# Patient Record
Sex: MALE | Race: WHITE | NOT HISPANIC OR LATINO | Employment: UNEMPLOYED | URBAN - METROPOLITAN AREA
[De-identification: names, ages, dates, MRNs, and addresses within clinical notes are randomized per-mention and may not be internally consistent; named-entity substitution may affect disease eponyms.]

---

## 2017-06-20 ENCOUNTER — OFFICE VISIT (OUTPATIENT)
Dept: SLEEP MEDICINE | Facility: CLINIC | Age: 22
End: 2017-06-20
Attending: INTERNAL MEDICINE
Payer: COMMERCIAL

## 2017-06-20 VITALS
OXYGEN SATURATION: 99 % | HEIGHT: 63 IN | HEART RATE: 99 BPM | BODY MASS INDEX: 22.02 KG/M2 | WEIGHT: 124.3 LBS | SYSTOLIC BLOOD PRESSURE: 143 MMHG | RESPIRATION RATE: 20 BRPM | DIASTOLIC BLOOD PRESSURE: 92 MMHG

## 2017-06-20 DIAGNOSIS — G47.19 EXCESSIVE DAYTIME SLEEPINESS: Primary | ICD-10-CM

## 2017-06-20 LAB
AMPHETAMINES UR QL SCN: NORMAL
BARBITURATES UR QL: NORMAL
BENZODIAZ UR QL: NORMAL
CANNABINOIDS UR QL SCN: NORMAL
COCAINE UR QL: NORMAL
ETHANOL UR QL SCN: NORMAL
OPIATES UR QL SCN: NORMAL
PCP UR QL SCN: NORMAL

## 2017-06-20 PROCEDURE — 80320 DRUG SCREEN QUANTALCOHOLS: CPT | Performed by: INTERNAL MEDICINE

## 2017-06-20 PROCEDURE — 80307 DRUG TEST PRSMV CHEM ANLYZR: CPT | Performed by: INTERNAL MEDICINE

## 2017-06-20 PROCEDURE — 99211 OFF/OP EST MAY X REQ PHY/QHP: CPT | Mod: ZF

## 2017-06-20 RX ORDER — CETIRIZINE HYDROCHLORIDE 10 MG/1
10 TABLET ORAL DAILY
COMMUNITY

## 2017-06-20 RX ORDER — VENLAFAXINE HYDROCHLORIDE 150 MG/1
300 TABLET, EXTENDED RELEASE ORAL EVERY MORNING
COMMUNITY

## 2017-06-20 NOTE — NURSING NOTE
"Chief Complaint   Patient presents with     Sleep Problem       Initial BP (!) 143/92  Pulse 99  Resp 20  Ht 1.6 m (5' 3\")  Wt 56.4 kg (124 lb 4.8 oz)  SpO2 99%  BMI 22.02 kg/m2 Estimated body mass index is 22.02 kg/(m^2) as calculated from the following:    Height as of this encounter: 1.6 m (5' 3\").    Weight as of this encounter: 56.4 kg (124 lb 4.8 oz).  Medication Reconciliation: complete   Neck 34      [facility] CMA      "

## 2017-06-20 NOTE — MR AVS SNAPSHOT
After Visit Summary   6/20/2017    Namita Maharaj    MRN: [id number]           Patient Information     Date Of Birth          1995        Visit Information        Provider Department      6/20/2017 2:00 PM Alyssa Bryant MD [facility], [place], Sleep Study        Today's Diagnoses     Excessive daytime sleepiness    -  1      Care Instructions      Your BMI is Body mass index is 22.02 kg/(m^2).  Weight management is a personal decision.  If you are interested in exploring weight loss strategies, the following discussion covers the approaches that may be successful. Body mass index (BMI) is one way to tell whether you are at a healthy weight, overweight, or obese. It measures your weight in relation to your height.  A BMI of 18.5 to 24.9 is in the healthy range. A person with a BMI of 25 to 29.9 is considered overweight, and someone with a BMI of 30 or greater is considered obese. More than two-thirds of American adults are considered overweight or obese.  Being overweight or obese increases the risk for further weight gain. Excess weight may lead to heart disease and diabetes.  Creating and following plans for healthy eating and physical activity may help you improve your health.  Weight control is part of healthy lifestyle and includes exercise, emotional health, and healthy eating habits. Careful eating habits lifelong are the mainstay of weight control. Though there are significant health benefits from weight loss, long-term weight loss with diet alone may be very difficult to achieve- studies show long-term success with dietary management in less than 10% of people. Attaining a healthy weight may be especially difficult to achieve in those with severe obesity. In some cases, medications, devices and surgical management might be considered.  What can you do?  If you are overweight or obese and are interested in methods for weight loss, you should discuss this with your provider.      Consider reducing daily calorie intake by 500 calories.     Keep a food journal.     Avoiding skipping meals, consider cutting portions instead.    Diet combined with exercise helps maintain muscle while optimizing fat loss. Strength training is particularly important for building and maintaining muscle mass. Exercise helps reduce stress, increase energy, and improves fitness. Increasing exercise without diet control, however, may not burn enough calories to loose weight.       Start walking three days a week 10-20 minutes at a time    Work towards walking thirty minutes five days a week     Eventually, increase the speed of your walking for 1-2 minutes at time    In addition, we recommend that you review healthy lifestyles and methods for weight loss available through the National Institutes of Health patient information sites:  http://win.niddk.nih.gov/publications/index.htm    And look into health and wellness programs that may be available through your health insurance provider, employer, local community center, or jordan club.                  Follow-ups after your visit        Who to contact     If you have questions or need follow up information about today's clinic visit or your schedule please contact [facility] directly at [phone].  Normal or non-critical lab and imaging results will be communicated to you by [employer], letter or phone within 4 business days after the clinic has received the results. If you do not hear from us within 7 days, please contact the clinic through [employer] or phone. If you have a critical or abnormal lab result, we will notify you by phone as soon as possible.  Submit refill requests through People [employer] or call your pharmacy and they will forward the refill request to us. Please allow 3 business days for your refill to be completed.          Additional Information About Your Visit        [employer] Information     People and Pages lets you send messages to your doctor,  "view your test results, renew your prescriptions, schedule appointments and more. To sign up, go to [email or web address] . Click on \"Log in\" on the left side of the screen, which will take you to the Welcome page. Then click on \"Sign up Now\" on the right side of the page.     You will be asked to enter the access code listed below, as well as some personal information. Please follow the directions to create your username and password.     Your access code is: [email or web address]  Expires: 2017  5:43 PM     Your access code will  in 90 days. If you need help or a new code, please call your [facility] clinic or [phone].        Care EveryWhere ID     This is your Care EveryWhere ID. This could be used by other organizations to access your [facility] medical records  [id number]        Your Vitals Were     Pulse Respirations Height Pulse Oximetry BMI (Body Mass Index)       99 20 1.6 m (5' 3\") 99% 22.02 kg/m2        Blood Pressure from Last 3 Encounters:   17 (!) 143/92    Weight from Last 3 Encounters:   17 56.4 kg (124 lb 4.8 oz) (42 %)*     * Growth percentiles are based on CDC 2-20 Years data.              We Performed the Following     Drug abuse screen 8 urine (UR)        Primary Care Provider    None       No address on file        Thank you!     Thank you for choosing [facility]  for your care. Our goal is always to provide you with excellent care. Hearing back from our patients is one way we can continue to improve our services. Please take a few minutes to complete the written survey that you may receive in the mail after your visit with us. Thank you!             Your Updated Medication List - Protect others around you: Learn how to safely use, store and throw away your medicines at www.disposemymeds.org.          This list is accurate as of: 17  5:43 PM.  Always use your most recent med list.                   Brand Name Dispense Instructions for use    cetirizine " 10 MG tablet    zyrTEC     Take 10 mg by mouth       estradiol cypionate 5 MG/ML injection    DEPO-ESTRADIOL     Inject into the muscle every 28 days       * RITALIN PO      Take 10 mg by mouth 5 times daily       * CONCERTA PO      Take 36 mg by mouth daily       venlafaxine 150 MG Tb24 24 hr tablet    EFFEXOR-ER     Take 150 mg by mouth daily (with breakfast)       * Notice:  This list has 2 medication(s) that are the same as other medications prescribed for you. Read the directions carefully, and ask your doctor or other care provider to review them with you.

## 2017-06-20 NOTE — PATIENT INSTRUCTIONS

## 2017-06-20 NOTE — PROGRESS NOTES
DICTATED THE SLEEP CLINIC VISIT NOTE FOR TODAY.  Alyssa Bryant MD   of Medicine,  Division of Pulmonary/Sleep Medicine  [facility].
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
## 2017-07-12 ENCOUNTER — TEAM CONFERENCE (OUTPATIENT)
Dept: SLEEP MEDICINE | Facility: CLINIC | Age: 22
End: 2017-07-12

## 2017-07-12 NOTE — TELEPHONE ENCOUNTER
Records received from [facility] [place] regarding sleep study and MSLT. Chart notes were also sent.     All records were scanned and handed to provider for review.     WOJCIECH Dennis  Clinic Coordinator   Registered Medical Assistant   [facility]

## 2021-06-30 ENCOUNTER — TELEPHONE (OUTPATIENT)
Dept: SURGERY | Facility: CLINIC | Age: 26
End: 2021-06-30

## 2021-06-30 ENCOUNTER — TELEPHONE (OUTPATIENT)
Dept: PLASTIC SURGERY | Facility: CLINIC | Age: 26
End: 2021-06-30

## 2021-06-30 DIAGNOSIS — F64.0 GENDER DYSPHORIA IN ADOLESCENT AND ADULT: Primary | ICD-10-CM

## 2021-06-30 NOTE — TELEPHONE ENCOUNTER
Writer called pt regarding interest in top surgery. No answer, LVM with [facility] contact information.     Lucina France

## 2021-06-30 NOTE — TELEPHONE ENCOUNTER
[facility] Call Center    Phone Message    May a detailed message be left on voicemail: yes     Reason for Call: Other: Pt called in wanting to speak with care team about top surgery. Please reach back out. Thank you.     Action Taken: Message routed to:  [facility]ose antonio [facility]    Travel Screening: Not Applicable

## 2021-06-30 NOTE — TELEPHONE ENCOUNTER
[facility] :  Care Coordination Note     SITUATION   Pt (Gregg, he/him) is a 25 year old adult  who is receiving support for:  Care Team  .    BACKGROUND     Pt called back regarding interest in top surgery. Scheduled for 3/22/21 with Juanita.     Pt's name is legally changed, will provide court order documentation via [employer].     ASSESSMENT     Surgery              [facility] Assessment  [facility] ([facility]) Enrollment: Enrolled  Patient has a therapist: Yes  Name of therapist: Arlet Cabrera at [facility]  Letter of support #1: Requested  Surgery being considered: Yes  Mastectomy: Yes    Pt reports:    No smoking  No diabetes, but does report hypoglycemia  HRT since August 2019  No previous gender confirming surgeries  Sees Arlet Cabrera at [facility] for therapy      PLAN          Nursing Interventions:  [facility] assessment completed    Follow-up plan:    1. Obtain LOS, send name change documentation       Lucina France

## 2021-08-14 ENCOUNTER — HEALTH MAINTENANCE LETTER (OUTPATIENT)
Age: 26
End: 2021-08-14

## 2021-10-04 ENCOUNTER — HEALTH MAINTENANCE LETTER (OUTPATIENT)
Age: 26
End: 2021-10-04

## 2021-12-23 NOTE — TELEPHONE ENCOUNTER
FUTURE VISIT INFORMATION      FUTURE VISIT INFORMATION:    Date: 3/22/22    Time: 1:00pm    Location: [facility]  REFERRAL INFORMATION:    Referring provider:  self    Referring providers clinic:  n/a    Reason for visit/diagnosis  top consult     RECORDS REQUESTED FROM:       [facility] to collect

## 2022-01-07 ENCOUNTER — MEDICAL CORRESPONDENCE (OUTPATIENT)
Dept: HEALTH INFORMATION MANAGEMENT | Facility: CLINIC | Age: 27
End: 2022-01-07
Payer: COMMERCIAL

## 2022-01-20 ENCOUNTER — MEDICAL CORRESPONDENCE (OUTPATIENT)
Dept: HEALTH INFORMATION MANAGEMENT | Facility: CLINIC | Age: 27
End: 2022-01-20
Payer: COMMERCIAL

## 2022-01-21 ENCOUNTER — TRANSCRIBE ORDERS (OUTPATIENT)
Dept: OTHER | Age: 27
End: 2022-01-21
Payer: COMMERCIAL

## 2022-01-21 DIAGNOSIS — N30.90 CYSTITIS: ICD-10-CM

## 2022-01-21 DIAGNOSIS — Z78.9 FEMALE-TO-MALE TRANSGENDER PERSON: Primary | ICD-10-CM

## 2022-01-25 ENCOUNTER — PRE VISIT (OUTPATIENT)
Dept: UROLOGY | Facility: CLINIC | Age: 27
End: 2022-01-25
Payer: COMMERCIAL

## 2022-01-25 NOTE — TELEPHONE ENCOUNTER
MEDICAL RECORDS REQUEST   [facility]  Urology Clinic  [address] [place] [address]  PHONE: [phone]  Fax: [phone]        FUTURE VISIT INFORMATION                                                   Gregg Maharaj, : 1995 scheduled for future visit at [facility]    APPOINTMENT INFORMATION:    Date: 2022    Provider:  Marnie Stout PA    Reason for Visit/Diagnosis: Female-to-male transgender person [Z78.9]    Cystitis [N30.90]      REFERRAL INFORMATION:    Referring provider: Jessica Cardoso MD     Specialty: N/A    Referring providers clinic:  [facility]     Clinic contact number:  Phone: [phone], Fax: [phone]    RECORDS REQUESTED FOR VISIT                                                     NOTES  STATUS/DETAILS   OFFICE NOTE from referring provider  yes, 2022   OFFICE NOTE from other specialist  no   DISCHARGE SUMMARY from hospital  no   DISCHARGE REPORT from the ER  no   OPERATIVE REPORT  no   MEDICATION LIST  yes   LABS     URINALYSIS (UA)  yes   URINE CYTOLOGY  no   IMAGING (IMAGES & REPORT)  no     PRE-VISIT CHECKLIST      Record collection complete Yes   Appointment appropriately scheduled           (right time/right provider) Yes   Joint diagnostic appointment coordinated correctly          (ensure right order & amount of time) Yes   MyChart activation Yes   Questionnaire complete If no, please explain pending

## 2022-01-26 ENCOUNTER — TRANSCRIBE ORDERS (OUTPATIENT)
Dept: MULTI SPECIALTY CLINIC | Facility: CLINIC | Age: 27
End: 2022-01-26
Payer: COMMERCIAL

## 2022-01-26 DIAGNOSIS — R74.8 ELEVATED CK: ICD-10-CM

## 2022-01-26 DIAGNOSIS — R76.8 POSITIVE ANA (ANTINUCLEAR ANTIBODY): Primary | ICD-10-CM

## 2022-01-26 DIAGNOSIS — G89.4 CHRONIC PAIN SYNDROME: ICD-10-CM

## 2022-01-26 NOTE — CONFIDENTIAL NOTE
Reason for visit: consult cystitis     Relevant information: dysuria, bilateral lower quadrant abdominal and lower back pain, urinary frequency and urgency    Records/imaging/labs/orders: records and labs I [facility]    Pt called: n/a    At Rooming: virtual

## 2022-01-27 ENCOUNTER — TELEPHONE (OUTPATIENT)
Dept: MULTI SPECIALTY CLINIC | Facility: CLINIC | Age: 27
End: 2022-01-27

## 2022-01-27 NOTE — TELEPHONE ENCOUNTER
[facility] Call Center    Phone Message    May a detailed message be left on voicemail: yes     Reason for Call: Appointment Intake    Referring Provider Name: Jessica Cardoso MD    Diagnosis and/or Symptoms: Positive VERNON (antinuclear antibody)  Elevated CK    [place] [facility]          Action Taken: Message routed to:  [facility] ([facility] Rheum    Travel Screening: Not Applicable

## 2022-01-28 ENCOUNTER — VIRTUAL VISIT (OUTPATIENT)
Dept: UROLOGY | Facility: CLINIC | Age: 27
End: 2022-01-28
Attending: INTERNAL MEDICINE
Payer: COMMERCIAL

## 2022-01-28 DIAGNOSIS — R35.0 URINARY FREQUENCY: ICD-10-CM

## 2022-01-28 DIAGNOSIS — Z78.9 FEMALE-TO-MALE TRANSGENDER PERSON: ICD-10-CM

## 2022-01-28 DIAGNOSIS — R10.2 PELVIC PAIN IN FEMALE: Primary | ICD-10-CM

## 2022-01-28 DIAGNOSIS — R39.15 URINARY URGENCY: ICD-10-CM

## 2022-01-28 DIAGNOSIS — N30.90 CYSTITIS: ICD-10-CM

## 2022-01-28 PROBLEM — M79.605 BILATERAL LEG PAIN: Status: ACTIVE | Noted: 2021-09-16

## 2022-01-28 PROBLEM — J45.20 MILD INTERMITTENT ASTHMA WITHOUT COMPLICATION: Status: ACTIVE | Noted: 2018-01-09

## 2022-01-28 PROBLEM — M79.604 BILATERAL LEG PAIN: Status: ACTIVE | Noted: 2021-09-16

## 2022-01-28 PROBLEM — R76.8 ANA POSITIVE: Status: ACTIVE | Noted: 2021-07-09

## 2022-01-28 PROBLEM — R74.8 ELEVATED CK: Status: ACTIVE | Noted: 2021-07-09

## 2022-01-28 PROBLEM — N39.0 ACUTE UTI: Status: ACTIVE | Noted: 2022-01-18

## 2022-01-28 PROBLEM — N94.6 DYSMENORRHEA: Status: ACTIVE | Noted: 2018-01-09

## 2022-01-28 PROBLEM — G47.411 NARCOLEPSY AND CATAPLEXY: Status: ACTIVE | Noted: 2022-01-28

## 2022-01-28 PROBLEM — F41.0 PANIC: Status: ACTIVE | Noted: 2021-12-11

## 2022-01-28 PROCEDURE — 99203 OFFICE O/P NEW LOW 30 MIN: CPT | Mod: GT | Performed by: PHYSICIAN ASSISTANT

## 2022-01-28 RX ORDER — OMEGA-3 FATTY ACIDS/FISH OIL 300-1000MG
CAPSULE ORAL PRN
COMMUNITY

## 2022-01-28 RX ORDER — CEFDINIR 300 MG/1
300 CAPSULE ORAL
COMMUNITY
Start: 2022-01-18 | End: 2022-01-28

## 2022-01-28 RX ORDER — ALBUTEROL SULFATE 90 UG/1
2 AEROSOL, METERED RESPIRATORY (INHALATION) PRN
COMMUNITY
Start: 2021-10-04

## 2022-01-28 RX ORDER — GABAPENTIN 300 MG/1
300-600 CAPSULE ORAL 3 TIMES DAILY PRN
COMMUNITY
Start: 2021-10-16

## 2022-01-28 RX ORDER — SUMATRIPTAN 50 MG/1
50 TABLET, FILM COATED ORAL
COMMUNITY
Start: 2021-07-06 | End: 2024-08-14 | Stop reason: ALTCHOICE

## 2022-01-28 RX ORDER — TESTOSTERONE 20.25 MG/1.25G
GEL TOPICAL
COMMUNITY
Start: 2021-07-06 | End: 2022-09-12

## 2022-01-28 RX ORDER — MULTIVIT-MIN/FOLIC ACID/BIOTIN 400-400MCG
1 CAPSULE ORAL DAILY
COMMUNITY
Start: 2021-07-07 | End: 2024-08-14

## 2022-01-28 RX ORDER — CLINDAMYCIN AND BENZOYL PEROXIDE 10; 50 MG/G; MG/G
GEL TOPICAL PRN
COMMUNITY
Start: 2021-04-27 | End: 2024-08-14 | Stop reason: ALTCHOICE

## 2022-01-28 NOTE — PROGRESS NOTES
Gregg is a 26 year old who is being evaluated via a billable video visit.      How would you like to obtain your AVS? [employer]  If the video visit is dropped, the invitation should be resent by: Text to cell phone: [phone]  Will anyone else be joining your video visit? No      Video Start Time: 1:34 PM  Video-Visit Details    Type of service:  Video Visit    Video End Time:1:52 PM    Originating Location (pt. Location): Home    Distant Location (provider location):  [facility] UROLOGY [facility]     Platform used for Video Visit: Ataxion

## 2022-01-28 NOTE — TELEPHONE ENCOUNTER
Reviewed patient's chart: CK and VERNON results from 7/9/21: VERNON 1:320, dense fine speckled pattern, CK  223. Has been seen at [facility] for exercise intolerance, is currently receiving physical therapy for chronic fatigue and chronic pain. [facility] rheumatologists do not see patient with these diagnosis, patient should not be scheduled at [facility]   Amina Gray RN  Adult Rheumatology Clinic

## 2022-01-28 NOTE — PROGRESS NOTES
Chief Complaint:   Persistent UTI symptoms         History of Present Illness:   Gregg Maharaj is a 26 year old transgender male who presents for evaluation of persistent UTI symptoms.  He reports symptoms started with lower pelvic pain, and cramping with urination.  He also reports urinary frequency and urgency, but denies any incontinence.  No gross hematuria.  He was diagnosed with an urinary tract infection on 1/11/2022 at an outside facility and reports being prescribed ciprofloxacin 500 mg bid for 7 days in order to treat such.  He reports that an urine culture obtained at that visit grew out only mixed mariely.  He reports taking the ciprofloxacin as prescribed but denies finding it helpful thus far.  On 1/16/2022 he developed some flank pain and low grade fever, but this subsequently improved.  He subsequently presented to  on 1/18/2022 at which time he was switched to cefdinir for resumed UTI, but urine culture was ultimately negative for growth again.  The patient reports continued symptoms of mild low back pain, usually with urination, and continued lower pelvic pain and cramping with urination.  He reports his urinary stream is generally strong and normal, though he does report occasional foamy urine.  He also reports some delay in starting his urinary stream and has to relax his pelvic muscles, though denies having to strain to void.  He reports he fully empties his bladder.  Patient reports he had STI screening (GC/CT and HIV) with negative results, and normal pelvic exam on 1/21/2022 at the [facility].      Patient reports a history of UTI and pyelonephritis in 4/2020.      Patient has not had gender reassignment surgery.          Past Medical History:   No past medical history on file.         Past Surgical History:   No past surgical history on file.         Medications     Current Outpatient Medications   Medication     albuterol (PROAIR HFA/PROVENTIL HFA/VENTOLIN HFA) 108 (90 Base)  MCG/ACT inhaler     cefdinir (OMNICEF) 300 MG capsule     cetirizine (ZYRTEC) 10 MG tablet     clindamycin-benzoyl peroxide (BENZACLIN) 1-5 % external gel     estradiol cypionate (DEPO-ESTRADIOL) 5 MG/ML injection     gabapentin (NEURONTIN) 300 MG capsule     ibuprofen (ADVIL/MOTRIN) 200 MG capsule     Methylphenidate HCl (CONCERTA PO)     Methylphenidate HCl (RITALIN PO)     OTHER MEDICAL SUPPLIES     propranolol SR BEADS (INDREAL XL) 120 MG 24 hr capsule     Specialty Vitamins Products (VITAMINS FOR HAIR) CAPS     SUMAtriptan (IMITREX) 50 MG tablet     Testosterone 20.25 MG/1.25GM (1.62%) GEL     venlafaxine (EFFEXOR-ER) 150 MG TB24 24 hr tablet     No current facility-administered medications for this visit.            Family History:   No family history on file.         Social History:     Social History     Socioeconomic History     Marital status: Single     Spouse name: Not on file     Number of children: Not on file     Years of education: Not on file     Highest education level: Not on file   Occupational History     Not on file   Tobacco Use     Smoking status: Never Smoker     Smokeless tobacco: Never Used   Substance and Sexual Activity     Alcohol use: Not on file     Drug use: Not on file     Sexual activity: Not on file   Other Topics Concern     Not on file   Social History Narrative     Not on file     Social Determinants of Health     Financial Resource Strain: Not on file   Food Insecurity: Not on file   Transportation Needs: Not on file   Physical Activity: Not on file   Stress: Not on file   Social Connections: Not on file   Intimate Partner Violence: Not on file   Housing Stability: Not on file            Allergies:   Adhesive tape, Other environmental allergy, and Metoclopramide         Review of Systems:  From intake questionnaire   Negative 14 system review except as noted on HPI, nurse's note.         Physical Exam:   Patient is a 26 year old  adult    General Appearance Adult: Alert, no  acute distress, oriented  Lungs: no respiratory distress, or pursed lip breathing  Heart: No obvious jugular venous distension present  Skin: no suspicious lesions or rashes  Neuro: Alert, oriented, speech and mentation normal  Further examination is deferred due to the nature of our visit.        Labs and Pathology:    I personally reviewed all applicable laboratory data and went over findings with patient  Significant for:    CBC RESULTS:  No results for input(s): WBC, HGB, PLT in the last 18628 hours.     BMP RESULTS:  No results for input(s): NA, POTASSIUM, CHLORIDE, CO2, ANIONGAP, GLC, BUN, CR, GFRESTIMATED, GFRESTBLACK, JASMINE in the last 86249 hours.    UA RESULTS: (1/25/2022)  Contains abnormal data UA W/ SEDIMENT EXAM REFLEXED PER CRITERIA  Specimen:  Urine - Urine specimen (specimen)   Ref Range & Units 3 d ago   COLOR                     Yellow Color Yellow    CLARITY                   Clear Clarity Clear    SPECIFIC GRAVITY,URINE    1.010, 1.015, 1.020, 1.025                 1.020    PH,URINE                  6.0, 7.0, 8.0, 5.5, 6.5, 7.5, 8.5                 6.5    UROBILINOGEN,QUALITATIVE  Normal EU/dl Normal    PROTEIN, URINE Negative mg/dL Negative    GLUCOSE, URINE Negative mg/dL Negative    KETONES,URINE             Negative mg/dL Negative    BILIRUBIN,URINE           Negative                 Negative    OCCULT BLOOD,URINE        Negative                 Negative    NITRITE                   Negative                 Negative    LEUKOCYTE ESTERASE        Negative                 Small Abnormal       URINALYSIS MICROSCOPIC  Specimen:  Urine - Urine specimen (specimen)   Ref Range & Units 3 d ago   RBC 0-2, None Seen /HPF 0-2    WBC 0-2, 3-5, None Seen /HPF 3-5    BACTERIA                  None Seen, Rare, Few Bacteria/HPF Few    EPITHELIAL CELLS          None Seen, Few Epi/HPF Few      URINE CULTURE  Specimen:  Urine - Urine specimen (specimen)  Component 3 d ago   CULTURE  <10,000 CFU/mL multiple  organisms        PSA RESULTS  No results found for: PSA      Imaging:    I personally reviewed all applicable imaging and went over findings with patient.  Significant for:    No results found for this or any previous visit.           Assessment and Plan:     Assessment: 26 year old adult with persistent symptoms of pelvic pain, cramping with urination, urinary frequency and urgency, and intermittent mild lower back pain.  Patient has completed two courses of antibiotic therapy with both ciprofloxacin and cefdinir with minimal improvement in symptoms, and multiple UA's unremarkable with negative urine cultures.  We will plan for ureaplasma/mycoplasma culture to rule out atypical urethritis.  We will also plan for referral to PFPT given long standing symptoms of difficulty relaxing his pelvic muscles and suspect pelvic floor dysfunction may be contributing.  If symptoms persist despite negative infectious work up, could consider CT urogram to evaluate for a lower ureter stone, though my suspicion is low given improvement in flank pain and no signs of hematuria on UA's.      Plan:  - Schedule lab only appointment for ureaplasma/mycoplasma urine culture.   - Referral placed for pelvic floor physical therapy.        DONIS Uribe  Department of Urology

## 2022-01-28 NOTE — PATIENT INSTRUCTIONS
UROLOGY CLINIC VISIT PATIENT INSTRUCTIONS    - Schedule lab only appointment for ureaplasma/mycoplasma urine culture.   - Referral placed for pelvic floor physical therapy.  See below for scheduling number and locations.     If you have any issues, questions or concerns in the meantime, do not hesitate to contact us at [phone] or via Press.     It was a pleasure meeting with you today.  Thank you for allowing me and my team the privilege of caring for you today.  YOU are the reason we are here, and I truly hope we provided you with the excellent service you deserve.  Please let us know if there is anything else we can do for you so that we can be sure you are leaving completely satisfied with your care experience.    Marnie Stout PA-C  Department of Urology    ---------------------------------------------------------------------------  Physical Therapy Referral    Please call [phone] to make an appointment     [facility] - [email or web address]  [facility] - [email or web address]    Locations:    [facility] U-Ortho PT [place]   [place] [facility] [place]   FS[facility] [place] [facility] [place]  [place] [facility]

## 2022-02-02 ENCOUNTER — LAB (OUTPATIENT)
Dept: LAB | Facility: CLINIC | Age: 27
End: 2022-02-02
Payer: COMMERCIAL

## 2022-02-02 DIAGNOSIS — R39.15 URINARY URGENCY: ICD-10-CM

## 2022-02-02 DIAGNOSIS — R35.0 URINARY FREQUENCY: ICD-10-CM

## 2022-02-02 PROCEDURE — 87109 MYCOPLASMA: CPT

## 2022-02-09 LAB — BACTERIA UR CULT: NORMAL

## 2022-02-11 ENCOUNTER — MEDICAL CORRESPONDENCE (OUTPATIENT)
Dept: HEALTH INFORMATION MANAGEMENT | Facility: CLINIC | Age: 27
End: 2022-02-11
Payer: COMMERCIAL

## 2022-02-14 ENCOUNTER — ANCILLARY PROCEDURE (OUTPATIENT)
Dept: CT IMAGING | Facility: CLINIC | Age: 27
End: 2022-02-14
Attending: PHYSICIAN ASSISTANT
Payer: COMMERCIAL

## 2022-02-14 DIAGNOSIS — R35.0 URINARY FREQUENCY: ICD-10-CM

## 2022-02-14 DIAGNOSIS — R10.2 PELVIC PAIN: ICD-10-CM

## 2022-02-14 DIAGNOSIS — R39.15 URINARY URGENCY: ICD-10-CM

## 2022-02-14 LAB
CREAT BLD-MCNC: 0.8 MG/DL (ref 0.5–1.3)
GFR SERPL CREATININE-BSD FRML MDRD: >60 ML/MIN/1.73M2

## 2022-02-14 PROCEDURE — 74178 CT ABD&PLV WO CNTR FLWD CNTR: CPT | Performed by: RADIOLOGY

## 2022-02-14 RX ORDER — IOPAMIDOL 755 MG/ML
76 INJECTION, SOLUTION INTRAVASCULAR ONCE
Status: DISCONTINUED | OUTPATIENT
Start: 2022-02-14 | End: 2022-02-14 | Stop reason: CLARIF

## 2022-02-14 RX ORDER — IOPAMIDOL 755 MG/ML
120 INJECTION, SOLUTION INTRAVASCULAR ONCE
Status: COMPLETED | OUTPATIENT
Start: 2022-02-14 | End: 2022-02-14

## 2022-02-14 RX ADMIN — IOPAMIDOL 120 ML: 755 INJECTION, SOLUTION INTRAVASCULAR at 15:54

## 2022-02-17 ENCOUNTER — TRANSCRIBE ORDERS (OUTPATIENT)
Dept: OTHER | Age: 27
End: 2022-02-17
Payer: COMMERCIAL

## 2022-02-17 DIAGNOSIS — L65.9 ALOPECIA: Primary | ICD-10-CM

## 2022-03-21 ENCOUNTER — TELEPHONE (OUTPATIENT)
Dept: PLASTIC SURGERY | Facility: CLINIC | Age: 27
End: 2022-03-21
Payer: COMMERCIAL

## 2022-03-21 NOTE — TELEPHONE ENCOUNTER
Spoke with patient regarding appointment tomorrow with  at 1:00.    Patient asked if they should bring a LOS.    I stated that would be great to bring it.

## 2022-03-22 ENCOUNTER — PRE VISIT (OUTPATIENT)
Dept: UROLOGY | Facility: CLINIC | Age: 27
End: 2022-03-22

## 2022-03-22 ENCOUNTER — OFFICE VISIT (OUTPATIENT)
Dept: PLASTIC SURGERY | Facility: CLINIC | Age: 27
End: 2022-03-22
Payer: COMMERCIAL

## 2022-03-22 ENCOUNTER — MEDICAL CORRESPONDENCE (OUTPATIENT)
Dept: HEALTH INFORMATION MANAGEMENT | Facility: CLINIC | Age: 27
End: 2022-03-22

## 2022-03-22 ENCOUNTER — TRANSFERRED RECORDS (OUTPATIENT)
Dept: HEALTH INFORMATION MANAGEMENT | Facility: CLINIC | Age: 27
End: 2022-03-22

## 2022-03-22 ENCOUNTER — PRE VISIT (OUTPATIENT)
Dept: SURGERY | Facility: CLINIC | Age: 27
End: 2022-03-22
Payer: COMMERCIAL

## 2022-03-22 VITALS
HEIGHT: 63 IN | HEART RATE: 68 BPM | OXYGEN SATURATION: 95 % | TEMPERATURE: 99.4 F | WEIGHT: 218 LBS | SYSTOLIC BLOOD PRESSURE: 121 MMHG | DIASTOLIC BLOOD PRESSURE: 75 MMHG | BODY MASS INDEX: 38.62 KG/M2

## 2022-03-22 DIAGNOSIS — F64.0 GENDER DYSPHORIA IN ADOLESCENT AND ADULT: Primary | ICD-10-CM

## 2022-03-22 DIAGNOSIS — G47.10 HYPERSOMNIA: ICD-10-CM

## 2022-03-22 DIAGNOSIS — F43.10 PTSD (POST-TRAUMATIC STRESS DISORDER): ICD-10-CM

## 2022-03-22 DIAGNOSIS — F33.9 EPISODE OF RECURRENT MAJOR DEPRESSIVE DISORDER, UNSPECIFIED DEPRESSION EPISODE SEVERITY (H): ICD-10-CM

## 2022-03-22 PROCEDURE — 99204 OFFICE O/P NEW MOD 45 MIN: CPT | Mod: KX | Performed by: SURGERY

## 2022-03-22 ASSESSMENT — PAIN SCALES - GENERAL: PAINLEVEL: MODERATE PAIN (4)

## 2022-03-22 NOTE — PROGRESS NOTES
"PLASTICS NEW TOP   HPI: This is a 26 year old biological female transitioning with a history of gender dysphoria and an autoimmune disorder who presents today with his partner, Yuan for a consultation for top surgery. His pronouns are he/him or they/them and his preferred name is Gregg. He was referred by a previous patient and made his appointment 9 months ago. His therapist is Arlet Cabrera at [facility] and has an LOS. He has been on testosterone for 2.5 years, administered by [facility]. He has been living his chosen gender identity/role since 2017. He used to bind but stopped due to pressure pain. Patient reports having occasional pain and mild lactation from R breast. No breast lumps, skin puckering, bloody nipple drainage, or other breast problems. No history of breast imaging, including mammogram or breast ultrasound.     Medical Hx: Gender dysphoria. No history of diabetes mellitus, or GERD. No bleeding, clotting, healing or scarring problems.  Positive VERNON test.  Possible Lupus? Fibromyalgia? Memory issues. Severe brain fog. Possibly due to autoimmune disorder.  Possible EDS?  Hypersomnia.  Sleep-maintence insomnia   Unexplained paralysis. Originally diagnosed as cataplexy. Emotional and environmental triggers. 4th generation? Treated with stimulants and Effexor x 8 years. Helped by NE uptake inhibitor.   Endometriosis.   Asthma.  Hypoglycemia.   Chronic migraines.   Anxiety.  Depression. Bipolar.  ADHD.   PTSD. Victim of stalking.   Features of psychosis - \"\". Depression related.     Surgical Hx:   Oral surgery. Did OK with sedation.  Reglan: feels claustrophobic   Lidocaine: fast metabolism    Family Hx: No family history of breast or ovarian cancer.  Mother: RA and uterine polyp that was precancerous.  Narcolepsy.  Grandmother and Great-Aunt: EDS.  MGF: Hypertension, MI, and DM.  Maternal family history of endometriosis.     Social Hx: Occupation: Worked until November 2021, now on " "[employer].  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   Relationship status/family: Lives with his partner, Yuan, and their roommate. Delta will help with post op cares. Smoking status: None. Alcohol use: None. Diet: Omnivore. High protein and high carb diet due to fast metabolism. Caffeine: 2-3 cans of Coke QD and occasionally coffee. Exercise: PT once a week. Working on pelvic floor and aquatic PT for hypermobility. Occasionally walks. Sleep: 10 hours at bedtime with medication. Methylfenidate. KACY. Baclofen. Propranolol.    PE: General: Height: 5' 3\" Weight: 218 lbs 0 oz   Chest:   Nice upper chest contour.   Chest hair and mild acne.  Bilateral chest striae.  Slight depression of lower sternum.     Grade 2.5 nipple ptosis.   R breast (600 g)  is slightly larger than the L (400 g).   IMFs situated about 2-3 cms below the pec muscle. Incisions may touch at midline.   R IMF situated about 2 lower than the L.   Moderate lateral thoracic rolls.   No anterior axillary folds.    No lymphadenopathy or masses.   Photos taken with consent.     A&P: 26 year old biological female transitioning to male who is a good candidate for gender affirming top surgery with one of the following: bilateral simple mastectomy vs. breast reduction, +/- possible nipple graft reconstruction. He will most likely need a bilateral simple mastectomy with nipple graft reconstruction depending on intraoperative findings and the patient's desired outcome. The patient is interested in nipple grafts. The patient will need a pre-op mammogram in addition to an H&P from their PCP. They will also need a diagnostic assessment with their LOS. They may also benefit from a PAC visit given their extensive PMH and \"cataplexy\".    He did not meet with our Transgender Coordinator but they will be in contact via [employer] to discuss communications with staff and timeline. He did receive an introductory folder of information and contact numbers/names. Any further " discussion of risks and complications will be reviewed during the pre-op visit.    Patient accepts the risks of this procedure and would like to proceed with surgery. He is able to give informed consent for this medically necessary procedure.   Once we receive his therapist letter of support, DA and mammogram results we will initiate the prior authorization process.     According to [place] Case Law and [facility] standards of care, with an appropriate letter of support from a mental health provider, top surgery/mastectomy is medically necessary for the treatment of gender dysphoria.     Total time = 45 minutes, spent on the date of encounter doing chart review, history and physical, dressing changes, documentation, patient education, and any further activity as noted above.     This note was prepared on behalf of Anita Grant MD by Sanjana Gross, a trained medical scribe, based on my observations and the provider's statements to me.

## 2022-03-22 NOTE — NURSING NOTE
"Chief Complaint   Patient presents with     RANCHO Escobedo, is being seen today for a consult regarding top surgery.       Vitals:    03/22/22 1245   BP: 121/75   BP Location: Left arm   Patient Position: Chair   Cuff Size: Adult Large   Pulse: 68   Temp: 99.4  F (37.4  C)   TempSrc: Oral   SpO2: 95%   Weight: 98.9 kg (218 lb)   Height: 1.6 m (5' 3\")       Body mass index is 38.62 kg/m .      Cassandra Zamora LPN    "

## 2022-03-22 NOTE — CONFIDENTIAL NOTE
Reason for visit: cystoscopy     Relevant information: pelvic pain in female, transgender male    Records/imaging/labs/orders: in epic    Pt called: n/a    At Rooming: collect urine, give cipro

## 2022-03-28 ENCOUNTER — TRANSFERRED RECORDS (OUTPATIENT)
Dept: HEALTH INFORMATION MANAGEMENT | Facility: CLINIC | Age: 27
End: 2022-03-28
Payer: COMMERCIAL

## 2022-03-28 ENCOUNTER — MEDICAL CORRESPONDENCE (OUTPATIENT)
Dept: HEALTH INFORMATION MANAGEMENT | Facility: CLINIC | Age: 27
End: 2022-03-28
Payer: COMMERCIAL

## 2022-03-30 ENCOUNTER — OFFICE VISIT (OUTPATIENT)
Dept: UROLOGY | Facility: CLINIC | Age: 27
End: 2022-03-30
Payer: COMMERCIAL

## 2022-03-30 VITALS
HEIGHT: 65 IN | HEART RATE: 87 BPM | BODY MASS INDEX: 36.32 KG/M2 | SYSTOLIC BLOOD PRESSURE: 121 MMHG | DIASTOLIC BLOOD PRESSURE: 85 MMHG | WEIGHT: 218 LBS

## 2022-03-30 DIAGNOSIS — R10.2 PELVIC PAIN: Primary | ICD-10-CM

## 2022-03-30 PROCEDURE — 52000 CYSTOURETHROSCOPY: CPT | Performed by: UROLOGY

## 2022-03-30 RX ORDER — EPINEPHRINE 0.3 MG/.3ML
INJECTION SUBCUTANEOUS
COMMUNITY
Start: 2022-03-24

## 2022-03-30 RX ORDER — DIPHENHYDRAMINE HCL 25 MG
25 CAPSULE ORAL PRN
COMMUNITY
Start: 2022-03-10 | End: 2024-09-16

## 2022-03-30 RX ORDER — CIPROFLOXACIN 500 MG/1
500 TABLET, FILM COATED ORAL ONCE
Status: ACTIVE | OUTPATIENT
Start: 2022-03-30

## 2022-03-30 ASSESSMENT — PAIN SCALES - GENERAL: PAINLEVEL: MODERATE PAIN (5)

## 2022-03-30 NOTE — LETTER
"3/30/2022       RE: Gregg Maharaj  [address]  [address]  [place] [address]     Dear Colleague,    Thank you for referring your patient, Gregg Maharaj, to the [facility] UROLOGY CLINIC [place] at [facility]. Please see a copy of my visit note below.    Urology Clinic     HPI  Gregg Maharaj is a 26 year old transgender adult with history of urinary urgency and abdominal pain, here for cystoscopy.      He was last seen by Marnie Stout_PAC: \"26 year old adult with persistent symptoms of pelvic pain, cramping with urination, urinary frequency and urgency, and intermittent mild lower back pain.  Patient has completed two courses of antibiotic therapy with both ciprofloxacin and cefdinir with minimal improvement in symptoms, and multiple UA's unremarkable with negative urine cultures.\"    CT urogram was obtained which was essentially unremarkable. He is here for cystoscopy     No changes to health, hospitalizations or new diagnoses in the interim    PHYSICAL EXAM  /85   Pulse 87   Ht 1.651 m (5' 5\")   Wt 98.9 kg (218 lb)   BMI 36.28 kg/m     Constitutional: AO, pleasant, NAD  Resp: Non-labored breathing on room air  Abd: Soft, NT, ND  NESS: Deferred     Labs    Last Comprehensive Metabolic Panel:  Creatinine POCT   Date Value Ref Range Status   02/14/2022 0.8 0.5 - 1.3 mg/dL Final     Comment:     20 y and older Female0.52-1.04 mg/dL  20 y and older Male0.66-1.25 mg/dL    Varies with the amount of muscle mass present.    GICH  Female: 0.6-1.2 mg/dL  Male: 0.7-1.3 mg/dL         GFR, ESTIMATED POCT   Date Value Ref Range Status   02/14/2022 >60 >60 mL/min/1.73m2 Final     Comment:     GFR not calculated when sex unspecified or nonbinary.     Imaging   EXAMINATION: CT UROGRAM WO & W CONTRAST, 2/14/2022 4:13 PM     TECHNIQUE:  Helical CT images from the lung bases through the  symphysis pubis were obtained  without and with contrast using " CT  urogram protocol. Contrast dose: Isovue 370  120cc     COMPARISON: None.     HISTORY: Pelvic pain; Urinary frequency; Urinary urgency     FINDINGS:     Urinary tract: No focal mass, or hydroureteronephrosis or stone.     Remainder of the abdomen and pelvis: Liver, spleen, pancreas, and  bilateral adrenal glands are unremarkable. Bladder is unremarkable.     Lung bases:  Clear.     Bones and soft tissues: No suspicious lesion.                                                                      IMPRESSION: No focal mass, or hydroureteronephrosis or stone. Bladder  is unremarkable.      I have personally reviewed the examination and initial interpretation  and I agree with the findings.     JEVON BARBA MD       Cystoscopy procedure     After sterile preparation and draping of the patient,  a 16-Uruguayan flexible cystoscope was introduced via the urethra.  It was passed without difficulty into the bladder.  The urethra was open without evidence of stricture.  The ureteral orifices were orthotopic.  The bladder mucosa was evaluated using both antegrade and retroflex views and this showed no evidence of any lesions or trabeculation. Scope was then removed and patient tolerated the procedure well.     10 total minutes was spent on cystoscopy procedure alone.          ASSESSMENT AND PLAN  26 year old adult with pelvic pain/urgency with unremarkable CT urogram and cystoscopy       Plan   Refer to Dr. Bains or Jessi to assess for cystitis     40 total minutes spent on the date of the encounter including direct interaction with the patient, performing chart review, documentation and further activities as noted above, exclusive of the time spent on performing cystoscopy       Arden Mercedes MD   Department of Urology   [facility]

## 2022-03-30 NOTE — NURSING NOTE
"Chief Complaint   Patient presents with     Cystoscopy       Blood pressure 121/85, pulse 87, height 1.651 m (5' 5\"), weight 98.9 kg (218 lb). Body mass index is 36.28 kg/m .    Patient Active Problem List   Diagnosis     Acute UTI     VERNON positive     Bilateral leg pain     Dysmenorrhea     Elevated CK     Mild intermittent asthma without complication     Narcolepsy and cataplexy     Panic       Allergies   Allergen Reactions     Capsicum Annuum Extract & Derivative (Bell Pepper) [Capsicum] Anaphylaxis     Adhesive Tape      Other reaction(s): Atopic Dermatitis     Other Environmental Allergy Hives     Cotton seed oil     Metoclopramide Anxiety       Current Outpatient Medications   Medication Sig Dispense Refill     diphenhydrAMINE (BENADRYL) 25 MG capsule Take 25 mg by mouth       EPINEPHrine (ANY BX GENERIC EQUIV) 0.3 MG/0.3ML injection 2-pack Inject into the lateral thigh as needed for life threatening allergic reactions.       albuterol (PROAIR HFA/PROVENTIL HFA/VENTOLIN HFA) 108 (90 Base) MCG/ACT inhaler Inhale 2 puffs into the lungs       cetirizine (ZYRTEC) 10 MG tablet Take 10 mg by mouth       clindamycin-benzoyl peroxide (BENZACLIN) 1-5 % external gel APPLY TO AFFECTED AREA TWICE A DAY IN THE MORNING AND IN THE EVENING       estradiol cypionate (DEPO-ESTRADIOL) 5 MG/ML injection Inject into the muscle every 28 days       gabapentin (NEURONTIN) 300 MG capsule Take 300-600 mg by mouth       ibuprofen (ADVIL/MOTRIN) 200 MG capsule        Methylphenidate HCl (CONCERTA PO) Take 36 mg by mouth daily       Methylphenidate HCl (RITALIN PO) Take 10 mg by mouth 5 times daily       OTHER MEDICAL SUPPLIES        propranolol SR BEADS (INDREAL XL) 120 MG 24 hr capsule Take 120 mg by mouth       Specialty Vitamins Products (VITAMINS FOR HAIR) CAPS Take 1 tablet by mouth daily       SUMAtriptan (IMITREX) 50 MG tablet Take 50 mg by mouth       Testosterone 20.25 MG/1.25GM (1.62%) GEL        venlafaxine (EFFEXOR-ER) 150 " MG TB24 24 hr tablet Take 150 mg by mouth daily (with breakfast)         Social History     Tobacco Use     Smoking status: Never Smoker     Smokeless tobacco: Never Used   Substance Use Topics     Alcohol use: None     Drug use: None       Invasive Procedure Safety Checklist:    Procedure: Cystoscopy    Action: Complete sections and checkboxes as appropriate.    Pre-procedure:  1. Patient ID Verified with 2 identifiers ([place] and  or MRN) : YES    2. Procedure and site verified with patient/designee (when able) : YES    3. Accurate consent documentation in medical record : YES    4. H&P (or appropriate assessment) documented in medical record : N/A  H&P must be up to 30 days prior to procedure an updated within 24 hours of                 Procedure as applicable.     5. Relevant diagnostic and radiology test results appropriately labeled and displayed as applicable : YES    6. Blood products, implants, devices, and/or special equipment available for the procedure as applicable : YES    7. Procedure site(s) marked with provider initials [Exclusions: none] : NO    8. Marking not required. Reason : Yes  Procedure does not require site marking    Time Out:     Time-Out performed immediately prior to starting procedure, including verbal and active participation of all team members addressing: YES    1. Correct patient identity.  2. Confirmed that the correct side and site are marked.  3. An accurate procedure to be done.  4. Agreement on the procedure to be done.  5. Correct patient position.  6. Relevant images and results are properly labeled and appropriately displayed.  7. The need to administer antibiotics or fluids for irrigation purposes during the procedure as applicable.  8. Safety precautions based on patient history or medication use.    During Procedure: Verification of correct person, site, and procedure occurs any time the responsibility for care of the patient is transferred to another member of the  care team.    The following medication was given:     MEDICATION: Ciprofloxacin  ROUTE: PO  SITE: medication was given orally   DOSE: 500 mg  LOT #: [id number]  : [employer]  EXPIRATION DATE: 10/2023  NDC#: 1487-0158-47   Was there drug waste? No    Prior to med admin, verified patient identity using patient's name and date of birth.  Due to med administration, patient instructed to remain in clinic for 15 minutes  afterwards, and to report any adverse reaction to me immediately.    Drug Amount Wasted:  None.  Vial/Syringe: Syringe      Grecia Sawant  3/30/2022  2:37 PM

## 2022-03-30 NOTE — PROGRESS NOTES
"Urology Clinic     HPI  Gregg Maharaj is a 26 year old transgender adult with history of urinary urgency and abdominal pain, here for cystoscopy.      He was last seen by Marnie Stout_PAC: \"26 year old adult with persistent symptoms of pelvic pain, cramping with urination, urinary frequency and urgency, and intermittent mild lower back pain.  Patient has completed two courses of antibiotic therapy with both ciprofloxacin and cefdinir with minimal improvement in symptoms, and multiple UA's unremarkable with negative urine cultures.\"    CT urogram was obtained which was essentially unremarkable. He is here for cystoscopy     No changes to health, hospitalizations or new diagnoses in the interim    PHYSICAL EXAM  /85   Pulse 87   Ht 1.651 m (5' 5\")   Wt 98.9 kg (218 lb)   BMI 36.28 kg/m     Constitutional: AO, pleasant, NAD  Resp: Non-labored breathing on room air  Abd: Soft, NT, ND  NESS: Deferred     Labs    Last Comprehensive Metabolic Panel:  Creatinine POCT   Date Value Ref Range Status   02/14/2022 0.8 0.5 - 1.3 mg/dL Final     Comment:     20 y and older Female0.52-1.04 mg/dL  20 y and older Male0.66-1.25 mg/dL    Varies with the amount of muscle mass present.    GICH  Female: 0.6-1.2 mg/dL  Male: 0.7-1.3 mg/dL         GFR, ESTIMATED POCT   Date Value Ref Range Status   02/14/2022 >60 >60 mL/min/1.73m2 Final     Comment:     GFR not calculated when sex unspecified or nonbinary.     Imaging   EXAMINATION: CT UROGRAM WO & W CONTRAST, 2/14/2022 4:13 PM     TECHNIQUE:  Helical CT images from the lung bases through the  symphysis pubis were obtained  without and with contrast using CT  urogram protocol. Contrast dose: Isovue 370  120cc     COMPARISON: None.     HISTORY: Pelvic pain; Urinary frequency; Urinary urgency     FINDINGS:     Urinary tract: No focal mass, or hydroureteronephrosis or stone.     Remainder of the abdomen and pelvis: Liver, spleen, pancreas, and  bilateral adrenal glands are " unremarkable. Bladder is unremarkable.     Lung bases:  Clear.     Bones and soft tissues: No suspicious lesion.                                                                      IMPRESSION: No focal mass, or hydroureteronephrosis or stone. Bladder  is unremarkable.      I have personally reviewed the examination and initial interpretation  and I agree with the findings.     JEVON BARBA MD       Cystoscopy procedure     After sterile preparation and draping of the patient,  a 16-Slovak flexible cystoscope was introduced via the urethra.  It was passed without difficulty into the bladder.  The urethra was open without evidence of stricture.  The ureteral orifices were orthotopic.  The bladder mucosa was evaluated using both antegrade and retroflex views and this showed no evidence of any lesions or trabeculation. Scope was then removed and patient tolerated the procedure well.     10 total minutes was spent on cystoscopy procedure alone.          ASSESSMENT AND PLAN  26 year old adult with pelvic pain/urgency with unremarkable CT urogram and cystoscopy       Plan   Refer to Dr. Bains or Jessi to assess for cystitis     40 total minutes spent on the date of the encounter including direct interaction with the patient, performing chart review, documentation and further activities as noted above, exclusive of the time spent on performing cystoscopy       Arden Mercedes MD   Department of Urology   [facility]

## 2022-03-30 NOTE — PATIENT INSTRUCTIONS
Please follow up with Dr. Bains or Dr. Bowen for cystitis management.    It was a pleasure meeting with you today.  Thank you for allowing me and my team the privilege of caring for you today.  YOU are the reason we are here, and I truly hope we provided you with the excellent service you deserve.  Please let us know if there is anything else we can do for you so that we can be sure you are leaving completely satisfied with your care experience.

## 2022-04-01 ENCOUNTER — MYC MEDICAL ADVICE (OUTPATIENT)
Dept: PLASTIC SURGERY | Facility: CLINIC | Age: 27
End: 2022-04-01

## 2022-04-01 ENCOUNTER — THERAPY VISIT (OUTPATIENT)
Dept: PHYSICAL THERAPY | Facility: CLINIC | Age: 27
End: 2022-04-01
Payer: COMMERCIAL

## 2022-04-01 DIAGNOSIS — R39.15 URINARY URGENCY: ICD-10-CM

## 2022-04-01 DIAGNOSIS — M99.05 SOMATIC DYSFUNCTION OF PELVIS REGION: ICD-10-CM

## 2022-04-01 PROCEDURE — 97162 PT EVAL MOD COMPLEX 30 MIN: CPT | Mod: GP | Performed by: PHYSICAL THERAPIST

## 2022-04-01 PROCEDURE — 97112 NEUROMUSCULAR REEDUCATION: CPT | Mod: GP | Performed by: PHYSICAL THERAPIST

## 2022-04-01 PROCEDURE — 97530 THERAPEUTIC ACTIVITIES: CPT | Mod: GP | Performed by: PHYSICAL THERAPIST

## 2022-04-01 NOTE — PROGRESS NOTES
SUBJECTIVE:  Patient reports onset of symptoms last winter, saw MD recently on 3/30/2022..Symptoms include painful urination, fevers, joint pain, urinary hesitancy.  Since onset symptoms have been getting better, worse or staying the same? worse    Urination:  Do you leak on the way to the bathroom or with a strong urge to void? No    Do you leak with cough,sneeze, jumping, running?No   Any other activities that cause leaking? No   Do you have triggers that make you feel you can't wait to go to the bathroom? Not asked   Type of pad and number used per day? non  When you leak what is the amount? none    How long can you delay the need to urinate? Not at all.   How many times do you get up to urinate at night? 2-3    Can you stop the flow of urine when on the toilet? Yes but difficult  Is the volume of urine passed usually: large if hydrated. (8sec rule=  250ml with average bladder storing  400-600ml)    Do you strain to pass urine? No  Do you have a slow or hesitant urinary stream? Yes  Do you have difficulty initiating the urine stream? Yes  Is urination painful?  Yes    How many bladder infections have you had in last 12 months?reports several    Fluid intake(one glass is 8oz or one cup) several liters glasses/day, 2-3 cans of cokecaffinated glasses/day  non alcohol glasses/day.    Bowel habits:  Frequency of bowel movements? Usually daily times a week  Consistancy of stool? soft formed,   Do you ignore the urge to defecate? No  Do you strain to pass stool? No    Pelvic Pain:  Do you have any pelvic pain with intercourse, exams, use of tampons? Not asked  Is initial penetration during intercourse painful? Sometimes, lubrication helps  Is deeper penetration painful? At times  Do you use lubricant?   Yes What kind? Water based with some silicone      Given birth? No Any complications? {Are you sexually active?Yes  Have you ever been worried for your physical safety? No  Any abdominal or pelvic surgeries? none  Are  you having any regular exercise?currently using a cane due to pain in the knees with walking  Have you practiced the PF(kegel) exercises for 4 or more weeks?no  Thyroid checked?no(related to hair loss, flu-like symptoms, wt gain/loss, fatigue, menopause)  Changed diet lately?no    OBSERVATION  Lumbar Posture: Positive  Pelvic symmetry: Not Tested: Not indicated   Introitus: tight  Trendelenberg Sign:Not Tested: Contraindicated    ROM  Single leg standing unilateral hip flexion PSIS:Not Tested: Contraindicated  Standing forward flexion PSIS:Not Tested: Contraindicated  Passive Hip ROM:Positive  ESPERANZA:Positive  ESPERANZA with OP:Positive  Posterior Sacral Shear:Not Tested: Not indicated     MUSCEL PERFORMANCE  Active SLR:Not Tested: Contraindicated  Abdominal Core 90/90 hip lower:Not Tested: Contraindicated  Baseline PF tone:hyper  PF Tone with cough: not tested  Valsalva: hypo  PF Response quality: slow return to baseline  PF Power: Center: 1 Endurance: Maximum contraction in seconds: 1  # of endurance contractions before fatigue: na  Quick contraction repetitions prior to fatigue: na.  Specificity/accessory muscles: Not Tested, Synergy with abdominals: poor.  Prolapse: Cyctocele/Rectocele/Uterine stgstrstastdstest:st st1st Urethrocele: none, Enterocele: none.    PALPATION: Reproduction of symptoms with digital evaluations      Physical Therapy Initial Evaluation  Subjective:  The history is provided by the patient. No  was used.   Therapist Generated HPI Evaluation  Problem details: Saw MD for pelvic health issues on 3/30/2022.    Got sick in November with an autoimmune disorder, possibly Lupus. Has fevers, rashes, joint pain, and cystitis. Has endometriosis.  On Depovera.  Was diagnosed with this due to family history.  Was diagnosed with interstitial cystitis.  Going to [facility] right now.  Has joint hypermobility.  Has issues with partial dislocations.  Drinking water helps sometimes.  Still gets  abdominal pain. Taking gabapentin for pain.  Had to resign from working at [employer]   Takes testosterone.  Plans to have top surgery.  Heat helps.  .                                                        Objective:  System                                 Pelvic Dysfunction Evaluation:    Bladder/Pelvic Problems:    Storage Problem:  Frequency and urgency  Emptying Problem:  Hesitancy      Diagnostic Tests:              Cystoscopy:  Interstitial cystitis            Flexibility:  NA (pain in the hips)      Abdominal Wall:  Abdominal wall pelvic: difficulty with abdominal contraction.        Pelvic Clock Exam:      Bulbocavernosis pain:  ++    Levator ANI:  +    SI Provocation:  NA        Reflex Testing:  NA    External Assessment:  normal              Internal Assessment:  NA              SEMG Biofeedback:    Equipment:  MrOmniture with computer    Suraface electrode placement--Perianal:  X  Baseline EMG PM:  2-3uV      Sustained contraction:  1-2 sec, spikes of activity, slow to relax    Additional History:    Number of Pregnancies: 0                         General     ROS    Assessment/Plan:    Patient is a 26 year old adult with bladder pain/pelvic dysfunction complaints.    Patient has the following significant findings with corresponding treatment plan.                Diagnosis 1:  Bladder pain/pelvic dysfunction  Pain -  manual therapy, self management, education and home program  Decreased ROM/flexibility - manual therapy, therapeutic exercise and home program  Decreased strength - therapeutic exercise, therapeutic activities and home program  Inflammation - self management/home program  Impaired muscle performance - biofeedback, neuro re-education and home program  Decreased function - therapeutic activities and home program    Therapy Evaluation Codes:   1) History comprised of:   Personal factors that impact the plan of care:      Time since onset of symptoms.    Comorbidity factors that impact the plan of care  are:      Overweight and autoimmune disease.     Medications impacting care: Anti-depressant, Pain and Sleep.  2) Examination of Body Systems comprised of:   Body structures and functions that impact the plan of care:      Pelvis.   Activity limitations that impact the plan of care are:      Frequency and Urgency.  3) Clinical presentation characteristics are:   Evolving/Changing.  4) Decision-Making    Moderate complexity using standardized patient assessment instrument and/or measureable assessment of functional outcome.  Cumulative Therapy Evaluation is: Moderate complexity.    Previous and current functional limitations:  (See Goal Flow Sheet for this information)    Short term and Long term goals: (See Goal Flow Sheet for this information)     Communication ability:  Patient appears to be able to clearly communicate and understand verbal and written communication and follow directions correctly.  Treatment Explanation - The following has been discussed with the patient:   RX ordered/plan of care  Anticipated outcomes  Possible risks and side effects  This patient would benefit from PT intervention to resume normal activities.   Rehab potential is good.    Frequency:  1 X a month, once daily  Duration:  for 4 months  Discharge Plan:  Independent in home treatment program.  Reach maximal therapeutic benefit.    Please refer to the daily flowsheet for treatment today, total treatment time and time spent performing 1:1 timed codes.

## 2022-04-01 NOTE — PROGRESS NOTES
[facility]    OUTPATIENT Physical Therapy ORTHOPEDIC EVALUATION  PLAN OF TREATMENT FOR OUTPATIENT REHABILITATION  (COMPLETE FOR INITIAL CLAIMS ONLY)  Patient's Last Name, First Name, M.I.  YOB: 1995  Gregg Maharaj    Provider s Name:  [facility]   Medical Record No.  [id number]   Start of Care Date:  04/01/22   Onset Date:    (3/30/2022, date patient saw MD)   Type:     _X__PT   ___OT Medical Diagnosis:    Encounter Diagnoses   Name Primary?     Urinary urgency      Somatic dysfunction of pelvis region         Treatment Diagnosis:  interstitial cystitis        Goals:     04/01/22 0700   Urination Dysfunction   Previous Functional Level Normal   Current Functional Level Pain level with voiding;Hesitation with voiding   Performance Level pain 6/10 at rest/ bladder   STG Target Performance Decrease pain level with voiding   Performance Level to 3/10   Rationale painfree urination;Work toward normal voiding patterns to focus on ADLS, work, or school   Due Date 05/01/22   LTG Target Performance Decrease hesitation with voiding   Performance Level no hesitation or pain   Rationale painfree urination;Work toward normal voiding patterns to focus on ADLS, work, or school   Due Date 08/01/22       Therapy Frequency:  once a month  Predicted Duration of Therapy Intervention:  4 months    Tracey Medina, PT                 I CERTIFY THE NEED FOR THESE SERVICES FURNISHED UNDER        THIS PLAN OF TREATMENT AND WHILE UNDER MY CARE     (Physician attestation of this document indicates review and certification of the therapy plan).                       Certification Date From:  04/01/22   Certification Date To:  06/29/22    Referring Provider:  Marnie Stout    Initial Assessment        See Epic Evaluation SOC Date: 04/01/22

## 2022-04-04 NOTE — PROGRESS NOTES
Physical Therapy Initial Evaluation  Subjective:    Patient Health History           General health as reported by patient is poor.  Pertinent medical history includes: seizures, mental illness, depression, fibromyalgia, asthma, migraines/headaches, sleep disorder/apnea and concussions/dizziness. Other medical history details: dx of intestitial cystitis.   Red flags:  Unexplained weight loss, changes in bowel and bladder habits, pain at rest/night, chest pain, persistent fever/chills and significant weakness.  Medical allergies: adhesives. Other medical allergies details: reglan,chili peppers.       Current medications:  Hormone replacement therapy, sleep medication, pain medication, muscle relaxants, anti-depressants and other. Other medications details: stimulants for adhd and wakefulness.    Current occupation is none.   Primary job tasks include:  Repetitive tasks.   Other job/home tasks details: [employer], [employer], draw, working on [employer].                                  Objective:  System    Physical Exam    General     ROS    Assessment/Plan:

## 2022-04-07 ENCOUNTER — TELEPHONE (OUTPATIENT)
Dept: PLASTIC SURGERY | Facility: CLINIC | Age: 27
End: 2022-04-07
Payer: COMMERCIAL

## 2022-04-12 ENCOUNTER — TELEPHONE (OUTPATIENT)
Dept: PLASTIC SURGERY | Facility: CLINIC | Age: 27
End: 2022-04-12
Payer: COMMERCIAL

## 2022-04-12 NOTE — TELEPHONE ENCOUNTER
[facility] :  Care Coordination Note     SITUATION   Gregg Maharaj is a 26 year old adult who is receiving support for:  No chief complaint on file.  .    BACKGROUND     LOS & DA reviewed. They meet requirements    ASSESSMENT     Surgery              CGC Assessment  [facility] [employer] [facility] ([facility]) Enrollment: Enrolled  Patient has a therapist: Yes  Letter of support #1: Received  Letter #1 Date: 04/01/22  Letter of support #2: Received  Letter #2 Date: 04/01/22  Surgery being considered: Yes                          PLAN          Nursing Interventions:      Follow-up plan:  LOS & DA are all set. Patient reported he'll get mammogram today. RNCCs to review results and request orders.        RUKHSANA HENDERSON PA-C

## 2022-04-14 ENCOUNTER — PRE VISIT (OUTPATIENT)
Dept: UROLOGY | Facility: CLINIC | Age: 27
End: 2022-04-14
Payer: COMMERCIAL

## 2022-04-14 NOTE — TELEPHONE ENCOUNTER
Reason for visit: Assess pelvic pain and cystitis     Relevant information: pt on testosterone    Records/imaging/labs/orders: all records available    Pt called: no need for a call      Julia Leigh CMA  4/14/2022  7:11 AM

## 2022-04-27 DIAGNOSIS — F64.0 GENDER DYSPHORIA IN ADOLESCENT AND ADULT: Primary | ICD-10-CM

## 2022-04-27 RX ORDER — CEFAZOLIN SODIUM 2 G/50ML
2 SOLUTION INTRAVENOUS
Status: CANCELLED | OUTPATIENT
Start: 2022-04-27

## 2022-04-27 RX ORDER — CEFAZOLIN SODIUM 2 G/50ML
2 SOLUTION INTRAVENOUS SEE ADMIN INSTRUCTIONS
Status: CANCELLED | OUTPATIENT
Start: 2022-04-27

## 2022-04-28 ENCOUNTER — VIRTUAL VISIT (OUTPATIENT)
Dept: UROLOGY | Facility: CLINIC | Age: 27
End: 2022-04-28
Payer: COMMERCIAL

## 2022-04-28 DIAGNOSIS — M99.05 SOMATIC DYSFUNCTION OF PELVIS REGION: Primary | ICD-10-CM

## 2022-04-28 DIAGNOSIS — N32.81 URGENCY-FREQUENCY SYNDROME: ICD-10-CM

## 2022-04-28 DIAGNOSIS — R10.2 PELVIC PAIN: ICD-10-CM

## 2022-04-28 PROCEDURE — 99214 OFFICE O/P EST MOD 30 MIN: CPT | Mod: 95 | Performed by: UROLOGY

## 2022-04-28 RX ORDER — FESOTERODINE FUMARATE 4 MG/1
4 TABLET, FILM COATED, EXTENDED RELEASE ORAL DAILY
Qty: 30 TABLET | Refills: 3 | Status: SHIPPED | OUTPATIENT
Start: 2022-04-28 | End: 2022-09-12

## 2022-04-28 RX ORDER — LORATADINE 10 MG/1
10 TABLET ORAL EVERY MORNING
COMMUNITY
Start: 2022-03-10

## 2022-04-28 RX ORDER — MONTELUKAST SODIUM 10 MG/1
10 TABLET ORAL AT BEDTIME
COMMUNITY
Start: 2022-03-24

## 2022-04-28 RX ORDER — DIAZEPAM 5 MG
TABLET ORAL
Qty: 40 TABLET | Refills: 3 | Status: SHIPPED | OUTPATIENT
Start: 2022-04-28 | End: 2024-08-14

## 2022-04-28 ASSESSMENT — PAIN SCALES - GENERAL: PAINLEVEL: MODERATE PAIN (4)

## 2022-04-28 NOTE — NURSING NOTE
Chief Complaint   Patient presents with     RECHECK     Discuss cystitis management options       There were no vitals taken for this visit. There is no height or weight on file to calculate BMI.    Patient Active Problem List   Diagnosis     Acute UTI     VERNON positive     Bilateral leg pain     Dysmenorrhea     Elevated CK     Mild intermittent asthma without complication     Narcolepsy and cataplexy     Panic     Urinary urgency     Somatic dysfunction of pelvis region       Allergies   Allergen Reactions     Capsicum Annuum Extract & Derivative (Bell Pepper) [Capsicum] Anaphylaxis     Adhesive Tape      Other reaction(s): Atopic Dermatitis     Other Environmental Allergy Hives     Cotton seed oil     Metoclopramide Anxiety       Current Outpatient Medications   Medication Sig Dispense Refill     loratadine (CLARITIN) 10 MG tablet Take 10 mg by mouth       montelukast (SINGULAIR) 10 MG tablet Take 10 mg by mouth daily       albuterol (PROAIR HFA/PROVENTIL HFA/VENTOLIN HFA) 108 (90 Base) MCG/ACT inhaler Inhale 2 puffs into the lungs       cetirizine (ZYRTEC) 10 MG tablet Take 10 mg by mouth       clindamycin-benzoyl peroxide (BENZACLIN) 1-5 % external gel APPLY TO AFFECTED AREA TWICE A DAY IN THE MORNING AND IN THE EVENING (Patient not taking: Reported on 4/28/2022)       diphenhydrAMINE (BENADRYL) 25 MG capsule Take 25 mg by mouth       EPINEPHrine (ANY BX GENERIC EQUIV) 0.3 MG/0.3ML injection 2-pack Inject into the lateral thigh as needed for life threatening allergic reactions.       estradiol cypionate (DEPO-ESTRADIOL) 5 MG/ML injection Inject into the muscle every 28 days       gabapentin (NEURONTIN) 300 MG capsule Take 300-600 mg by mouth       ibuprofen (ADVIL/MOTRIN) 200 MG capsule        Methylphenidate HCl (CONCERTA PO) Take 36 mg by mouth daily       Methylphenidate HCl (RITALIN PO) Take 10 mg by mouth 5 times daily       OTHER MEDICAL SUPPLIES        propranolol SR BEADS (INDREAL XL) 120 MG 24 hr  capsule Take 120 mg by mouth       Specialty Vitamins Products (VITAMINS FOR HAIR) CAPS Take 1 tablet by mouth daily       SUMAtriptan (IMITREX) 50 MG tablet Take 50 mg by mouth       Testosterone 20.25 MG/1.25GM (1.62%) GEL        venlafaxine (EFFEXOR-ER) 150 MG TB24 24 hr tablet Take 150 mg by mouth daily (with breakfast)         Social History     Tobacco Use     Smoking status: Never Smoker     Smokeless tobacco: Never Used       Julia Leigh CMA  4/28/2022  1:25 PM

## 2022-04-28 NOTE — LETTER
4/28/2022       RE: Gregg Maharaj  [facility]  [address]  [place] [address]     Dear Colleague,    Thank you for referring your patient, Gregg Maharaj, to the [facility] UROLOGY CLINIC [place] at [facility]. Please see a copy of my visit note below.    Gregg is a 26 year old who is being evaluated via a billable video visit.      How would you like to obtain your AVS? MyChart  If the video visit is dropped, the invitation should be resent by: Send to e-mail at: [email or web address]  Will anyone else be joining your video visit? No      Video Start Time: 1:33 PM     April 28, 2022    Gregg was seen today for recheck.    Diagnoses and all orders for this visit:    Somatic dysfunction of pelvis region  -     diazepam (VALIUM) 5 MG tablet; VAGINAL VALIUM SUPPOSITORY 5MG AT NIGHT AND PRIOR TO THERAPY AS NEEDED    Urgency-frequency syndrome  -     diazepam (VALIUM) 5 MG tablet; VAGINAL VALIUM SUPPOSITORY 5MG AT NIGHT AND PRIOR TO THERAPY AS NEEDED  -     fesoterodine fumarate (TOVIAZ) 4 MG TB24 24 hr tablet; Take 1 tablet (4 mg) by mouth daily    Pelvic pain  -     diazepam (VALIUM) 5 MG tablet; VAGINAL VALIUM SUPPOSITORY 5MG AT NIGHT AND PRIOR TO THERAPY AS NEEDED     Urgency frequency very bothersome.  He is already in PT so discussed options and he would like to try a medication.  We discussed the anticholinergics and beta 3 agonists.  He has elected to try one of the newer anticholinergics    Significant pelvic pain and discussed vaginal valium.  Discussed its side effects.  He has a partner living with him to make sure that he is not too sedated from the medications, but given other medications will start on lower dose    25 minutes were spent today on the day of the encounter in reviewing Jeet DYKES's notes, Dr Mercedes's notes, direct patient care including prescription medications, coordination of care and documentation    Lani Bowen MD  MPH  (she/her/hers)   of Urology  [facility]    Subjective    Urgency frequency, bladder pain.  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  Initially saw Jeet Stout 1/28/22 for virtual visit.  Had a normal cystoscopy 3/30/22 with Dr Jensen.    There were no vitals taken for this visit.  GENERAL: healthy, alert and no distress  EYES: Eyes grossly normal to inspection, conjunctivae and sclerae normal  HENT: normal cephalic/atraumatic.  External ears, nose and mouth without ulcers or lesions.  RESP: no audible wheeze, cough, or visible cyanosis.  No visible retractions or increased work of breathing.  Able to speak fully in complete sentences.  NEURO: Cranial nerves grossly intact, mentation intact and speech normal  PSYCH: mentation appears normal, affect normal/bright, judgement and insight intact, normal speech and appearance well-groomed    CC  Patient Care Team:  Jessica Cardoso as PCP - General (Internal Medicine)  Anita Grant MD as MD (Plastic Surgery)  Marnie Stout PA as Physician Assistant (Urology)  Usama Jackson MD as MD (Dermatology)  Monserrat Celeste PA-C as Physician Assistant (Dermatology)  Feng Terry PA-C as  (Plastic Surgery)  Mayte Jensen MD as Assigned Surgical Provider  MAYTE JENSEN        Video-Visit Details    Type of service:  Video Visit    Video End Time:1:55 PM    Originating Location (pt. Location): Home    Distant Location (provider location):  [facility] UROLOGY CLINIC [place]     Platform used for Video Visit: [employer]

## 2022-04-28 NOTE — PATIENT INSTRUCTIONS
Take the medications as directed    Continue pelvic floor therapy    Return to see us in 3 months, sooner if needed    It was a pleasure meeting with you today.  Thank you for allowing me and my team the privilege of caring for you today.  YOU are the reason we are here, and I truly hope we provided you with the excellent service you deserve.  Please let us know if there is anything else we can do for you so that we can be sure you are leaving completely satisfied with your care experience.

## 2022-04-28 NOTE — PROGRESS NOTES
Gregg is a 26 year old who is being evaluated via a billable video visit.      How would you like to obtain your AVS? MyChart  If the video visit is dropped, the invitation should be resent by: Send to e-mail at: [email or web address]  Will anyone else be joining your video visit? No      Video Start Time: 1:33 PM     April 28, 2022    Gregg was seen today for recheck.    Diagnoses and all orders for this visit:    Somatic dysfunction of pelvis region  -     diazepam (VALIUM) 5 MG tablet; VAGINAL VALIUM SUPPOSITORY 5MG AT NIGHT AND PRIOR TO THERAPY AS NEEDED    Urgency-frequency syndrome  -     diazepam (VALIUM) 5 MG tablet; VAGINAL VALIUM SUPPOSITORY 5MG AT NIGHT AND PRIOR TO THERAPY AS NEEDED  -     fesoterodine fumarate (TOVIAZ) 4 MG TB24 24 hr tablet; Take 1 tablet (4 mg) by mouth daily    Pelvic pain  -     diazepam (VALIUM) 5 MG tablet; VAGINAL VALIUM SUPPOSITORY 5MG AT NIGHT AND PRIOR TO THERAPY AS NEEDED     Urgency frequency very bothersome.  He is already in PT so discussed options and he would like to try a medication.  We discussed the anticholinergics and beta 3 agonists.  He has elected to try one of the newer anticholinergics    Significant pelvic pain and discussed vaginal valium.  Discussed its side effects.  He has a partner living with him to make sure that he is not too sedated from the medications, but given other medications will start on lower dose    25 minutes were spent today on the day of the encounter in reviewing Jeet DYKES's notes, Dr Mercedes's notes, direct patient care including prescription medications, coordination of care and documentation    Lani Bowen MD MPH  (she/her/hers)   of Urology  [facility]    Subjective    Urgency frequency, bladder pain.  Has fibromyalgia, depression     Loss of strength given the pain    Working with Tracey for PT    Cadmium the cat joins him for the visit.  (Patient is an artist)    He denies any changes in  health since last visit.  Initially saw Jeet Stout 1/28/22 for virtual visit.  Had a normal cystoscopy 3/30/22 with Dr Jensen.    There were no vitals taken for this visit.  GENERAL: healthy, alert and no distress  EYES: Eyes grossly normal to inspection, conjunctivae and sclerae normal  HENT: normal cephalic/atraumatic.  External ears, nose and mouth without ulcers or lesions.  RESP: no audible wheeze, cough, or visible cyanosis.  No visible retractions or increased work of breathing.  Able to speak fully in complete sentences.  NEURO: Cranial nerves grossly intact, mentation intact and speech normal  PSYCH: mentation appears normal, affect normal/bright, judgement and insight intact, normal speech and appearance well-groomed    CC  Patient Care Team:  Jessica Cardoso as PCP - General (Internal Medicine)  Anita Grant MD as MD (Plastic Surgery)  Marnie Stout PA as Physician Assistant (Urology)  Usama Jackson MD as MD (Dermatology)  Monserrat Celeste PA-C as Physician Assistant (Dermatology)  Feng Terry PA-C as  (Plastic Surgery)  Mayte Jensen MD as Assigned Surgical Provider  MAYTE JENSEN        Video-Visit Details    Type of service:  Video Visit    Video End Time:1:55 PM    Originating Location (pt. Location): Home    Distant Location (provider location):  [facility] UROLOGY CLINIC [place]     Platform used for Video Visit: [email or web address]

## 2022-05-02 ENCOUNTER — TELEPHONE (OUTPATIENT)
Dept: DERMATOLOGY | Facility: CLINIC | Age: 27
End: 2022-05-02
Payer: COMMERCIAL

## 2022-05-02 NOTE — TELEPHONE ENCOUNTER
Spoke to patient and let them know that their appointment will have to be virtual. They were okay with this    Rosario Saavedra Procedure

## 2022-05-05 ENCOUNTER — TELEPHONE (OUTPATIENT)
Dept: UROLOGY | Facility: CLINIC | Age: 27
End: 2022-05-05
Payer: COMMERCIAL

## 2022-05-05 NOTE — TELEPHONE ENCOUNTER
Gisela Alicea 50 minutes ago (1:16 PM)     MT    It should be in her folder to be signed today.       Writer called  [facility] to inform them that this was received and has been given to Dr Bowen to be signed today. Will be faxed tomorrow.

## 2022-05-05 NOTE — TELEPHONE ENCOUNTER
[facility] Call Center    Phone Message    May a detailed message be left on voicemail: yes     Reason for Call: Form or Letter   Type or form/letter needing completion: Advanced Member Notice of Non Coverage Form  Provider: Jessi  Date form needed: ASAP  Once completed: Adrienne from  [facility] is reaching ou tto see if Dr. Gottlieb received this form for signature on 04/29/22.  Adrienne states the form was faxed 04/29/22 and a message was sent on 05/03/22 as well.  Adrienne would like a callback to discuss.  Thank you.      Action Taken: Other: Urology    Travel Screening: Not Applicable

## 2022-05-12 ENCOUNTER — TELEPHONE (OUTPATIENT)
Dept: UROLOGY | Facility: CLINIC | Age: 27
End: 2022-05-12
Payer: COMMERCIAL

## 2022-05-12 NOTE — TELEPHONE ENCOUNTER
Call patient to schedule follow up in the Urology Clinic per checkout visit on 4/28/22 with Dr Bowen.  Left message with clinic number and MyChart message sen to schedule a 3 months in person PVR and symptom check.

## 2022-05-12 NOTE — TELEPHONE ENCOUNTER
FUTURE VISIT INFORMATION      FUTURE VISIT INFORMATION:    Date: 9.1.22    Time: 3:15    Location: [facility]  REFERRAL INFORMATION:    Referring provider:  Walker    Referring providers clinic:  [facility]    Reason for visit/diagnosis  Alopecia [L65.9]/ referred by Dr Jessica Hansen/ zaida emailed to clinic/ appt sched per pt    RECORDS REQUESTED FROM:       Clinic name Comments Records Status   [facility] 2.11.22  [facility]

## 2022-06-01 ENCOUNTER — VIRTUAL VISIT (OUTPATIENT)
Dept: DERMATOLOGY | Facility: CLINIC | Age: 27
End: 2022-06-01
Payer: COMMERCIAL

## 2022-06-01 DIAGNOSIS — M24.9 HYPERMOBILE JOINTS: ICD-10-CM

## 2022-06-01 DIAGNOSIS — L73.9 FOLLICULITIS: Primary | ICD-10-CM

## 2022-06-01 DIAGNOSIS — N32.81 URGENCY-FREQUENCY SYNDROME: Primary | ICD-10-CM

## 2022-06-01 DIAGNOSIS — L24.9 IRRITANT HAND DERMATITIS: ICD-10-CM

## 2022-06-01 PROCEDURE — 99204 OFFICE O/P NEW MOD 45 MIN: CPT | Mod: TEL | Performed by: PHYSICIAN ASSISTANT

## 2022-06-01 RX ORDER — OXYBUTYNIN CHLORIDE 5 MG/1
5 TABLET, EXTENDED RELEASE ORAL DAILY
Qty: 30 TABLET | Refills: 1 | Status: SHIPPED | OUTPATIENT
Start: 2022-06-01 | End: 2022-07-01

## 2022-06-01 NOTE — LETTER
6/1/2022       RE: Gregg Maharaj  [address] [place] [address]  [address]  [place] [address]     Dear Colleague,    Thank you for referring your patient, Gregg Maharaj, to the [facility] DERMATOLOGY CLINIC [place] at [facility]. Please see a copy of my visit note below.    [facility] Dermatology Note  Encounter Date: Jun 1, 2022  Store-and-Forward and Telephone ([phone] ). Location of teledermatologist: [facility] DERMATOLOGY CLINIC [place].  Start time: 02:5 End time:03:11.(23 min)    Dermatology Problem List:  1. Scalp folliculitis- ketoconazole 2% shampoo  2. Irritant hand dermatitis- triamcinolone 0.1% ointment bid  3. Referred to genetics- family hx of EDS    ____________________________________________    Assessment & Plan:     # Irritant hand dermatitis-  Start triamcinolone 0.1% ointment bid until resolved, then as needed. Steroid ed given  Moisturize through out the day.    # Scalp folliculitis,  Start ketoconazole 2% shampoo every other day in the shower. Let sit x 3-5 min and rinse.     Hx of atopic dermatitis, well controlled on moisturizers and gentle skin cares.   -Continue gentle skin cares.     Hypermobile joints and family hx of EDS-  Referral to genetics for possible genetic testing.     Hx of + January and sun-sensitivity,  -Continue sun protection  -Recommend f/u in the office. Could potentially bx.       Procedures Performed:    None    Follow-up: 3-4 month(s) in-person, or earlier for new or changing lesions    Staff:     All risk, benefits and alternatives were discussed with patient.  Patient is in agreement and understands the assessment and plan.  All questions were answered.  Sun Screen Education was given.   Return to Clinic in 3-4 months or sooner as needed.   Monserrat Celeste PA-C   ____________________________________________    CC: Follow Up (Gregg, is here for a Eczema consult and states he is reacting  to sun exposure, he also is concerned about lupus)    HPI:  Mr. Gregg Maharaj is a(n) 26 year old adult who presents today as a new patient for rashes.     He reports he has had eczema since he was an infant.  Eczema in the family. Sunsensitivy in the family and Malar rashes. Autoimmune in the family, Lupus and EDS, RA.  Grandmother and sister and potentially his mother.   He is currently working with rheumatologist to get a better understanding of his symptoms including pain in all of his joints.   At one point he was bed bound for two weeks due to body pain. All joints. Struggling dislocations at time. Has been diagnosed with hypermobile joint, moderate to severe and was recommended to get ring splints.  He started medial leave started in November 2021 and then officially resigned in February 2022.     At times he has elevated temps of 99.5- 100. Pain gets worse with tempature spikes. Working with rheumatologist to determine EDS vs Lupus.     He was recently on a prednisone taper, x 12 days. All symptoms improved but now off and symptoms are back.    For his eczema, his mom would make different lotions growing up. His eczema is worse with friction (such as tags from clothing) and scented products.     He washes in the shower and then dries off right away. He uses an unscented Bronners baby soap.  He moisturizes with First aid by [employer] and working hands or body lotion on his hands.     He take antihistamines for season allergies.     He finds he does get rashes on his face or possibly sun burn with sun exposure, so he tries to avoid the sun.      Patient is otherwise feeling well, without additional skin concerns.    Labs Reviewed:  Reviewed labs from 7/9/21 +VERNON Dense fine speckled at 1:320.  -Labs from 12/8/21 and 1/18/22 and 2/11/22 and 2/28/22    Physical Exam:  Vitals: There were no vitals taken for this visit.  SKIN: Teledermatology photos were reviewed; image quality and interpretability: acceptable.  Image date: 5/25/22  - Excoriated papules on the face.  -Faint pink scaly plaques to the dorsal hands.   - No other lesions of concern on areas examined.                                                                                                             Medications:  Current Outpatient Medications   Medication     albuterol (PROAIR HFA/PROVENTIL HFA/VENTOLIN HFA) 108 (90 Base) MCG/ACT inhaler     cetirizine (ZYRTEC) 10 MG tablet     diazepam (VALIUM) 5 MG tablet     diphenhydrAMINE (BENADRYL) 25 MG capsule     EPINEPHrine (ANY BX GENERIC EQUIV) 0.3 MG/0.3ML injection 2-pack     estradiol cypionate (DEPO-ESTRADIOL) 5 MG/ML injection     fesoterodine fumarate (TOVIAZ) 4 MG TB24 24 hr tablet     gabapentin (NEURONTIN) 300 MG capsule     ibuprofen (ADVIL/MOTRIN) 200 MG capsule     loratadine (CLARITIN) 10 MG tablet     Methylphenidate HCl (CONCERTA PO)     Methylphenidate HCl (RITALIN PO)     montelukast (SINGULAIR) 10 MG tablet     OTHER MEDICAL SUPPLIES     propranolol SR BEADS (INDREAL XL) 120 MG 24 hr capsule     Specialty Vitamins Products (VITAMINS FOR HAIR) CAPS     SUMAtriptan (IMITREX) 50 MG tablet     Testosterone 20.25 MG/1.25GM (1.62%) GEL     venlafaxine (EFFEXOR-ER) 150 MG TB24 24 hr tablet     clindamycin-benzoyl peroxide (BENZACLIN) 1-5 % external gel     Current Facility-Administered Medications   Medication     ciprofloxacin (CIPRO) tablet 500 mg      Past Medical/Surgical History:   Patient Active Problem List   Diagnosis     Acute UTI     VERNON positive     Bilateral leg pain     Dysmenorrhea     Elevated CK     Mild intermittent asthma without complication     Narcolepsy and cataplexy     Panic     Urinary urgency     Somatic dysfunction of pelvis region     No past medical history on file.    CC Jessica Cardoso  [facility]  [address]  [place] [address] on close of this encounter.

## 2022-06-01 NOTE — PROGRESS NOTES
[facility] Dermatology Note  Encounter Date: Jun 1, 2022  Store-and-Forward and Telephone ([phone] ). Location of teledermatologist: [facility] DERMATOLOGY [facility]INIC [place].  Start time: 02:5 End time:03:11.(23 min)    Dermatology Problem List:  1. Scalp folliculitis- ketoconazole 2% shampoo  2. Irritant hand dermatitis- triamcinolone 0.1% ointment bid  3. Referred to genetics- family hx of EDS    ____________________________________________    Assessment & Plan:     # Irritant hand dermatitis-  Start triamcinolone 0.1% ointment bid until resolved, then as needed. Steroid ed given  Moisturize through out the day.    # Scalp folliculitis,  Start ketoconazole 2% shampoo every other day in the shower. Let sit x 3-5 min and rinse.     Hx of atopic dermatitis, well controlled on moisturizers and gentle skin cares.   -Continue gentle skin cares.     Hypermobile joints and family hx of EDS-  Referral to genetics for possible genetic testing.     Hx of + January and sun-sensitivity,  -Continue sun protection  -Recommend f/u in the office. Could potentially bx.       Procedures Performed:    None    Follow-up: 3-4 month(s) in-person, or earlier for new or changing lesions    Staff:     All risk, benefits and alternatives were discussed with patient.  Patient is in agreement and understands the assessment and plan.  All questions were answered.  Sun Screen Education was given.   Return to Clinic in 3-4 months or sooner as needed.   Monserrat Celeste PA-C   ____________________________________________    CC: Follow Up (Gregg, is here for a Eczema consult and states he is reacting to sun exposure, he also is concerned about lupus)    HPI:  Mr. Gregg Maharaj is a(n) 26 year old adult who presents today as a new patient for rashes.     He reports he has had eczema since he was an infant.  Eczema in the family. Sunsensitivy in the family and Malar rashes. Autoimmune in the family, Lupus and EDS, RA.   Grandmother and sister and potentially his mother.   He is currently working with rheumatologist to get a better understanding of his symptoms including pain in all of his joints.   At one point he was bed bound for two weeks due to body pain. All joints. Struggling dislocations at time. Has been diagnosed with hypermobile joint, moderate to severe and was recommended to get ring splints.  He started medial leave started in November 2021 and then officially resigned in February 2022.     At times he has elevated temps of 99.5- 100. Pain gets worse with tempature spikes. Working with rheumatologist to determine EDS vs Lupus.     He was recently on a prednisone taper, x 12 days. All symptoms improved but now off and symptoms are back.    For his eczema, his mom would make different lotions growing up. His eczema is worse with friction (such as tags from clothing) and scented products.     He washes in the shower and then dries off right away. He uses an unscented Bronners baby soap.  He moisturizes with First aid by [employer] and working hands or body lotion on his hands.     He take antihistamines for season allergies.     He finds he does get rashes on his face or possibly sun burn with sun exposure, so he tries to avoid the sun.      Patient is otherwise feeling well, without additional skin concerns.    Labs Reviewed:  Reviewed labs from 7/9/21 +VERNON Dense fine speckled at 1:320.  -Labs from 12/8/21 and 1/18/22 and 2/11/22 and 2/28/22    Physical Exam:  Vitals: There were no vitals taken for this visit.  SKIN: Teledermatology photos were reviewed; image quality and interpretability: acceptable. Image date: 5/25/22  - Excoriated papules on the face.  -Faint pink scaly plaques to the dorsal hands.   - No other lesions of concern on areas examined.                                                                                                             Medications:  Current Outpatient Medications   Medication      albuterol (PROAIR HFA/PROVENTIL HFA/VENTOLIN HFA) 108 (90 Base) MCG/ACT inhaler     cetirizine (ZYRTEC) 10 MG tablet     diazepam (VALIUM) 5 MG tablet     diphenhydrAMINE (BENADRYL) 25 MG capsule     EPINEPHrine (ANY BX GENERIC EQUIV) 0.3 MG/0.3ML injection 2-pack     estradiol cypionate (DEPO-ESTRADIOL) 5 MG/ML injection     fesoterodine fumarate (TOVIAZ) 4 MG TB24 24 hr tablet     gabapentin (NEURONTIN) 300 MG capsule     ibuprofen (ADVIL/MOTRIN) 200 MG capsule     loratadine (CLARITIN) 10 MG tablet     Methylphenidate HCl (CONCERTA PO)     Methylphenidate HCl (RITALIN PO)     montelukast (SINGULAIR) 10 MG tablet     OTHER MEDICAL SUPPLIES     propranolol SR BEADS (INDREAL XL) 120 MG 24 hr capsule     Specialty Vitamins Products (VITAMINS FOR HAIR) CAPS     SUMAtriptan (IMITREX) 50 MG tablet     Testosterone 20.25 MG/1.25GM (1.62%) GEL     venlafaxine (EFFEXOR-ER) 150 MG TB24 24 hr tablet     clindamycin-benzoyl peroxide (BENZACLIN) 1-5 % external gel     Current Facility-Administered Medications   Medication     ciprofloxacin (CIPRO) tablet 500 mg      Past Medical/Surgical History:   Patient Active Problem List   Diagnosis     Acute UTI     VERNON positive     Bilateral leg pain     Dysmenorrhea     Elevated CK     Mild intermittent asthma without complication     Narcolepsy and cataplexy     Panic     Urinary urgency     Somatic dysfunction of pelvis region     No past medical history on file.    CC [facility]  [employer] [facility]  [address] [facility] [address]  [place] [address] on close of this encounter.

## 2022-06-01 NOTE — NURSING NOTE
Chief Complaint   Patient presents with     Follow Up     Gregg, is here for a Eczema consult and states he is reacting to sun exposure, he also is concerned about lupus    Teledermatology Nurse Call Patients:     Are you in the [facility] at the time of the encounter? yes    Today's visit will be billed to you and your insurance.    A teledermatology visit is not as thorough as an in-person visit and the quality of the photograph sent may not be of the same quality as that taken by the dermatology clinic.  Gamal Kwon VF

## 2022-06-07 RX ORDER — KETOCONAZOLE 20 MG/ML
SHAMPOO TOPICAL EVERY OTHER DAY
Qty: 120 ML | Refills: 3 | Status: SHIPPED | OUTPATIENT
Start: 2022-06-07 | End: 2023-02-06

## 2022-06-07 RX ORDER — TRIAMCINOLONE ACETONIDE 1 MG/G
OINTMENT TOPICAL 2 TIMES DAILY
Qty: 80 G | Refills: 1 | Status: SHIPPED | OUTPATIENT
Start: 2022-06-07 | End: 2023-02-06

## 2022-06-08 ENCOUNTER — TELEPHONE (OUTPATIENT)
Dept: DERMATOLOGY | Facility: CLINIC | Age: 27
End: 2022-06-08
Payer: COMMERCIAL

## 2022-06-08 NOTE — TELEPHONE ENCOUNTER
Prior Authorization Retail Medication Request    Medication/Dose: ketoconazole (NIZORAL) 2 % external shampoo  ICD code (if different than what is on RX):  Folliculitis [L73.9]   Previously Tried and Failed:    Rationale:      Insurance Name:  [facility]  Insurance ID:  [id number]

## 2022-06-09 NOTE — TELEPHONE ENCOUNTER
Prior Authorization Not Needed per Insurance    Medication: ketoconazole (NIZORAL) 2 % external shampoo  Insurance Company: [place] - Phone [phone] Fax [phone]  Expected CoPay:      Pharmacy Filling the Rx: [id number] - [place], [place] - [address] AT [facility] [address]  Pharmacy Notified: Yes  Patient Notified: Yes      Medication is covered under insurance however the pharmacy needs to find a covered NDC that participates in the National Drug Rebate Program through the state PMAP plans.     Called [facility], gave them covered NDC's that I could locate.  They will need to call the help desk for further assistance as a PA is not needed. No further action can be taken by PA team

## 2022-06-15 ENCOUNTER — THERAPY VISIT (OUTPATIENT)
Dept: PHYSICAL THERAPY | Facility: CLINIC | Age: 27
End: 2022-06-15
Payer: COMMERCIAL

## 2022-06-15 DIAGNOSIS — R39.15 URINARY URGENCY: Primary | ICD-10-CM

## 2022-06-15 DIAGNOSIS — M99.05 SOMATIC DYSFUNCTION OF PELVIS REGION: ICD-10-CM

## 2022-06-15 PROCEDURE — 97140 MANUAL THERAPY 1/> REGIONS: CPT | Mod: GP | Performed by: PHYSICAL THERAPIST

## 2022-06-15 PROCEDURE — 97110 THERAPEUTIC EXERCISES: CPT | Mod: GP | Performed by: PHYSICAL THERAPIST

## 2022-06-15 PROCEDURE — 97530 THERAPEUTIC ACTIVITIES: CPT | Mod: GP | Performed by: PHYSICAL THERAPIST

## 2022-06-30 ENCOUNTER — DOCUMENTATION ONLY (OUTPATIENT)
Dept: PLASTIC SURGERY | Facility: CLINIC | Age: 27
End: 2022-06-30

## 2022-07-01 NOTE — PROGRESS NOTES
RN Care Coordinator: Donald Hagan; [phone] and/or Kassandra Encinas; [phone]    Surgery is scheduled with Dr. Grant on 9/26 at the [facility]  Scheduled per next available    H&P to be completed by Primary Care Provider     Surgical consult:   9/6 with Dr. Grant    COVID-19 test:   patient to complete an at-home test 1-2 days prior to scheduled date and bring a picture of negative results or call [phone] option # 7 to schedule a PCR test within 4 days of the scheduled date     Post-op:   10/4 with Grecia Rodriguez PA-C  11/8 with Dr. Grant    Patient will receive a phone call from pre-admission nurses 1-2 days prior to surgery with arrival and start time.    Confirmed with patient via Interviewhart.    Surgery packet to be sent via US mail

## 2022-07-08 ENCOUNTER — TELEPHONE (OUTPATIENT)
Dept: DERMATOLOGY | Facility: CLINIC | Age: 27
End: 2022-07-08

## 2022-07-21 ENCOUNTER — OFFICE VISIT (OUTPATIENT)
Dept: RHEUMATOLOGY | Facility: CLINIC | Age: 27
End: 2022-07-21
Attending: INTERNAL MEDICINE
Payer: COMMERCIAL

## 2022-07-21 VITALS — DIASTOLIC BLOOD PRESSURE: 83 MMHG | SYSTOLIC BLOOD PRESSURE: 130 MMHG | HEART RATE: 93 BPM

## 2022-07-21 DIAGNOSIS — M24.9 HYPERMOBILE JOINTS: ICD-10-CM

## 2022-07-21 DIAGNOSIS — R74.8 ELEVATED CK: ICD-10-CM

## 2022-07-21 DIAGNOSIS — M25.50 CHRONIC PAIN OF MULTIPLE JOINTS: ICD-10-CM

## 2022-07-21 DIAGNOSIS — G89.29 CHRONIC PAIN OF MULTIPLE JOINTS: ICD-10-CM

## 2022-07-21 DIAGNOSIS — G89.4 CHRONIC PAIN SYNDROME: ICD-10-CM

## 2022-07-21 DIAGNOSIS — R76.8 POSITIVE ANA (ANTINUCLEAR ANTIBODY): ICD-10-CM

## 2022-07-21 DIAGNOSIS — G89.29 CHRONIC BILATERAL LOW BACK PAIN, UNSPECIFIED WHETHER SCIATICA PRESENT: ICD-10-CM

## 2022-07-21 DIAGNOSIS — M79.7 FIBROMYALGIA: Primary | ICD-10-CM

## 2022-07-21 DIAGNOSIS — M54.50 CHRONIC BILATERAL LOW BACK PAIN, UNSPECIFIED WHETHER SCIATICA PRESENT: ICD-10-CM

## 2022-07-21 LAB
BASOPHILS # BLD AUTO: 0.1 10E3/UL (ref 0–0.2)
BASOPHILS NFR BLD AUTO: 1 %
CRP SERPL-MCNC: <3 MG/L
EOSINOPHIL # BLD AUTO: 0.2 10E3/UL (ref 0–0.7)
EOSINOPHIL NFR BLD AUTO: 2 %
ERYTHROCYTE [DISTWIDTH] IN BLOOD BY AUTOMATED COUNT: 12.2 % (ref 10–15)
ERYTHROCYTE [SEDIMENTATION RATE] IN BLOOD BY WESTERGREN METHOD: 4 MM/HR (ref 0–20)
HCT VFR BLD AUTO: 49.3 % (ref 35–53)
HGB BLD-MCNC: 16.4 G/DL (ref 11.7–17.7)
IMM GRANULOCYTES # BLD: 0 10E3/UL
IMM GRANULOCYTES NFR BLD: 0 %
LYMPHOCYTES # BLD AUTO: 3 10E3/UL (ref 0.8–5.3)
LYMPHOCYTES NFR BLD AUTO: 36 %
MCH RBC QN AUTO: 27.7 PG (ref 26.5–33)
MCHC RBC AUTO-ENTMCNC: 33.3 G/DL (ref 31.5–36.5)
MCV RBC AUTO: 83 FL (ref 78–100)
MONOCYTES # BLD AUTO: 0.6 10E3/UL (ref 0–1.3)
MONOCYTES NFR BLD AUTO: 7 %
NEUTROPHILS # BLD AUTO: 4.4 10E3/UL (ref 1.6–8.3)
NEUTROPHILS NFR BLD AUTO: 54 %
PLATELET # BLD AUTO: 260 10E3/UL (ref 150–450)
RBC # BLD AUTO: 5.91 10E6/UL (ref 3.8–5.9)
URATE SERPL-MCNC: 4.8 MG/DL (ref 2.4–7)
WBC # BLD AUTO: 8.2 10E3/UL (ref 4–11)

## 2022-07-21 PROCEDURE — 86200 CCP ANTIBODY: CPT | Performed by: INTERNAL MEDICINE

## 2022-07-21 PROCEDURE — 99000 SPECIMEN HANDLING OFFICE-LAB: CPT | Performed by: INTERNAL MEDICINE

## 2022-07-21 PROCEDURE — 86036 ANCA SCREEN EACH ANTIBODY: CPT | Performed by: INTERNAL MEDICINE

## 2022-07-21 PROCEDURE — 85613 RUSSELL VIPER VENOM DILUTED: CPT | Performed by: INTERNAL MEDICINE

## 2022-07-21 PROCEDURE — 86256 FLUORESCENT ANTIBODY TITER: CPT | Performed by: INTERNAL MEDICINE

## 2022-07-21 PROCEDURE — 85025 COMPLETE CBC W/AUTO DIFF WBC: CPT | Performed by: INTERNAL MEDICINE

## 2022-07-21 PROCEDURE — 86140 C-REACTIVE PROTEIN: CPT | Performed by: INTERNAL MEDICINE

## 2022-07-21 PROCEDURE — 36415 COLL VENOUS BLD VENIPUNCTURE: CPT | Performed by: INTERNAL MEDICINE

## 2022-07-21 PROCEDURE — 86431 RHEUMATOID FACTOR QUANT: CPT | Performed by: INTERNAL MEDICINE

## 2022-07-21 PROCEDURE — 86235 NUCLEAR ANTIGEN ANTIBODY: CPT | Performed by: INTERNAL MEDICINE

## 2022-07-21 PROCEDURE — 84550 ASSAY OF BLOOD/URIC ACID: CPT | Performed by: INTERNAL MEDICINE

## 2022-07-21 PROCEDURE — 82164 ANGIOTENSIN I ENZYME TEST: CPT | Mod: 90 | Performed by: INTERNAL MEDICINE

## 2022-07-21 PROCEDURE — 99205 OFFICE O/P NEW HI 60 MIN: CPT | Performed by: INTERNAL MEDICINE

## 2022-07-21 PROCEDURE — 85730 THROMBOPLASTIN TIME PARTIAL: CPT | Performed by: INTERNAL MEDICINE

## 2022-07-21 PROCEDURE — 84156 ASSAY OF PROTEIN URINE: CPT | Performed by: INTERNAL MEDICINE

## 2022-07-21 PROCEDURE — 85652 RBC SED RATE AUTOMATED: CPT | Performed by: INTERNAL MEDICINE

## 2022-07-21 PROCEDURE — 86147 CARDIOLIPIN ANTIBODY EA IG: CPT | Performed by: INTERNAL MEDICINE

## 2022-07-21 PROCEDURE — 85390 FIBRINOLYSINS SCREEN I&R: CPT | Performed by: PATHOLOGY

## 2022-07-21 PROCEDURE — 86618 LYME DISEASE ANTIBODY: CPT | Performed by: INTERNAL MEDICINE

## 2022-07-21 PROCEDURE — 86225 DNA ANTIBODY NATIVE: CPT | Performed by: INTERNAL MEDICINE

## 2022-07-21 ASSESSMENT — PAIN SCALES - GENERAL: PAINLEVEL: SEVERE PAIN (6)

## 2022-07-21 NOTE — PROGRESS NOTES
Gregg Maharaj who presents today with a chief complaint of  No chief complaint on file.      Joint Pains: Yes  Location: multiple joints  Onset: months  Intensity: 6 /10  AM Stiffness: 60 Minutes  Alleviating/Aggravating Factors: meds and PT help. Stress increase pain Medications helpful?  Tolerating Meds: Yes  Other: UV sensitivity;       ROS:  Patient denies having: +persistent dry eyes,+ dry mouth,+ recurrent oral ulcers, patchy alopecia, + rashes, +photosensitivity, history of psoriasis, + chronic chest pain, + chronic shortness of breath, + chronic cough, + occasional dysuria, history of kidney stones, + occasional abdominal pain, +occasional diarrhea, history of hematochezia, active dysphagia, history of peptic ulcer disease, history of HIV, tuberculosis, hepatitis B or C, Lyme disease, +seizure history, +raynaud's,  fevers (occasional temperatures of ), +recent infections, +difficulty sleeping or chronic unrefreshing sleep, chronic involuntary weight loss, loss of appetite, +excessive fatigue, +depression, +anxiety,  +recurrent sinus infections, history of inflammatory eye diseases (such as uveitis, scleritis, iritis, etc).   .    Information gathered by medical assistant incorporated into this note, was reviewed and discussed with the patient.    Problem List:  Patient Active Problem List   Diagnosis     Acute UTI     VERNON positive     Bilateral leg pain     Dysmenorrhea     Elevated CK     Mild intermittent asthma without complication     Narcolepsy and cataplexy     Panic     Urinary urgency     Somatic dysfunction of pelvis region        PMH:   No past medical history on file.    Surgical History:  No past surgical history on file.    Family History:  No family history on file.    Social History:   reports that he has never smoked. He has never used smokeless tobacco.    Allergies:  Allergies   Allergen Reactions     Capsicum Annuum Extract & Derivative (Bell Pepper) [Capsicum] Anaphylaxis      Adhesive Tape      Other reaction(s): Atopic Dermatitis     Other Environmental Allergy Hives     Cotton seed oil     Metoclopramide Anxiety        Current Medications:  Current Outpatient Medications   Medication Sig Dispense Refill     albuterol (PROAIR HFA/PROVENTIL HFA/VENTOLIN HFA) 108 (90 Base) MCG/ACT inhaler Inhale 2 puffs into the lungs       cetirizine (ZYRTEC) 10 MG tablet Take 10 mg by mouth       clindamycin-benzoyl peroxide (BENZACLIN) 1-5 % external gel APPLY TO AFFECTED AREA TWICE A DAY IN THE MORNING AND IN THE EVENING (Patient not taking: No sig reported)       diazepam (VALIUM) 5 MG tablet VAGINAL VALIUM SUPPOSITORY 5MG AT NIGHT AND PRIOR TO THERAPY AS NEEDED 40 tablet 3     diphenhydrAMINE (BENADRYL) 25 MG capsule Take 25 mg by mouth       EPINEPHrine (ANY BX GENERIC EQUIV) 0.3 MG/0.3ML injection 2-pack Inject into the lateral thigh as needed for life threatening allergic reactions.       estradiol cypionate (DEPO-ESTRADIOL) 5 MG/ML injection Inject into the muscle every 28 days       fesoterodine fumarate (TOVIAZ) 4 MG TB24 24 hr tablet Take 1 tablet (4 mg) by mouth daily 30 tablet 3     gabapentin (NEURONTIN) 300 MG capsule Take 300-600 mg by mouth       ibuprofen (ADVIL/MOTRIN) 200 MG capsule        ketoconazole (NIZORAL) 2 % external shampoo Apply topically every other day To wet scalp and let sit x 3-5 120 mL 3     loratadine (CLARITIN) 10 MG tablet Take 10 mg by mouth       Methylphenidate HCl (CONCERTA PO) Take 36 mg by mouth daily       Methylphenidate HCl (RITALIN PO) Take 10 mg by mouth 5 times daily       montelukast (SINGULAIR) 10 MG tablet Take 10 mg by mouth daily       OTHER MEDICAL SUPPLIES        propranolol SR BEADS (INDREAL XL) 120 MG 24 hr capsule Take 120 mg by mouth       Specialty Vitamins Products (VITAMINS FOR HAIR) CAPS Take 1 tablet by mouth daily       SUMAtriptan (IMITREX) 50 MG tablet Take 50 mg by mouth       Testosterone 20.25 MG/1.25GM (1.62%) GEL         triamcinolone (KENALOG) 0.1 % external ointment Apply topically 2 times daily To rashes on the tops of the hands until clear. Use moisturizer on top and through out the day. 80 g 1     venlafaxine (EFFEXOR-ER) 150 MG TB24 24 hr tablet Take 150 mg by mouth daily (with breakfast)             Physical Exam:  There were no vitals taken for this visit.  General: A & O x 3 in NAD, overweight  HEENT: EOMI, Non injected/non icteric sclera, no oral lesions noted  Neck: Supple, no cervical LAD or thyromegaly noted  Derm: No malar rash, psoriatic lesions or nail pitting appreciated,  acne-like lesions noted on forehead.  No other rashes appreciated on exam today.  CV: s1s2 with RRR, no rubs appreciated   Lungs: CTA B/L, no wheezing , rales or rhonci appreciated  GI: Soft, NT/ND, no rebound, no guarding noted, no hepatomegally appreciated  MS: Ambulates with a cane.  Diffuse tenderness on palpating hand joints with no clear signs of synovitis noted.  Multiple rings noted involving multiple hand digits/joints.  Mild discomfort involving the shoulder passive range of motion testing with no limitation noted.  Positive discomfort involving upper lateral hips on passive range of motion testing bilaterally.  Passive flexion/extension of knees did not reproduce any pains, no clear signs of synovitis noted.  Had some reproducible knee pains on straight leg raising bilaterally.  16 /18 exquisitely tender myofascial tender points.  Otherwise patient demonstrated good passive/active ROM over other joints with no warmth, erythema, tenderness or synovitis noted over these joints.  Back: negative straight leg raising bilaterally, good C-spine range of motion testing.  Neuro: 5/5 strength in upper and lower extremities b/l, good sensation b/l,  2+ bicep and patella reflexes b/l      Summary/Assessment:    Pleasant 27-year-old patient presents with history of positive VERNON and chronic multiple joint pains and muscle aches.    Patient presents  today with partner.    States has been experiencing chronic joint pains since seventh grade with some worsening of knee pains since August 2021.    Joint pains involved since childhood include knees, hips, shoulders, elbows and hands.    Has history of hypermobile joints, has multiple rings involving multiple hand digits/joints, states to prevent subluxations and minimize pains.  States provided by OT.    Currently seeing PT, working on his knees and also hypermobile joints, providing strengthening exercises.  Also working on his deconditioning.    Patient ambulates with a cane.    Occasionally has low back pains, has not seen spine clinic.    Also seeing PT for pelvic floor dysfunction/interstitial cystitis.  States no longer experiencing menstrual periods.    Has seen a few rheumatologist in the past.  Last saw Dr. Crow February 2022 for polyarthralgias and positive VERNON, was diagnosed with chronic pain without sufficient evidence towards meeting criteria for having active connective tissue disease.    States his mother has RA and grandmother was diagnosed with lupus.    For pain relief takes Tylenol.    Admits to having chronic nonrestorative sleep with chronic fatigue.  Takes baclofen and gabapentin at night which helps him sleep better.    Not established with pain clinic.    Has not received cortisone injections.    States tried trial of oral steroids which benefited his joint pains.    On exam today, no clear signs of synovitis noted.  Has 16/18 exquisitely tender myofascial tender points.    Given the above, difficult to tell with certainty at this time as what the primary source is, contributing symptoms described.  Appears to have component of myofascial pain/fibromyalgia contributing to some of his diffuse arthralgias/myalgias, given chronic nonrestorative sleep, chronic fatigue, history anxiety/depression, lack of clear signs of synovitis on exam and greater than 11/18 myofascial tender points however,  this is a diagnosis of exclusion sometimes may just be secondary/superimposed condition.  ANAs are nonspecific and often there are false positives, will obtain some additional lab markers today along with x-rays and correlate clinically to screen for other possible underlying etiologies.    Please see below for management plan.      Pertinent rheumatology/past medical history (please refer to above for more detailed history):      Positive VERNON (1-320 titer with dense fine speckled pattern)    Fibromyalgia    Chronic multiple joint pains    Hypermobile joints    Chronic nonrestorative sleep with chronic fatigue    Overweight    Chronic low back pain, occasionally radiating bilaterally    History of nonepileptic seizures    Raynaud's-like symptoms    Subjective oral ulcers    History of asthma, mild    Rheumatology medications provided/suggested:    Diclofenac gel      Pertinent medication from other providers or from otc (please refer to above for more detailed med list):    Gabapentin  Baclofen  Effexor   Tylenol  Ibuprofen        Pertinent medications already tried:           Pertinent lab history:    Positive/elevated: VERNON, urine protein    Negative/unremarkable: SSA/SSB antibodies, SCL-70 antibody, Smith antibody, RNP antibody, C3/C4, CK, TSH, ferritin, urine microalbumin      Pertinent imaging/test history:    MRI Lumbar Spine  IMPRESSION:   Unremarkable MRI of the lumbar spine    MRI Thoracic Spine  IMPRESSION:   Unremarkable MRI of the thoracic spine.        Other:    Marital status:  single     How many kids:  No     Type of work:  No. 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    For arthralgias/myalgias patient can take Tylenol 500-1000 mg once or twice a day, on worse days can take 3 times a day.    For pain not responsive to Tylenol can take ibuprofen/400-800 milligrams, 2-3 times a day as needed.    For isolated joint pains as an alternative to ibuprofen can utilize diclofenac gel 1-4 times a day as needed.    Will refer patient to spine clinic for chronic low back pains.    Will obtain x-rays of: Bilateral hips/pelvis, knees and hands.    We will obtain some labs and correlate clinically.    Given positive VERNON we will continue to monitor for any signs and symptoms consistent with having an associated connective tissue disease and manage accordingly.    Follow-up in 3 months.      Procedure note:     Total time, spent 70 minutes involved with patient care, includes placing orders, reviewing records and formulating management plan.    Major side effect profile of medications provided/suggested were discussed with the patient.    This note was transcribed using Dragon voice recognition software as a result unintentional grammatical errors or word substitutions may have occurred. Please contact our Rheumatology department if you need any clarification or if you have any related inquiries.    Thank you for referring this patient to our clinic.      Shashi Gudino DO    ....................  7/21/2022   3:21 PM

## 2022-07-21 NOTE — PATIENT INSTRUCTIONS
Summary of Your Rheumatology Visit    Next Appointment:   3 Months    Medications:    Can also try over-the-counter diclofenac gel for hand and/or knee pains, can apply up to 1-4 times a day, on a when necessary basis.  Please also follow directives on Diclofenac tube on how to utilize.     Referrals:    Spine clinic    Tests:     Please have labs and x-rays that were ordered performed.       Injections:      Other:

## 2022-07-22 LAB
ALBUMIN MFR UR ELPH: 16.4 MG/DL
ANCA AB PATTERN SER IF-IMP: NORMAL
B BURGDOR IGG+IGM SER QL: 0.09
C-ANCA TITR SER IF: NORMAL {TITER}
CARDIOLIPIN IGA SER IA-ACNC: 2.6 APL-U/ML
CARDIOLIPIN IGA SER IA-ACNC: NEGATIVE
CARDIOLIPIN IGG SER IA-ACNC: <2 GPL-U/ML
CARDIOLIPIN IGG SER IA-ACNC: NEGATIVE
CARDIOLIPIN IGM SER IA-ACNC: <2 MPL-U/ML
CARDIOLIPIN IGM SER IA-ACNC: NEGATIVE
CCP AB SER IA-ACNC: 1.7 U/ML
CREAT UR-MCNC: 239 MG/DL
DRVVT SCREEN RATIO: 1.07
DSDNA AB SER-ACNC: <0.6 IU/ML
INR PPP: 0.99 (ref 0.85–1.15)
LA PPP-IMP: NEGATIVE
LUPUS INTERPRETATION: NORMAL
PROT/CREAT 24H UR: 0.07 MG/MG CR (ref 0–0.2)
PTT RATIO: 1.11
RHEUMATOID FACT SER NEPH-ACNC: <6 IU/ML
THROMBIN TIME: 18.1 SECONDS (ref 13–19)
U1 SNRNP IGG SER IA-ACNC: <1.1 U/ML
U1 SNRNP IGG SER IA-ACNC: NEGATIVE

## 2022-07-24 ENCOUNTER — TELEPHONE (OUTPATIENT)
Dept: RHEUMATOLOGY | Facility: CLINIC | Age: 27
End: 2022-07-24

## 2022-07-24 LAB — ACE SERPL-CCNC: 81 U/L

## 2022-07-24 NOTE — TELEPHONE ENCOUNTER
Reason for Call:  Other     Detailed comments:   PT WANTS TO MAKE SURE PRESCRIPTION DID NOT INTERFERE WITH LABS    Phone Number Patient can be reached at: Home number on file [phone] (home)    Best Time: ANY    Can we leave a detailed message on this number? YES    Call taken on 7/24/2022 at 2:03 PM by Tali Osborne

## 2022-07-25 ENCOUNTER — ANCILLARY PROCEDURE (OUTPATIENT)
Dept: GENERAL RADIOLOGY | Facility: CLINIC | Age: 27
End: 2022-07-25
Attending: INTERNAL MEDICINE
Payer: COMMERCIAL

## 2022-07-25 DIAGNOSIS — G89.29 CHRONIC PAIN OF MULTIPLE JOINTS: ICD-10-CM

## 2022-07-25 DIAGNOSIS — M25.50 CHRONIC PAIN OF MULTIPLE JOINTS: ICD-10-CM

## 2022-07-25 DIAGNOSIS — G89.4 CHRONIC PAIN SYNDROME: ICD-10-CM

## 2022-07-25 DIAGNOSIS — R76.8 POSITIVE ANA (ANTINUCLEAR ANTIBODY): ICD-10-CM

## 2022-07-25 PROCEDURE — 73522 X-RAY EXAM HIPS BI 3-4 VIEWS: CPT | Performed by: RADIOLOGY

## 2022-07-26 ENCOUNTER — OFFICE VISIT (OUTPATIENT)
Dept: UROLOGY | Facility: CLINIC | Age: 27
End: 2022-07-26
Payer: COMMERCIAL

## 2022-07-26 DIAGNOSIS — M99.05 SOMATIC DYSFUNCTION OF PELVIS REGION: Primary | ICD-10-CM

## 2022-07-26 PROCEDURE — 51798 US URINE CAPACITY MEASURE: CPT

## 2022-07-26 NOTE — PROGRESS NOTES
Chief Complaint   Patient presents with     Allied Health Visit     PVR       Patient Active Problem List   Diagnosis     Acute UTI     VERNON positive     Bilateral leg pain     Dysmenorrhea     Elevated CK     Mild intermittent asthma without complication     Narcolepsy and cataplexy     Panic     Urinary urgency     Somatic dysfunction of pelvis region       Allergies   Allergen Reactions     Capsaicin Anaphylaxis     Capsicum Annuum Extract & Derivative (Bell Pepper) [Capsicum] Anaphylaxis     No Clinical Screening - See Comments Anaphylaxis, Dermatitis, Hives, Itching, Rash and Shortness Of Breath     Cotton seed oil  Cotton seed oil     Piper Anaphylaxis, Hives, Itching and Shortness Of Breath     Red chili peppers (harissa paste)     Adhesive Tape      Other reaction(s): Atopic Dermatitis  Other reaction(s): Atopic Dermatitis  Other reaction(s): Atopic Dermatitis     Other Environmental Allergy Hives     Cotton seed oil     Metoclopramide Anxiety       Current Outpatient Medications   Medication Sig Dispense Refill     albuterol (PROAIR HFA/PROVENTIL HFA/VENTOLIN HFA) 108 (90 Base) MCG/ACT inhaler Inhale 2 puffs into the lungs       cetirizine (ZYRTEC) 10 MG tablet Take 10 mg by mouth       clindamycin-benzoyl peroxide (BENZACLIN) 1-5 % external gel APPLY TO AFFECTED AREA TWICE A DAY IN THE MORNING AND IN THE EVENING       diazepam (VALIUM) 5 MG tablet VAGINAL VALIUM SUPPOSITORY 5MG AT NIGHT AND PRIOR TO THERAPY AS NEEDED 40 tablet 3     diphenhydrAMINE (BENADRYL) 25 MG capsule Take 25 mg by mouth       EPINEPHrine (ANY BX GENERIC EQUIV) 0.3 MG/0.3ML injection 2-pack Inject into the lateral thigh as needed for life threatening allergic reactions.       estradiol cypionate (DEPO-ESTRADIOL) 5 MG/ML injection Inject into the muscle every 28 days       fesoterodine fumarate (TOVIAZ) 4 MG TB24 24 hr tablet Take 1 tablet (4 mg) by mouth daily 30 tablet 3     gabapentin (NEURONTIN) 300 MG capsule Take 300-600 mg by  mouth       ibuprofen (ADVIL/MOTRIN) 200 MG capsule        ketoconazole (NIZORAL) 2 % external shampoo Apply topically every other day To wet scalp and let sit x 3-5 120 mL 3     loratadine (CLARITIN) 10 MG tablet Take 10 mg by mouth       Methylphenidate HCl (CONCERTA PO) Take 36 mg by mouth daily       Methylphenidate HCl (RITALIN PO) Take 10 mg by mouth 5 times daily       montelukast (SINGULAIR) 10 MG tablet Take 10 mg by mouth daily       OTHER MEDICAL SUPPLIES        propranolol SR BEADS (INDREAL XL) 120 MG 24 hr capsule Take 120 mg by mouth       Specialty Vitamins Products (VITAMINS FOR HAIR) CAPS Take 1 tablet by mouth daily       SUMAtriptan (IMITREX) 50 MG tablet Take 50 mg by mouth       Testosterone 20.25 MG/1.25GM (1.62%) GEL        triamcinolone (KENALOG) 0.1 % external ointment Apply topically 2 times daily To rashes on the tops of the hands until clear. Use moisturizer on top and through out the day. 80 g 1     venlafaxine (EFFEXOR-ER) 150 MG TB24 24 hr tablet Take 150 mg by mouth daily (with breakfast)         Social History     Tobacco Use     Smoking status: Never Smoker     Smokeless tobacco: Never Used       Gregg Maharaj comes into clinic today at the request of Dr. Bowen for PVR.    Patient diagnosis: Somatic dysfunction of pelvis region    Residual Volume by Ultrasound: 6 ml    This service provided today was under the direct supervision of Dr. Stephens, who was available if needed.    Joel Mccord EMT  7/26/2022  1:05 PM
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
## 2022-08-03 ENCOUNTER — OFFICE VISIT (OUTPATIENT)
Dept: PHYSICAL MEDICINE AND REHAB | Facility: CLINIC | Age: 27
End: 2022-08-03
Payer: COMMERCIAL

## 2022-08-03 VITALS
OXYGEN SATURATION: 98 % | DIASTOLIC BLOOD PRESSURE: 76 MMHG | RESPIRATION RATE: 16 BRPM | SYSTOLIC BLOOD PRESSURE: 116 MMHG | HEART RATE: 60 BPM

## 2022-08-03 DIAGNOSIS — G89.4 CHRONIC PAIN SYNDROME: ICD-10-CM

## 2022-08-03 DIAGNOSIS — G89.0 CENTRAL PAIN SYNDROME: ICD-10-CM

## 2022-08-03 DIAGNOSIS — M62.830 BACK MUSCLE SPASM: ICD-10-CM

## 2022-08-03 DIAGNOSIS — M79.7 FIBROMYALGIA: Primary | ICD-10-CM

## 2022-08-03 DIAGNOSIS — M54.9 SPINE PAIN, MULTILEVEL: ICD-10-CM

## 2022-08-03 DIAGNOSIS — R53.82 CHRONIC FATIGUE: ICD-10-CM

## 2022-08-03 PROCEDURE — 99205 OFFICE O/P NEW HI 60 MIN: CPT | Performed by: PHYSICAL MEDICINE & REHABILITATION

## 2022-08-03 RX ORDER — TESTOSTERONE 1.62 MG/G
2 GEL TRANSDERMAL EVERY MORNING
COMMUNITY
Start: 2022-05-31

## 2022-08-03 RX ORDER — PREDNISONE 10 MG/1
TABLET ORAL
COMMUNITY
Start: 2022-05-31 | End: 2022-09-12

## 2022-08-03 RX ORDER — METHYLPHENIDATE HYDROCHLORIDE 54 MG/1
36 TABLET, EXTENDED RELEASE ORAL DAILY
COMMUNITY
Start: 2022-07-24 | End: 2023-02-06

## 2022-08-03 RX ORDER — OXYBUTYNIN CHLORIDE 5 MG/1
TABLET, EXTENDED RELEASE ORAL
COMMUNITY
Start: 2022-07-23 | End: 2022-08-22

## 2022-08-03 RX ORDER — AZELASTINE 1 MG/ML
SPRAY, METERED NASAL
COMMUNITY
Start: 2022-05-11

## 2022-08-03 RX ORDER — BACLOFEN 10 MG/1
TABLET ORAL
COMMUNITY
Start: 2022-07-11

## 2022-08-03 RX ORDER — FAMOTIDINE 20 MG/1
20 TABLET, FILM COATED ORAL 2 TIMES DAILY
COMMUNITY
Start: 2022-05-12 | End: 2024-08-14

## 2022-08-03 RX ORDER — PROPRANOLOL HYDROCHLORIDE 160 MG/1
160 CAPSULE, EXTENDED RELEASE ORAL AT BEDTIME
COMMUNITY
Start: 2022-07-04

## 2022-08-03 ASSESSMENT — PAIN SCALES - GENERAL: PAINLEVEL: MODERATE PAIN (5)
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
## 2022-08-09 ENCOUNTER — TELEPHONE (OUTPATIENT)
Dept: PHYSICAL MEDICINE AND REHAB | Facility: CLINIC | Age: 27
End: 2022-08-09

## 2022-08-09 NOTE — TELEPHONE ENCOUNTER
I called [facility] to confirm company, phone ([phone]), and fax.([phone]) and have faxed over pain referral.

## 2022-08-20 DIAGNOSIS — N32.81 URGENCY-FREQUENCY SYNDROME: Primary | ICD-10-CM

## 2022-08-22 ENCOUNTER — DOCUMENTATION ONLY (OUTPATIENT)
Dept: PLASTIC SURGERY | Facility: CLINIC | Age: 27
End: 2022-08-22

## 2022-08-22 DIAGNOSIS — F64.0 GENDER DYSPHORIA IN ADOLESCENT AND ADULT: Primary | ICD-10-CM

## 2022-08-22 NOTE — PROGRESS NOTES
[facility] :  Care Coordination Note     SITUATION   Gregg Maharaj is a 27 year old adult who is receiving support for: gender dysphoria.    BACKGROUND     Gregg saw Dr Grant for gender affirming top surgery consultation on 3/22/22. Pt has surgery scheduled for 9/26/22.    ASSESSMENT     - Pt has LOS and diagnostic assessment in chart, media tab 4/1/22. Reviewed by   - Pt had mammogram (negative), in chart, care everywhere 4/12/22  -Pt does not have orders for surgery  - Pt needs pre-op assessment by PAC, per Dr Grant's note 3/22/22    PLAN       Follow-up plan:    - Request case request from Dr Grant  - Referral to PAC, per Dr Grant consultation note  - Pt to schedule PAC for pre-op H&P  - Called pt to inform of need to schedule pre-op with PAC. Unable to reach, left voicemail with PAC scheduling information and callback number.        Jun Vázquez, RN

## 2022-08-23 ENCOUNTER — DOCUMENTATION ONLY (OUTPATIENT)
Dept: PLASTIC SURGERY | Facility: CLINIC | Age: 27
End: 2022-08-23

## 2022-08-23 NOTE — PROGRESS NOTES
Pt has seen Dr Grant for top surgery consult. Requested case request from Dr Grant.    Jun ECHEVARRIA RN

## 2022-08-24 PROBLEM — M99.05 SOMATIC DYSFUNCTION OF PELVIS REGION: Status: RESOLVED | Noted: 2022-04-01 | Resolved: 2022-08-24

## 2022-08-24 NOTE — PROGRESS NOTES
Subjective:  HPI  Physical Exam                    Objective:  System    Physical Exam    General     ROS    Assessment/Plan:    DISCHARGE REPORT    Progress reporting period is from 6/15/2022.       SUBJECTIVE  Subjective changes noted by patient:  .  Subjective: Using the chair for all the pain.  Took a course of prednisone.  Using cane intermittently.  Bladder symptoms were gone on prednisone.  Had to stop bladder medication. Did not feel the urge.  Has valium suppositories but has not tried these. Having trouble with initiating a stream.  Stool softeners have helped.Got a tool to help with pelvic pain.  Bath has helped a lot.  Being referred to a specialist to determine if EDS is adding to the issues.    Current pain level is NA  .     Previous pain level was  NA  .   Changes in function:  Yes (See Goal flowsheet attached for changes in current functional level)  Adverse reaction to treatment or activity: None    OBJECTIVE  Changes noted in objective findings:  Yes,   Objective: Forgot bladder diary.  Was voiding 200 ml and one 300 ml in the morning.  Trying double voiding.  Seeing a rheumatologist with potential diagnosis of Lupus. Increased tone in the pelvic floor noted.  With cues for pelvic muscle contraction feel spasm and weak contraction with difficulty relaxing.  Focus on relaxation and stretching for now.  Instructed in external massage as well and abdominal massage for colon motility.     ASSESSMENT/PLAN  Updated problem list and treatment plan: Diagnosis 1:  pelvic pain  Pain -  manual therapy, self management, education and home program  Decreased ROM/flexibility - manual therapy, therapeutic exercise and home program  Decreased joint mobility - manual therapy, therapeutic exercise and home program  Decreased strength - therapeutic exercise, therapeutic activities and home program  Impaired muscle performance - neuro re-education  Decreased function - therapeutic activities and home  program  STG/LTGs have been met or progress has been made towards goals:  Yes (See Goal flow sheet completed today.)  Assessment of Progress: The patient has had set backs in their progress.  The patient has not returned to therapy. Current status is unknown.  Self Management Plans:  Patient has been instructed in a home treatment program.    Gregg continues to require the following intervention to meet STG and LTG's:  Patient needs to continue to work on the home exercise program.      Recommendations:  Will discharge since patient was undergoing further evaluation.    Please refer to the daily flowsheet for treatment today, total treatment time and time spent performing 1:1 timed codes.

## 2022-08-24 NOTE — TELEPHONE ENCOUNTER
oxybutynin ER (DITROPAN XL) 5 MG 24 hr tablet      Last Written Prescription Date:  6/1/2022  Last Fill Quantity: 30,   # refills: 1  Last Office Visit : 4/28/2022 [facility]  Future Office visit:  none    Routing refill request to provider for review/approval because:  Drug not active on patient's medication list  - last ordered 6/1/2022 Disp:30 R:1

## 2022-08-25 RX ORDER — OXYBUTYNIN CHLORIDE 5 MG/1
5 TABLET, EXTENDED RELEASE ORAL DAILY
Qty: 90 TABLET | Refills: 2 | Status: SHIPPED | OUTPATIENT
Start: 2022-08-25

## 2022-08-29 NOTE — TELEPHONE ENCOUNTER
FUTURE VISIT INFORMATION      SURGERY INFORMATION:    Date: 22    Location: ur or    Surgeon:  Anita Grant MD    Anesthesia Type:  General    Procedure: MASTECTOMY, BILATERAL, SIMPLE, possible nipple grafts. OnQ    RECORDS REQUESTED FROM:       Primary Care Provider: Jessica Cardoso MD- [facility]    Most recent EKG+ Tracin21- [facility]

## 2022-09-01 ENCOUNTER — PRE VISIT (OUTPATIENT)
Dept: DERMATOLOGY | Facility: CLINIC | Age: 27
End: 2022-09-01

## 2022-09-06 ENCOUNTER — OFFICE VISIT (OUTPATIENT)
Dept: PLASTIC SURGERY | Facility: CLINIC | Age: 27
End: 2022-09-06
Payer: COMMERCIAL

## 2022-09-06 VITALS
HEART RATE: 67 BPM | SYSTOLIC BLOOD PRESSURE: 118 MMHG | DIASTOLIC BLOOD PRESSURE: 82 MMHG | HEIGHT: 65 IN | TEMPERATURE: 99.2 F | WEIGHT: 225 LBS | OXYGEN SATURATION: 98 % | BODY MASS INDEX: 37.49 KG/M2

## 2022-09-06 DIAGNOSIS — G43.909 MIGRAINE WITHOUT STATUS MIGRAINOSUS, NOT INTRACTABLE, UNSPECIFIED MIGRAINE TYPE: ICD-10-CM

## 2022-09-06 DIAGNOSIS — F64.0 GENDER DYSPHORIA IN ADOLESCENT AND ADULT: Primary | ICD-10-CM

## 2022-09-06 PROCEDURE — 99215 OFFICE O/P EST HI 40 MIN: CPT | Performed by: SURGERY

## 2022-09-06 RX ORDER — BUPROPION HYDROCHLORIDE 150 MG/1
150 TABLET ORAL EVERY MORNING
Status: ON HOLD | COMMUNITY
Start: 2022-08-31 | End: 2022-09-26

## 2022-09-06 ASSESSMENT — PAIN SCALES - GENERAL: PAINLEVEL: MILD PAIN (2)

## 2022-09-06 NOTE — PROGRESS NOTES
"PLASTICS PRE-OP  This is a 27 year old biological female transitioning to male who presents for his pre-op visit with his partner, Yuan, prior to bilateral simple mastectomy with nipple graft reconstruction scheduled for 9/26/2022. The patient has had a negative mammogram, and his letter of support from  Arlet Cabrera at [facility] has been received. History and physical were received. COVID test is scheduled for at home.  Will also do PAC visit to alert anesthesia to any possible surgical issues given complex medical history.     Gregg was recently hospitalized for 4 days for a migraine aura that did not respond to migraine treatments. This was due to his psychogenic non-epileptic seizures. He also notes that as these are non-psychogenic it is \"literally impossible to have a seizure unless I'm conscious.\" He reports brain fog due to fibromyalgia flares and hypersomnia, not to his recent concussion. His concussion was caused by standing up too quickly and hitting his head on the ceiling.      Our LPN, Cassandra, discussed periop instructions with the patient including: not eating anything 8 hours prior to surgery, drinking clear liquids up to 2 hours before surgery except for morning medications with a sip of water, the preop shower with surgical soap which was given, and wearing a button- or zip-up shirt on the day of surgery. The patient was also instructed to avoid NSAIDs x 1 week both before AND after surgery, but he may take Tylenol post-op for pain as needed. The patient was provided with a folder of information on kp-op topics, including where the surgery will be.     I discussed the following with the patient; preop, intraop and postop phases of care on the day of surgery, the placement of a bladder catheter during surgery that will likely be removed in recovery, postop cares and limitations with relation to home and work settings, 5-lb weight restriction for the first 3 weeks postop, and how long to " "maintain limited activities. We also discussed Zofran, oxycodone, Z-jamar, and antipruritics which will be prescribed. We discussed that preventing constipation will be their responsibility, and we discussed methods such as aloe, prune juice or Miralax.     In addition, we went over the possible risks and complications involved with this elective procedure. These include but are not limited to: infection, bleeding, hematoma/seroma formation, and poor healing (including dehiscence, nipple graft loss, or hypertrophic scarring). We also discussed the possibility of altered chest sensation (either hypo or hypersensitive), residual deformities and asymmetries, possible further surgical revisions, and possible injury to surrounding neurovascular and musculoskeletal structures, including intra-axillary or intra-thoracic. We lastly discussed anesthetic risks including DVT/PE or cardiopulmonary events.      All questions were answered. Gregg will bring any other questions that arise to the day of surgery. Given their recent migraine and cyst found in their sinuses, they just wanted to ensure they will still be able to follow through with surgery. They were going to do an EEG on 8/26/2022 following a tremor episode, for which he was given tylenol. No epileptic activity was found. He was seen by a different neurologist rather than his normal neurologist. He has also contacted a DBT program and has been put on Welbutrin to assist his Effexor for ADHD and hypersomnia. He would like to continue on these stimulants because they \"help [him] function.\" He notes he also has hypoglycemia, and is given permission to do clear liquids before surgery in order to keep his blood sugar at a reasonable level. Also had vocal cord cysts removed under GA with no problems.     NOTE: may need to give additional pain meds postop given medical history and KACY use, as well as h/o FM flares.  Consider using LATEX FREE protocol.    Total time = 45 " minutes, spent on the date of encounter doing chart review, history and physical, dressing changes, documentation, patient education, and any further activity as noted above. He questions latex allergy (as grandmother has a reaction to latex).    This note was prepared on behalf of Anita Grant MD by Jennifer Dominguez, a trained medical scribe, based on my observations and the provider's statements to me.

## 2022-09-06 NOTE — PATIENT INSTRUCTIONS
Bilateral Mastectomy   Pre and Post op Surgery Instructions    You are scheduled for top surgery with nipple grafts on 9/26/22 at 7:30 AM    You will need to arrive at 5:30 [place] [address]. You can park in the Green [address] and check in on 3rd floor. (See attached map).     - Please make sure you have someone to drive you home after your surgery and you will need someone to stay with you at home 24 hours after your surgery.    The surgery will be about 3-4 hours long. You will be in recovery afterwards for about 1-2 hours.     Before Surgery:  - 8 hours prior to surgery stop eating solid food. You can continue to drink clear liquids (water, apple juice, Gatorade) up to 2 hours before surgery. If you have any medications that need to be taken in this timeframe, you can take them with a small sip of water    - No Ibuprofen, Advil, Aleve, Naproxen, Motrin, Aspirin for 7 days prior to surgery. If you are having pain in the week before surgery Tylenol is okay to take.    - Wear or bring a button up or zip up shirt with you to the hospital.    - Wash with surgical soap:    Showering with an antiseptic soap prior to an invasive procedure will decrease the  bacteria that is normally found on the skin.   Using 1/2 of the bottle for each application.  Be sure to use the whole bottle before coming to surgery.  The evening before surgery, shower or bathe as you normally would, using your preferred soap.  Give special attention to areas where the incisions will be made. Rinse thoroughly.  You may wash your hair with your regular shampoo.   Next wash your entire body, from the chin down, with the special antiseptic soap (full strength) using a freshly laundered clean washcloth, again giving special attention to areas where incisions will be made.  Rinse thoroughly and dry off using a freshly laundered clean towel.   Wear clean clothes/pajamas and sleep on  clean sheets  Repeat steps 2 and 3 in the morning before coming to surgery (using the rest of the bottle of soap).     **If you have a reaction to the surgical soap, let the nurse know.     Events of day:     -Check in on the 3rd floor of the hospital for your surgery.    Pre-op     - After you check in, you will meet with a pre-op nurse. They will go over your medications, review your H&P (pre-op physical), have you change into a paper gown, and your chest will be wiped again with soap.    - They will place compression boots on both legs to help decrease risk of blood clots.     - You may be asked to complete a pregnancy test. If you have had no exposure to sperm, you can decline.    - You will meet with the anesthesiologists and nurse anesthetist who will be keeping you asleep during surgery, they will be placing an IV, and will be monitoring you throughout the surgery.    - You will meet with the RN as you are being moved into operating room, they will be helping to position you and keep you comfortable during the surgery.     - Dr. Grant will meet you in the pre-op area to discuss any questions about surgery, make markings on your chest for planning, and to sign your consent.     Intra-op (OR)    - A catheter will be placed in your bladder after you are sleeping and is typically removed before you wake up. The longest this would typically be in is in the post-op recovery room if needed.     - There will be straps and pillows to help position you and keep you in place during the surgery.    - The tissue that is removed will be sent to pathology to be analyzed and then discarded.     - FELIPE drains will be placed (one in each armpit) and a pain catheter will be placed in the center of your chest.     - Closure is done with dissolvable sutures under the skin and is then sealed with glue, and tape.    Post-op/Recovery    - You can usually go home the same day so long as you are eating, drinking, voiding, and pain is  "well controlled.  You will be sent home with all needed supplies.     - Post-Op medications you will be given in addition to the anesthesia include: Zofran (nausea), Oxycodone (pain), Z-jamar (infection), and antipruritic (itching).     - You will leave the hospitals with an ace bandage wrapped around your chest for compression. You may keep the ace bandage on until you come back for your one week post op visit or you may adjust the wrap as needed if it is either too tight or too loose.     Post-Op Cares:     - No lifting over 5 lbs for 3 weeks after surgery    - No stretching arms out or above the head (\"modified T-oscar\")    - Walk around frequently to help prevent blood clots    - Do deep breathing exercises to prevent pneumonia    - No sleeping on stomach x 3 weeks after surgery, if it is difficult not to roll over onto your stomach you can sleep in a recliner or use additional pillows    - Do not use any ice packs or heat packs    - Preventing constipation will be your responsibility. You can use whatever method works best for you such as; aloe, prune juice, Sennokot or Miralax.    No showering in the first week after your surgery.     What to expect at your 1st post op visit:    When you come in for your 1st post op visit, we will assess the output of your drains and remove the pain catheter (On-Q) that was placed on your chest.     -Please remember to bring the documentations of your output with you to your appointment so we can review how much your drains have been putting out since your surgery.     -We will remove the bolsters that was placed on your nipples and teach you how to perform nipple graft dressings.     -You will be given supplies to take home and will need to continue wearing the ace wrap compression for another week after the drains are removed.     Nipple Care:   Change nipple dressings daily for 1 week. Shower with nipple dressings in place and change the dressing afterward. No direct shower " spray on nipple grafts for 4 weeks.     Cut vaseline gauze to nipple size and place over nipple. Place folded white gauze over vaseline gauze and cover with plastic dressing. Do dressing changes for 1 week. If you continue to have drainage, continue the dressings until drainage stops.       Drain Care:  You will be discharged with Hilton-Kat (FELIPE) drainage tubes. These tubes drain fluid from your incision, helping to prevent swelling and reducing the risk for infection.  The tube is held in place by a few stitches. Pin your FELIPE drain to your clothing by using a safety pin through the plastic loop on the top of the bulb. If the drain is not attached to your clothing, it may pull out from under your skin. Drains are uncomfortable!    Emptying the drain bulb: The measuring cup provided by the hospital or a measuring cup that is used only for the FELIPE drain.  Wash your hands with soap and water.  Hold the bulb securely.  Remove the drainage plug from the emptying port.  Carefully turn the bulb upside down over the measuring cup, and gently squeeze all of the drainage into the measuring cup.  Squeeze the middle of the bulb firmly so that the bulb is as flat as possible.                                                                                                                                                    While still squeezing the bulb, replace the drainage plug. This step is important to keep the drain suction  Measure how much fluid you removed from the bulb.  Write down the amount and color of the fluid you removed from the bulb. If  you have more than one drain, keep a separate record for each one.  Empty the fluid into the toilet and flush.  Rinse the measuring cup, and wash your hands with soap and water.  You should empty the drain at least two times each day, in the morning and at bedtime.  Drainage tubes are removed when the output is less than 20ml in a 24 hour period for 2 consecutive days.  Output  will be somewhere between 30 to 50 mLs per day, but don't be alarmed if it is more or less. Output may not be equal between sides. Amounts will probably decrease over time.  The color coming from the drains may vary from deep red, light pink, or clear yellow.    Stripping the tube:  To prevent clots from blocking the drain, you will need to  strip  it. Stripping means that you use your fingers to squeeze along the length of the drain to help maintain the flow of drainage.  Wash your hands.  Using one hand, firmly hold the tubing near the insertion site (as close to your skin as the ace wrap and gauze dressing will allow). This will prevent the drain from being pulled out while you are stripping it and from pulling on skin and sutures.  [place] upper/top fingers and milk with the bottom or lower fingers.  Using your index finger and thumb of the other hand, squeeze the tubing below the  first hand. You should squeeze it firmly enough so the tubing becomes flat.   Maintain pressure on the tube, as you are squeezing, slide your index finger and thumb down the tube about 4-6 inches toward the bulb. (use alcohol wipes or lotion to help).  Then, release the hand closest to where the tube is attached to the body (making sure to keep pressure with the other hand), and move upper/anchor hand down. Squeeze, Repeat in segments.  Do not release the pressure you are creating in the tubing until you reach the bulb.  The whole tube will be flat.   If at any time it feels like the tube is pulling away from the skin or starts to hurt STOP! Then start over again more gently.   Strip the drain each time you empty it.

## 2022-09-06 NOTE — NURSING NOTE
"Chief Complaint   Patient presents with     RANCHO Escobedo, is being sen today for a pre-op DOS 9/26/22.       Vitals:    09/06/22 0957   BP: 118/82   BP Location: Left arm   Patient Position: Chair   Cuff Size: Adult Large   Pulse: 67   Temp: 99.2  F (37.3  C)   TempSrc: Oral   SpO2: 98%   Weight: 102.1 kg (225 lb)   Height: 1.651 m (5' 5\")       Body mass index is 37.44 kg/m .      Cassandra Zamora LPN    "

## 2022-09-06 NOTE — LETTER
"9/6/2022       RE: Gregg Maharaj  [address] [place] [address]  [address]  [place] [address]     Dear Colleague,    Thank you for referring your patient, Gregg Maharaj, to the [facility] [place] [facility]. Please see a copy of my visit note below.    PLASTICS PRE-OP  This is a 27 year old biological female transitioning to male who presents for his pre-op visit with his partner, Yuna, prior to bilateral simple mastectomy with nipple graft reconstruction scheduled for 9/26/2022. The patient has had a negative mammogram, and his letter of support from  Arlet Cabrera at [facility] has been received. History and physical were received. COVID test is scheduled for at home.  Will also do PAC visit to alert anesthesia to any possible surgical issues given complex medical history.     Gregg was recently hospitalized for 4 days for a migraine aura that did not respond to migraine treatments. This was due to his psychogenic non-epileptic seizures. He also notes that as these are non-psychogenic it is \"literally impossible to have a seizure unless I'm conscious.\" He reports brain fog due to fibromyalgia flares and hypersomnia, not to his recent concussion. His concussion was caused by standing up too quickly and hitting his head on the ceiling.      Our LPN, Cassandra, discussed periop instructions with the patient including: not eating anything 8 hours prior to surgery, drinking clear liquids up to 2 hours before surgery except for morning medications with a sip of water, the preop shower with surgical soap which was given, and wearing a button- or zip-up shirt on the day of surgery. The patient was also instructed to avoid NSAIDs x 1 week both before AND after surgery, but he may take Tylenol post-op for pain as needed. The patient was provided with a folder of information on kp-op topics, including where the surgery will be. " "    I discussed the following with the patient; preop, intraop and postop phases of care on the day of surgery, the placement of a bladder catheter during surgery that will likely be removed in recovery, postop cares and limitations with relation to home and work settings, 5-lb weight restriction for the first 3 weeks postop, and how long to maintain limited activities. We also discussed Zofran, oxycodone, Z-jamar, and antipruritics which will be prescribed. We discussed that preventing constipation will be their responsibility, and we discussed methods such as aloe, prune juice or Miralax.     In addition, we went over the possible risks and complications involved with this elective procedure. These include but are not limited to: infection, bleeding, hematoma/seroma formation, and poor healing (including dehiscence, nipple graft loss, or hypertrophic scarring). We also discussed the possibility of altered chest sensation (either hypo or hypersensitive), residual deformities and asymmetries, possible further surgical revisions, and possible injury to surrounding neurovascular and musculoskeletal structures, including intra-axillary or intra-thoracic. We lastly discussed anesthetic risks including DVT/PE or cardiopulmonary events.      All questions were answered. Gregg will bring any other questions that arise to the day of surgery. Given their recent migraine and cyst found in their sinuses, they just wanted to ensure they will still be able to follow through with surgery. They were going to do an EEG on 8/26/2022 following a tremor episode, for which he was given tylenol. No epileptic activity was found. He was seen by a different neurologist rather than his normal neurologist. He has also contacted a DBT program and has been put on Welbutrin to assist his Effexor for ADHD and hypersomnia. He would like to continue on these stimulants because they \"help [him] function.\" He notes he also has hypoglycemia, and is " given permission to do clear liquids before surgery in order to keep his blood sugar at a reasonable level. Also had vocal cord cysts removed under GA with no problems.     NOTE: may need to give additional pain meds postop given medical history and KACY use, as well as h/o FM flares.  Consider using LATEX FREE protocol.    Total time = 45 minutes, spent on the date of encounter doing chart review, history and physical, dressing changes, documentation, patient education, and any further activity as noted above. He questions latex allergy (as grandmother has a reaction to latex).    This note was prepared on behalf of Anita Grant MD by Jennifer Dominguez, a trained medical scribe, based on my observations and the provider's statements to me.       Anita Grant MD

## 2022-09-11 ENCOUNTER — HEALTH MAINTENANCE LETTER (OUTPATIENT)
Age: 27
End: 2022-09-11

## 2022-09-12 ENCOUNTER — OFFICE VISIT (OUTPATIENT)
Dept: SURGERY | Facility: CLINIC | Age: 27
End: 2022-09-12
Payer: COMMERCIAL

## 2022-09-12 ENCOUNTER — PRE VISIT (OUTPATIENT)
Dept: SURGERY | Facility: CLINIC | Age: 27
End: 2022-09-12

## 2022-09-12 ENCOUNTER — ANESTHESIA EVENT (OUTPATIENT)
Dept: SURGERY | Facility: CLINIC | Age: 27
End: 2022-09-12
Payer: COMMERCIAL

## 2022-09-12 VITALS
BODY MASS INDEX: 37.61 KG/M2 | RESPIRATION RATE: 16 BRPM | SYSTOLIC BLOOD PRESSURE: 121 MMHG | HEART RATE: 65 BPM | WEIGHT: 225.7 LBS | DIASTOLIC BLOOD PRESSURE: 79 MMHG | HEIGHT: 65 IN | TEMPERATURE: 98.8 F | OXYGEN SATURATION: 99 %

## 2022-09-12 DIAGNOSIS — F64.0 GENDER DYSPHORIA IN ADOLESCENT AND ADULT: ICD-10-CM

## 2022-09-12 DIAGNOSIS — Z01.818 PRE-OP EXAMINATION: Primary | ICD-10-CM

## 2022-09-12 PROCEDURE — 99204 OFFICE O/P NEW MOD 45 MIN: CPT | Performed by: PHYSICIAN ASSISTANT

## 2022-09-12 RX ORDER — CLOTRIMAZOLE 1 %
CREAM (GRAM) TOPICAL PRN
COMMUNITY
Start: 2022-05-07 | End: 2024-08-14

## 2022-09-12 ASSESSMENT — ENCOUNTER SYMPTOMS: SEIZURES: 0

## 2022-09-12 ASSESSMENT — PAIN SCALES - GENERAL: PAINLEVEL: NO PAIN (1)

## 2022-09-12 NOTE — H&P
Pre-Operative H & P     CC:  Preoperative exam to assess for increased cardiopulmonary risk while undergoing surgery and anesthesia.    Date of Encounter: 9/12/2022  Primary Care Physician:  Jessica Cardoso     Reason for visit:   Encounter Diagnoses   Name Primary?     Pre-op examination Yes     Gender dysphoria in adolescent and adult        HPI  Gregg Maharaj is a 27 year old adult who presents for pre-operative H & P in preparation for  Procedure Information     Case: [id number] Date/Time: 09/26/22 0730    Procedure: MASTECTOMY, BILATERAL, SIMPLE, possible nipple grafts. OnQ (Bilateral Breast)    Anesthesia type: General    Diagnosis: Gender dysphoria in adolescent and adult [F64.0]    Pre-op diagnosis: Gender dysphoria in adolescent and adult [F64.0]    [facility]: [facility]    Providers: Anita Grant MD          The patient is a 27-year-old transgender man with a past medical history significant for allergies, asthma, vocal cord dysfunction, history of concussion, migraines, non epileptic events, obesity, GERD, pelvic floor dysfunction, interstitial cystitis, VERNON positive, fibromyalgia, hypermobility of joints, hypersomnia, ADHD, BPD, PTSD, insomnia and anxiety and depression. The patient has been meeting with Dr. Grant for discussion for the procedure as above. He was seen on 9/6/22 and counseled on the above procedure.     History is obtained from the patient and chart review    Hx of abnormal bleeding or anti-platelet use: none      Past Medical History  Past Medical History:   Diagnosis Date     ADHD (attention deficit hyperactivity disorder)      VERNON positive      Anxiety      Asthma      Borderline personality disorder (H)      Concussion      Depression      Fibromyalgia      Gastroesophageal reflux disease with esophagitis      Gender dysphoria in adolescent and adult      Hypermobility of joint      Hypersomnia      Migraine      Mucous retention cyst of maxillary sinus      Obesity       PTSD (post-traumatic stress disorder)      Seasonal allergic rhinitis      Urinary frequency        Past Surgical History  Past Surgical History:   Procedure Laterality Date     Direct microlaryngoscopy with biopsy  06/29/2022       Prior to Admission Medications  Current Outpatient Medications   Medication Sig Dispense Refill     albuterol (PROAIR HFA/PROVENTIL HFA/VENTOLIN HFA) 108 (90 Base) MCG/ACT inhaler Inhale 2 puffs into the lungs as needed       azelastine (ASTELIN) 0.1 % nasal spray USE 1 TO 2 SPRAYS IN EACH NOSTRIL 1 TO 2 TIMES DAILY AS NEEDED       baclofen (LIORESAL) 10 MG tablet TAKE 1 TO 2 TABLETS BY MOUTH EVERY NIGHT       buPROPion (WELLBUTRIN XL) 150 MG 24 hr tablet Take 150 mg by mouth every morning       cetirizine (ZYRTEC) 10 MG tablet Take 10 mg by mouth as needed       clindamycin-benzoyl peroxide (BENZACLIN) 1-5 % external gel as needed       diazepam (VALIUM) 5 MG tablet VAGINAL VALIUM SUPPOSITORY 5MG AT NIGHT AND PRIOR TO THERAPY AS NEEDED (Patient taking differently: as needed VAGINAL VALIUM SUPPOSITORY 5MG AT NIGHT AND PRIOR TO THERAPY AS NEEDED) 40 tablet 3     diphenhydrAMINE (BENADRYL) 25 MG capsule Take 25 mg by mouth as needed       EPINEPHrine (ANY BX GENERIC EQUIV) 0.3 MG/0.3ML injection 2-pack Inject into the lateral thigh as needed for life threatening allergic reactions.       estradiol cypionate (DEPO-ESTRADIOL) 5 MG/ML injection Inject into the muscle every 3 months       famotidine (PEPCID) 20 MG tablet Take 20 mg by mouth 2 times daily       gabapentin (NEURONTIN) 300 MG capsule Take 300-600 mg by mouth 3 times daily as needed 900 mg at night       ibuprofen (ADVIL/MOTRIN) 200 MG capsule Take by mouth as needed       ketoconazole (NIZORAL) 2 % external shampoo Apply topically every other day To wet scalp and let sit x 3-5 (Patient taking differently: Apply topically daily as needed To wet scalp and let sit x 3-5) 120 mL 3     loratadine (CLARITIN) 10 MG tablet Take 10 mg  by mouth every morning       Methylphenidate HCl (RITALIN PO) Take 10 mg by mouth 3 times daily (with meals)       montelukast (SINGULAIR) 10 MG tablet Take 10 mg by mouth At Bedtime       oxybutynin ER (DITROPAN XL) 5 MG 24 hr tablet Take 1 tablet (5 mg) by mouth daily (Patient taking differently: Take 5 mg by mouth every morning) 90 tablet 2     propranolol ER (INDERAL LA) 160 MG 24 hr capsule Take 160 mg by mouth At Bedtime       SUMAtriptan (IMITREX) 50 MG tablet Take 50 mg by mouth at onset of headache       testosterone (ANDROGEL 1.62 % PUMP) 20.25 MG/ACT gel Place 1 Pump onto the skin every morning       triamcinolone (KENALOG) 0.1 % external ointment Apply topically 2 times daily To rashes on the tops of the hands until clear. Use moisturizer on top and through out the day. (Patient taking differently: Apply topically as needed To rashes on the tops of the hands until clear. Use moisturizer on top and through out the day.) 80 g 1     venlafaxine (EFFEXOR-ER) 150 MG TB24 24 hr tablet Take 300 mg by mouth every morning       clotrimazole (LOTRIMIN) 1 % external cream as needed       CONCERTA 54 MG CR tablet Take 54 mg by mouth daily (Patient not taking: Reported on 9/12/2022)       Methylphenidate HCl (CONCERTA PO) Take 36 mg by mouth daily (Patient not taking: Reported on 9/12/2022)       OTHER MEDICAL SUPPLIES        Specialty Vitamins Products (VITAMINS FOR HAIR) CAPS Take 1 tablet by mouth daily (Patient not taking: Reported on 9/12/2022)         Allergies  Allergies   Allergen Reactions     Capsaicin Anaphylaxis     Capsicum Annuum Extract & Derivative (Bell Pepper) [Capsicum] Anaphylaxis     No Clinical Screening - See Comments Anaphylaxis, Dermatitis, Hives, Itching, Rash and Shortness Of Breath     Cotton seed oil  Cotton seed oil     Piper Anaphylaxis, Hives, Itching and Shortness Of Breath     Red chili peppers (harissa paste)     Adhesive Tape      Other reaction(s): Atopic Dermatitis  Other  reaction(s): Atopic Dermatitis  Other reaction(s): Atopic Dermatitis     Other Environmental Allergy Hives     Cotton seed oil     Metoclopramide Anxiety       Social History  Social History     Socioeconomic History     Marital status: Single     Spouse name: Not on file     Number of children: Not on file     Years of education: Not on file     Highest education level: Not on file   Occupational History     Not on file   Tobacco Use     Smoking status: Never Smoker     Smokeless tobacco: Never Used   Substance and Sexual Activity     Alcohol use: Not on file     Drug use: Not on file     Sexual activity: Not on file   Other Topics Concern     Not on file   Social History Narrative     Not on file     Social Determinants of Health     Financial Resource Strain: Not on file   Food Insecurity: Not on file   Transportation Needs: Not on file   Physical Activity: Not on file   Stress: Not on file   Social Connections: Not on file   Intimate Partner Violence: Not on file   Housing Stability: Not on file       Family History  Family History   Problem Relation Age of Onset     Anesthesia Reaction No family hx of      Deep Vein Thrombosis (DVT) No family hx of        Review of Systems  The complete review of systems is negative other than noted in the HPI or here.   Anesthesia Evaluation   Pt has had prior anesthetic. Type: General.    History of anesthetic complications   patient reports he was very groggy and had some muscle soreness after 6/2022 anesthesia.    ROS/MED HX  ENT/Pulmonary: Comment: Sinus mucous retention cyst    Vocal cord dysfunction    (+) allergic rhinitis, Intermittent, asthma Treatment: Inhaler prn and Oral steroids,      Neurologic: Comment: Concussion 8/9/22    Physiogenic non epileptic events    (+) migraines,  (-) no seizures, no CVA and no TIA   Cardiovascular:  - neg cardiovascular ROS   (+) -----Previous cardiac testing   Echo: Date: Results:    Stress Test: Date: Results:    ECG Reviewed:  "Date: 12/8/21 Results:  SB  Cath: Date: Results:      METS/Exercise Tolerance: 4 - Raking leaves, gardening    Hematologic:  - neg hematologic  ROS     Musculoskeletal: Comment: VERNON positive    Fibromyalgia    Hypermobility of joints: hands, knees, elbows and hips      GI/Hepatic:     (+) GERD, Asymptomatic on medication,     Renal/Genitourinary: Comment: Pelvic floor dysfunction    Urinary urgency/ frequency     interstitial cystitis       Endo:     (+) Obesity,     Psychiatric/Substance Use:     (+) psychiatric history anxiety, depression and other (comment) (hypersomnia, ADHD, PTSD, borderline personality disorder)     Infectious Disease:  - neg infectious disease ROS     Malignancy:  - neg malignancy ROS     Other:  - neg other ROS          /79 (BP Location: Right arm, Patient Position: Sitting, Cuff Size: Adult Large)   Pulse 65   Temp 98.8  F (37.1  C) (Oral)   Resp 16   Ht 1.651 m (5' 5\")   Wt 102.4 kg (225 lb 11.2 oz)   SpO2 99%   Breastfeeding No   BMI 37.56 kg/m      Physical Exam   Constitutional: Awake, alert, cooperative, no apparent distress, and appears stated age.  Eyes: Pupils equal, round and reactive to light, extra ocular muscles intact, sclera clear, conjunctiva normal.   HENT: Normocephalic, oral pharynx with moist mucus membranes, good dentition. No goiter appreciated. Multiple facial piercing's   Respiratory: Clear to auscultation bilaterally, no crackles or wheezing.  Cardiovascular: Regular rate and rhythm, normal S1 and S2, and no murmur noted.  Carotids +2, no bruits. No edema. Palpable pulses to radial  DP and PT arteries.   GI: Normal bowel sounds, soft, non-distended, non-tender, no masses palpated, no hepatosplenomegaly.    Lymph/Hematologic: No cervical lymphadenopathy and no supraclavicular lymphadenopathy.  Genitourinary:  defer  Skin: Warm and dry.  No rashes at anticipated surgical site.   Musculoskeletal: Full ROM of neck. There is no redness, warmth, or swelling " of the joints. Gross motor strength is normal.    Neurologic: Awake, alert, oriented to name, place and time. Cranial nerves II-XII are grossly intact. Gait is normal.   Neuropsychiatric: Calm, cooperative. Normal affect.     Prior Labs/Diagnostic Studies   All labs and imaging personally reviewed   CBC w PLT no Diff  Specimen:  Blood - Blood specimen (specimen)   Ref Range & Units 1 mo ago   WHITE BLOOD COUNT         4.5 - 11.0 thou/cu mm 6.8    RED BLOOD COUNT           4.30 - 5.90 mil/cu mm 5.82    HEMOGLOBIN                13.5 - 17.5 g/dL 16.2    HEMATOCRIT                37.0 - 53.0 % 49.1    MCV                       80 - 100 fL 84    MCH                       26.0 - 34.0 pg 27.8    MCHC                      32.0 - 36.0 g/dL 33.0    RDW                       11.5 - 15.5 % 12.8    PLATELET COUNT            140 - 440 thou/cu mm 235    MPV                       6.5 - 11.0 fL 11.0    NRBC                      % 0.0    ABS NRBC thou /cu mm 0.0    Resulting [facility]AL LABORAT[facility]   Specimen Collected: 08/09/22  2:42 PM Last Resulted: 08/09/22  3:32 PM     basic Metabolic Panel  Specimen:  Blood - Blood specimen (specimen)   Ref Range & Units 1 mo ago Comments   SODIUM 135 - 145 mmol/L 137     POTASSIUM   Canceled- Specimen Hemolyzed   CHLORIDE 98 - 110 mmol/L 106     CO2,TOTAL 21 - 31 mmol/L 22     ANION GAP 5 - 18 9     GLUCOSE 65 - 100 mg/dL 97     CALCIUM 8.5 - 10.5 mg/dL 9.0     BUN 8 - 25 mg/dL 9     CREATININE 0.72 - 1.25 mg/dL 0.79     BUN/CREAT RATIO           10 - 20 11     eGFR >90 mL/min/1.73m2 >90  As of 03/15/2022, eGFR is calculated by the CKD-EPI creatinine equation without race adjustment.  eGFR can be influenced by muscle mass, exercise, and diet.  The reported eGFR is an estimation only and is only applicable if the renal function is stable.   [facility] LAB[facility]ATORY-CEN[facility]AL LABORATORY    Specimen Collected: 08/09/22  2:42 PM Last Resulted: 08/09/22   4:20 PM     POTASSIUM  Specimen:  Blood - Blood specimen (specimen)   Ref Range & Units 1 mo ago   POTASSIUM 3.5 - 5.0 mmol/L 4.9    Resulting Agency  [facility] LAB[facility]ATORY-CENTRAL LABORATORY   Specimen Collected: 08/09/22  4:43 PM Last Resulted: 08/09/22  5:09 PM         EKG/ stress test - if available please see in ROS above     EEG 8/12/22  Impression: Normal study.     Clinical Correlation and Recommendations: A normal EEG does not   rule out the diagnosis of epilepsy.  Clinical correlation is   required. Semiology is suggestive of psychogenic non-epileptic   events.     The patient's records and results personally reviewed by this provider.     Outside records reviewed from: Care Everywhere      Assessment      Gregg Maharaj is a 27 year old adult seen as a PAC referral for risk assessment and optimization for anesthesia.    Plan/Recommendations  Pt will be optimized for the proposed procedure.  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 The patient reports he is followed by outside neurologist and has had 3 EEGs - all showing normal function without seizures. The patient is doing DBT.   ~ Migraines - continue baseline propranolol. Hold imitrex DOS  - Chronic Pain  On chronic opiates, morphine equivilant = None   -Post Op delirium risk factors:  No risk identified    ENT  - No current airway concerns.  Will need to be reassessed day of surgery.  Mallampati: II  TM: > 3   ~ The patient was found to have sinus mucous retention cyst on MRI 8/10/22. No sinusitis or URI symptoms.   ~ history of vocal cord dysfunction s/p DL with biopsy 6/2022. Seen in follow up on 7/15/22. Now resolved.     CARDIAC  - No history of CAD, Hypertension and Afib  - METS (Metabolic Equivalents)  Patient performs 4 or more METS exercise without symptoms            Total Score: 0      RCRI-Very low risk: Class 1 0.4%  "complication rate            Total Score: 0    ~ The patient is followed by PT and reports [place] today without any issues. He will at time use a cane or wheelchair due to his fibromyalgia or hypermobility. He denies any cardiac symptoms.     PULMONARY  - Obstructive Sleep Apnea  No current risk of obstructive sleep apnea   RUPA Low Risk            Total Score: 2    RUPA: BMI over 35 kg/m2    RUPA: Male      - Asthma  Well controlled  - Seasonal allergies   - Tobacco History      History   Smoking Status     Never Smoker   Smokeless Tobacco     Never Used       GI  - GERD  Controlled on medications: Proton Pump Inhibitor  PONV High Risk  Total Score: 3           1 AN PONV: Pt is Female    1 AN PONV: Patient is not a current smoker    1 AN PONV: Intended Post Op Opioids        /RENAL  - Baseline Creatinine  0.79  ~ Pelvic floor dysfunction/ urinary urgency/frequency - patient uses vaginal valium the night before pelvic floor PT.   ~ Interstitial cystitis - he denies UTI symptoms today. Continue ditropan    ENDOCRINE    - BMI: Estimated body mass index is 37.56 kg/m  as calculated from the following:    Height as of this encounter: 1.651 m (5' 5\").    Weight as of this encounter: 102.4 kg (225 lb 11.2 oz).  Obesity (BMI >30)  - No history of Diabetes Mellitus   ~ The patient does Depo injections every 3 months and is due on 10/6/22. He does topical Testerone     HEME  VTE Low Risk 0.5%            Total Score: 2    VTE: Male      - No history of abnormal bleeding or antiplatelet use.      Immunology  ~ VERNON positive/ hypermobility of joints/ fibromyalgia - followed by rheumatology and seen on 7/21/22. He reports in November 2022 he will have further follow up for Yaneli Danlos. He has the most mobility in his hands, knees, elbows and hips    PSYCH  - hypersomnia/ ADHD - the patient will continue baseline Concerta and hold ritalin DOS.   ~ PTSD/ borderline personality disorder/ " anxiety/ depression - continue valium, wellbutrin, effexor and propranolol  ~ Sleep maintenance insomnia - continue neurontin    Patient was discussed with Dr Alatorre    The patient is optimized for their procedure. AVS with information on surgery time/arrival time, meds and NPO status given by nursing staff. No further diagnostic testing indicated.      On the day of service:     Prep time: 15 minutes  Visit time: 21 minutes  Documentation time: 14 minutes  ------------------------------------------  Total time: 50 minutes      Dina Carrillo PA-C  Preoperative Assessment Center  [facility]  [facility]  Phone: [phone]  Fax: [phone]

## 2022-09-25 ASSESSMENT — ENCOUNTER SYMPTOMS: SEIZURES: 0

## 2022-09-25 NOTE — ANESTHESIA PREPROCEDURE EVALUATION
Anesthesia Pre-Procedure Evaluation    Patient: Gregg Maharaj   MRN: [id number] : 1995        Procedure : Procedure(s):  MASTECTOMY, BILATERAL, SIMPLE, possible nipple grafts. OnQ          Past Medical History:   Diagnosis Date     ADHD (attention deficit hyperactivity disorder)      VERNON positive      Anxiety      Asthma      Borderline personality disorder (H)      Concussion      Depression      Fibromyalgia      Gastroesophageal reflux disease with esophagitis      Gender dysphoria in adolescent and adult      Hypermobility of joint      Hypersomnia      Migraine      Mucous retention cyst of maxillary sinus      Obesity      PTSD (post-traumatic stress disorder)      Seasonal allergic rhinitis      Urinary frequency       Past Surgical History:   Procedure Laterality Date     Direct microlaryngoscopy with biopsy  2022      Allergies   Allergen Reactions     Capsaicin Anaphylaxis     Capsicum Annuum Extract & Derivative (Bell Pepper) [Capsicum] Anaphylaxis     No Clinical Screening - See Comments Anaphylaxis, Dermatitis, Hives, Itching, Rash and Shortness Of Breath     Cotton seed oil  Cotton seed oil     Piper Anaphylaxis, Hives, Itching and Shortness Of Breath     Red chili peppers (harissa paste)     Adhesive Tape      Other reaction(s): Atopic Dermatitis  Other reaction(s): Atopic Dermatitis  Other reaction(s): Atopic Dermatitis     Other Environmental Allergy Hives     Cotton seed oil     Metoclopramide Anxiety      Social History     Tobacco Use     Smoking status: Never Smoker     Smokeless tobacco: Never Used   Substance Use Topics     Alcohol use: Not on file      Wt Readings from Last 1 Encounters:   22 102.4 kg (225 lb 11.2 oz)        Anesthesia Evaluation   Pt has had prior anesthetic. Type: General.    History of anesthetic complications   patient reports he was very groggy and had some muscle soreness after 2022 anesthesia.    ROS/MED HX  ENT/Pulmonary: Comment: Sinus mucous  retention cyst    Vocal cord dysfunction    (+) allergic rhinitis, Intermittent, asthma Treatment: Inhaler prn and Oral steroids,      Neurologic: Comment: Concussion 8/9/22    Physiogenic non epileptic events    (+) migraines,  (-) no seizures, no CVA and no TIA   Cardiovascular:  - neg cardiovascular ROS   (+) -----Previous cardiac testing   Echo: Date: Results:    Stress Test: Date: Results:    ECG Reviewed: Date: 12/8/21 Results:  SB  Cath: Date: Results:      METS/Exercise Tolerance: 4 - Raking leaves, gardening    Hematologic:  - neg hematologic  ROS     Musculoskeletal: Comment: VERNON positive    Fibromyalgia    Hypermobility of joints: hands, knees, elbows and hips      GI/Hepatic:     (+) GERD, Asymptomatic on medication,     Renal/Genitourinary: Comment: Pelvic floor dysfunction    Urinary urgency/ frequency     interstitial cystitis       Endo:     (+) Obesity,     Psychiatric/Substance Use:     (+) psychiatric history anxiety, depression and other (comment) (hypersomnia, ADHD, PTSD, borderline personality disorder)     Infectious Disease:  - neg infectious disease ROS     Malignancy:  - neg malignancy ROS     Other:  - neg other ROS          Physical Exam    Airway        Mallampati: II   TM distance: > 3 FB   Neck ROM: full   Mouth opening: > 3 cm    Respiratory Devices and Support         Dental           Cardiovascular             Pulmonary                   OUTSIDE LABS:  CBC:   Lab Results   Component Value Date    WBC 8.2 07/21/2022    HGB 16.4 07/21/2022    HCT 49.3 07/21/2022     07/21/2022     BMP:   Lab Results   Component Value Date    CR 0.8 02/14/2022     COAGS:   Lab Results   Component Value Date    INR 0.99 07/21/2022     POC: No results found for: BGM, HCG, HCGS  HEPATIC: No results found for: ALBUMIN, PROTTOTAL, ALT, AST, GGT, ALKPHOS, BILITOTAL, BILIDIRECT, SAIRA  OTHER:   Lab Results   Component Value Date    CRP <3.00 07/21/2022    SED 4 07/21/2022       Anesthesia Plan    ASA  Status:  3   NPO Status:  NPO Appropriate    Anesthesia Type: General.     - Airway: ETT   Induction: Intravenous.   Maintenance: Balanced.        Consents            Postoperative Care    Pain management: IV analgesics.   PONV prophylaxis: Ondansetron (or other 5HT-3), Dexamethasone or Solumedrol, Background Propofol Infusion     Comments:                Omid Zavala MD

## 2022-09-26 ENCOUNTER — ANESTHESIA (OUTPATIENT)
Dept: SURGERY | Facility: CLINIC | Age: 27
End: 2022-09-26
Payer: COMMERCIAL

## 2022-09-26 ENCOUNTER — HOSPITAL ENCOUNTER (OUTPATIENT)
Facility: CLINIC | Age: 27
Discharge: HOME | End: 2022-09-26
Attending: SURGERY | Admitting: SURGERY
Payer: COMMERCIAL

## 2022-09-26 VITALS
RESPIRATION RATE: 16 BRPM | TEMPERATURE: 97.5 F | WEIGHT: 225.31 LBS | OXYGEN SATURATION: 93 % | HEART RATE: 93 BPM | SYSTOLIC BLOOD PRESSURE: 126 MMHG | BODY MASS INDEX: 37.54 KG/M2 | DIASTOLIC BLOOD PRESSURE: 70 MMHG | HEIGHT: 65 IN

## 2022-09-26 DIAGNOSIS — F64.0 GENDER DYSPHORIA IN ADULT: Primary | ICD-10-CM

## 2022-09-26 LAB — GLUCOSE BLDC GLUCOMTR-MCNC: 100 MG/DL (ref 70–99)

## 2022-09-26 PROCEDURE — 250N000009 HC RX 250: Performed by: STUDENT IN AN ORGANIZED HEALTH CARE EDUCATION/TRAINING PROGRAM

## 2022-09-26 PROCEDURE — 250N000013 HC RX MED GY IP 250 OP 250 PS 637: Performed by: STUDENT IN AN ORGANIZED HEALTH CARE EDUCATION/TRAINING PROGRAM

## 2022-09-26 PROCEDURE — 82962 GLUCOSE BLOOD TEST: CPT

## 2022-09-26 PROCEDURE — 271N000002 HC RX 271: Performed by: SURGERY

## 2022-09-26 PROCEDURE — 370N000017 HC ANESTHESIA TECHNICAL FEE, PER MIN: Performed by: SURGERY

## 2022-09-26 PROCEDURE — 272N000001 HC OR GENERAL SUPPLY STERILE: Performed by: SURGERY

## 2022-09-26 PROCEDURE — 19303 MAST SIMPLE COMPLETE: CPT | Mod: 50 | Performed by: SURGERY

## 2022-09-26 PROCEDURE — 710N000010 HC RECOVERY PHASE 1, LEVEL 2, PER MIN: Performed by: SURGERY

## 2022-09-26 PROCEDURE — 710N000012 HC RECOVERY PHASE 2, PER MINUTE: Performed by: SURGERY

## 2022-09-26 PROCEDURE — 250N000011 HC RX IP 250 OP 636: Performed by: SURGERY

## 2022-09-26 PROCEDURE — 250N000025 HC SEVOFLURANE, PER MIN: Performed by: SURGERY

## 2022-09-26 PROCEDURE — 19350 NIPPLE/AREOLA RECONSTRUCTION: CPT | Mod: 50 | Performed by: SURGERY

## 2022-09-26 PROCEDURE — 88305 TISSUE EXAM BY PATHOLOGIST: CPT | Mod: TC | Performed by: SURGERY

## 2022-09-26 PROCEDURE — 250N000009 HC RX 250: Performed by: SURGERY

## 2022-09-26 PROCEDURE — 250N000013 HC RX MED GY IP 250 OP 250 PS 637: Performed by: ANESTHESIOLOGY

## 2022-09-26 PROCEDURE — 250N000011 HC RX IP 250 OP 636: Performed by: STUDENT IN AN ORGANIZED HEALTH CARE EDUCATION/TRAINING PROGRAM

## 2022-09-26 PROCEDURE — 360N000076 HC SURGERY LEVEL 3, PER MIN: Performed by: SURGERY

## 2022-09-26 PROCEDURE — 88305 TISSUE EXAM BY PATHOLOGIST: CPT | Mod: 26 | Performed by: PATHOLOGY

## 2022-09-26 PROCEDURE — 258N000003 HC RX IP 258 OP 636: Performed by: STUDENT IN AN ORGANIZED HEALTH CARE EDUCATION/TRAINING PROGRAM

## 2022-09-26 PROCEDURE — 999N000141 HC STATISTIC PRE-PROCEDURE NURSING ASSESSMENT: Performed by: SURGERY

## 2022-09-26 RX ORDER — HYDROMORPHONE HYDROCHLORIDE 1 MG/ML
0.2 INJECTION, SOLUTION INTRAMUSCULAR; INTRAVENOUS; SUBCUTANEOUS EVERY 5 MIN PRN
Status: DISCONTINUED | OUTPATIENT
Start: 2022-09-26 | End: 2022-09-26 | Stop reason: HOSPADM

## 2022-09-26 RX ORDER — PROPOFOL 10 MG/ML
INJECTION, EMULSION INTRAVENOUS PRN
Status: DISCONTINUED | OUTPATIENT
Start: 2022-09-26 | End: 2022-09-26

## 2022-09-26 RX ORDER — CEFAZOLIN SODIUM/WATER 2 G/20 ML
2 SYRINGE (ML) INTRAVENOUS
Status: COMPLETED | OUTPATIENT
Start: 2022-09-26 | End: 2022-09-26

## 2022-09-26 RX ORDER — FENTANYL CITRATE 50 UG/ML
25 INJECTION, SOLUTION INTRAMUSCULAR; INTRAVENOUS
Status: DISCONTINUED | OUTPATIENT
Start: 2022-09-26 | End: 2022-09-26 | Stop reason: HOSPADM

## 2022-09-26 RX ORDER — ACETAMINOPHEN 325 MG/1
975 TABLET ORAL ONCE
Status: COMPLETED | OUTPATIENT
Start: 2022-09-26 | End: 2022-09-26

## 2022-09-26 RX ORDER — LIDOCAINE 40 MG/G
CREAM TOPICAL
Status: DISCONTINUED | OUTPATIENT
Start: 2022-09-26 | End: 2022-09-26 | Stop reason: HOSPADM

## 2022-09-26 RX ORDER — MEPERIDINE HYDROCHLORIDE 25 MG/ML
12.5 INJECTION INTRAMUSCULAR; INTRAVENOUS; SUBCUTANEOUS
Status: DISCONTINUED | OUTPATIENT
Start: 2022-09-26 | End: 2022-09-26 | Stop reason: HOSPADM

## 2022-09-26 RX ORDER — OXYCODONE HYDROCHLORIDE 5 MG/1
5 TABLET ORAL EVERY 4 HOURS PRN
Status: DISCONTINUED | OUTPATIENT
Start: 2022-09-26 | End: 2022-09-26 | Stop reason: HOSPADM

## 2022-09-26 RX ORDER — FENTANYL CITRATE 50 UG/ML
INJECTION, SOLUTION INTRAMUSCULAR; INTRAVENOUS PRN
Status: DISCONTINUED | OUTPATIENT
Start: 2022-09-26 | End: 2022-09-26

## 2022-09-26 RX ORDER — ONDANSETRON 2 MG/ML
INJECTION INTRAMUSCULAR; INTRAVENOUS PRN
Status: DISCONTINUED | OUTPATIENT
Start: 2022-09-26 | End: 2022-09-26

## 2022-09-26 RX ORDER — BUPIVACAINE HYDROCHLORIDE 2.5 MG/ML
INJECTION, SOLUTION EPIDURAL; INFILTRATION; INTRACAUDAL PRN
Status: DISCONTINUED | OUTPATIENT
Start: 2022-09-26 | End: 2022-09-26 | Stop reason: HOSPADM

## 2022-09-26 RX ORDER — FENTANYL CITRATE 50 UG/ML
25 INJECTION, SOLUTION INTRAMUSCULAR; INTRAVENOUS EVERY 5 MIN PRN
Status: DISCONTINUED | OUTPATIENT
Start: 2022-09-26 | End: 2022-09-26 | Stop reason: HOSPADM

## 2022-09-26 RX ORDER — CEFAZOLIN SODIUM/WATER 2 G/20 ML
2 SYRINGE (ML) INTRAVENOUS SEE ADMIN INSTRUCTIONS
Status: DISCONTINUED | OUTPATIENT
Start: 2022-09-26 | End: 2022-09-26 | Stop reason: HOSPADM

## 2022-09-26 RX ORDER — DIMENHYDRINATE 50 MG/ML
25 INJECTION, SOLUTION INTRAMUSCULAR; INTRAVENOUS
Status: DISCONTINUED | OUTPATIENT
Start: 2022-09-26 | End: 2022-09-26 | Stop reason: HOSPADM

## 2022-09-26 RX ORDER — ONDANSETRON 4 MG/1
4 TABLET, ORALLY DISINTEGRATING ORAL EVERY 8 HOURS PRN
Qty: 12 TABLET | Refills: 0 | Status: SHIPPED | OUTPATIENT
Start: 2022-09-26 | End: 2023-02-06

## 2022-09-26 RX ORDER — ONDANSETRON 4 MG/1
4 TABLET, ORALLY DISINTEGRATING ORAL EVERY 30 MIN PRN
Status: DISCONTINUED | OUTPATIENT
Start: 2022-09-26 | End: 2022-09-26 | Stop reason: HOSPADM

## 2022-09-26 RX ORDER — SODIUM CHLORIDE, SODIUM LACTATE, POTASSIUM CHLORIDE, CALCIUM CHLORIDE 600; 310; 30; 20 MG/100ML; MG/100ML; MG/100ML; MG/100ML
INJECTION, SOLUTION INTRAVENOUS CONTINUOUS
Status: DISCONTINUED | OUTPATIENT
Start: 2022-09-26 | End: 2022-09-26 | Stop reason: HOSPADM

## 2022-09-26 RX ORDER — ONDANSETRON 2 MG/ML
4 INJECTION INTRAMUSCULAR; INTRAVENOUS EVERY 30 MIN PRN
Status: DISCONTINUED | OUTPATIENT
Start: 2022-09-26 | End: 2022-09-26 | Stop reason: HOSPADM

## 2022-09-26 RX ORDER — KETOROLAC TROMETHAMINE 15 MG/ML
15 INJECTION, SOLUTION INTRAMUSCULAR; INTRAVENOUS EVERY 6 HOURS PRN
Status: DISCONTINUED | OUTPATIENT
Start: 2022-09-26 | End: 2022-09-26 | Stop reason: HOSPADM

## 2022-09-26 RX ORDER — HYDROMORPHONE HCL IN WATER/PF 6 MG/30 ML
0.2 PATIENT CONTROLLED ANALGESIA SYRINGE INTRAVENOUS EVERY 5 MIN PRN
Status: DISCONTINUED | OUTPATIENT
Start: 2022-09-26 | End: 2022-09-26 | Stop reason: HOSPADM

## 2022-09-26 RX ORDER — OXYCODONE HYDROCHLORIDE 5 MG/1
5 TABLET ORAL EVERY 6 HOURS PRN
Qty: 13 TABLET | Refills: 0 | Status: SHIPPED | OUTPATIENT
Start: 2022-09-26 | End: 2023-02-06

## 2022-09-26 RX ORDER — EPHEDRINE SULFATE 50 MG/ML
INJECTION, SOLUTION INTRAMUSCULAR; INTRAVENOUS; SUBCUTANEOUS PRN
Status: DISCONTINUED | OUTPATIENT
Start: 2022-09-26 | End: 2022-09-26

## 2022-09-26 RX ORDER — AZITHROMYCIN 250 MG/1
TABLET, FILM COATED ORAL
Qty: 6 TABLET | Refills: 0 | Status: SHIPPED | OUTPATIENT
Start: 2022-09-26 | End: 2022-10-01

## 2022-09-26 RX ORDER — HYDROXYZINE HYDROCHLORIDE 25 MG/1
25 TABLET, FILM COATED ORAL 3 TIMES DAILY PRN
Qty: 12 TABLET | Refills: 0 | Status: SHIPPED | OUTPATIENT
Start: 2022-09-26 | End: 2023-02-06

## 2022-09-26 RX ORDER — PROPOFOL 10 MG/ML
INJECTION, EMULSION INTRAVENOUS CONTINUOUS PRN
Status: DISCONTINUED | OUTPATIENT
Start: 2022-09-26 | End: 2022-09-26

## 2022-09-26 RX ORDER — LIDOCAINE HYDROCHLORIDE 20 MG/ML
INJECTION, SOLUTION INFILTRATION; PERINEURAL PRN
Status: DISCONTINUED | OUTPATIENT
Start: 2022-09-26 | End: 2022-09-26

## 2022-09-26 RX ORDER — DEXAMETHASONE SODIUM PHOSPHATE 4 MG/ML
INJECTION, SOLUTION INTRA-ARTICULAR; INTRALESIONAL; INTRAMUSCULAR; INTRAVENOUS; SOFT TISSUE PRN
Status: DISCONTINUED | OUTPATIENT
Start: 2022-09-26 | End: 2022-09-26

## 2022-09-26 RX ADMIN — Medication 5 MG: at 10:38

## 2022-09-26 RX ADMIN — OXYCODONE HYDROCHLORIDE 5 MG: 5 TABLET ORAL at 15:48

## 2022-09-26 RX ADMIN — SODIUM CHLORIDE, POTASSIUM CHLORIDE, SODIUM LACTATE AND CALCIUM CHLORIDE: 600; 310; 30; 20 INJECTION, SOLUTION INTRAVENOUS at 13:05

## 2022-09-26 RX ADMIN — Medication 10 MG: at 10:47

## 2022-09-26 RX ADMIN — FENTANYL CITRATE 25 MCG: 50 INJECTION, SOLUTION INTRAMUSCULAR; INTRAVENOUS at 14:47

## 2022-09-26 RX ADMIN — PROPOFOL 25 MCG/KG/MIN: 10 INJECTION, EMULSION INTRAVENOUS at 10:33

## 2022-09-26 RX ADMIN — HYDROMORPHONE HYDROCHLORIDE 0.2 MG: 1 INJECTION, SOLUTION INTRAMUSCULAR; INTRAVENOUS; SUBCUTANEOUS at 15:16

## 2022-09-26 RX ADMIN — HYDROMORPHONE HYDROCHLORIDE 0.5 MG: 1 INJECTION, SOLUTION INTRAMUSCULAR; INTRAVENOUS; SUBCUTANEOUS at 12:34

## 2022-09-26 RX ADMIN — FENTANYL CITRATE 25 MCG: 50 INJECTION, SOLUTION INTRAMUSCULAR; INTRAVENOUS at 14:22

## 2022-09-26 RX ADMIN — Medication 50 MG: at 10:00

## 2022-09-26 RX ADMIN — LIDOCAINE HYDROCHLORIDE 100 MG: 20 INJECTION, SOLUTION INFILTRATION; PERINEURAL at 09:59

## 2022-09-26 RX ADMIN — Medication: at 14:10

## 2022-09-26 RX ADMIN — PROPOFOL 200 MG: 10 INJECTION, EMULSION INTRAVENOUS at 10:00

## 2022-09-26 RX ADMIN — ONDANSETRON 4 MG: 2 INJECTION INTRAMUSCULAR; INTRAVENOUS at 13:44

## 2022-09-26 RX ADMIN — HYDROMORPHONE HYDROCHLORIDE 0.2 MG: 1 INJECTION, SOLUTION INTRAMUSCULAR; INTRAVENOUS; SUBCUTANEOUS at 14:55

## 2022-09-26 RX ADMIN — HYDROMORPHONE HYDROCHLORIDE 0.2 MG: 1 INJECTION, SOLUTION INTRAMUSCULAR; INTRAVENOUS; SUBCUTANEOUS at 15:32

## 2022-09-26 RX ADMIN — DEXAMETHASONE SODIUM PHOSPHATE 4 MG: 4 INJECTION, SOLUTION INTRA-ARTICULAR; INTRALESIONAL; INTRAMUSCULAR; INTRAVENOUS; SOFT TISSUE at 11:38

## 2022-09-26 RX ADMIN — Medication 2 G: at 10:15

## 2022-09-26 RX ADMIN — SODIUM CHLORIDE, POTASSIUM CHLORIDE, SODIUM LACTATE AND CALCIUM CHLORIDE: 600; 310; 30; 20 INJECTION, SOLUTION INTRAVENOUS at 09:51

## 2022-09-26 RX ADMIN — FENTANYL CITRATE 100 MCG: 50 INJECTION, SOLUTION INTRAMUSCULAR; INTRAVENOUS at 09:59

## 2022-09-26 RX ADMIN — Medication 50 MG: at 11:00

## 2022-09-26 RX ADMIN — SUGAMMADEX 200 MG: 100 INJECTION, SOLUTION INTRAVENOUS at 13:47

## 2022-09-26 RX ADMIN — FENTANYL CITRATE 25 MCG: 50 INJECTION, SOLUTION INTRAMUSCULAR; INTRAVENOUS at 14:28

## 2022-09-26 RX ADMIN — ACETAMINOPHEN 975 MG: 325 TABLET, FILM COATED ORAL at 15:48

## 2022-09-26 ASSESSMENT — ACTIVITIES OF DAILY LIVING (ADL)
ADLS_ACUITY_SCORE: 35

## 2022-09-26 NOTE — ANESTHESIA POSTPROCEDURE EVALUATION
Patient: Gregg Maharaj    Procedure: Procedure(s):  MASTECTOMY, BILATERAL, SIMPLE, nipple grafts. OnQ       Anesthesia Type:  General    Note:  Disposition: Outpatient   Postop Pain Control: Uneventful            Sign Out: Well controlled pain   PONV: No   Neuro/Psych: Uneventful            Sign Out: Acceptable/Baseline neuro status   Airway/Respiratory: Uneventful            Sign Out: Acceptable/Baseline resp. status   CV/Hemodynamics: Uneventful            Sign Out: Acceptable CV status; No obvious hypovolemia; No obvious fluid overload   Other NRE: NONE   DID A NON-ROUTINE EVENT OCCUR? No           Last vitals:  Vitals Value Taken Time   /82 09/26/22 1530   Temp 36.8  C (98.2  F) 09/26/22 1515   Pulse 91 09/26/22 1536   Resp 13 09/26/22 1536   SpO2 94 % 09/26/22 1536   Vitals shown include unvalidated device data.    Electronically Signed By: Kwasi Sumner MD  September 26, 2022  3:37 PM

## 2022-09-26 NOTE — BRIEF OP NOTE
[facility] Brief Operative Note    Pre-operative diagnosis: Gender dysphoria in adolescent and adult [F64.0]   Post-operative diagnosis * No post-op diagnosis entered *     Procedure: Procedure(s):  MASTECTOMY, BILATERAL, SIMPLE, nipple grafts. OnQ   Surgeon(s): Surgeon(s) and Role:     * Anita Grant MD - Primary     * Grecia Rodriguez PA-C   Estimated blood loss: 100cc   Specimens: ID Type Source Tests Collected by Time Destination   1 : Breast, Left Tissue Breast, Left SURGICAL PATHOLOGY EXAM Anita Grant MD 9/26/2022 11:09 AM    2 : Breast, Right Tissue Breast, Right SURGICAL PATHOLOGY EXAM Anita Grant MD 9/26/2022 11:10 AM    3 : Please add this tissue with rest of breast tissue already sent. DO NOT CHARGE additional test Tissue Breast, Right SURGICAL PATHOLOGY EXAM Anita Grant MD 9/26/2022 12:29 PM    4 : Please add this tissue with rest of breast tissue already sent. DO NOT CHARGE additional test Tissue Breast, Left SURGICAL PATHOLOGY EXAM Anita Grant MD 9/26/2022 12:31 PM       Findings: Bilateral simple mastectomies with free nipple grafts. Grafts secured with bolster dressings. Jpx2, on Q pump. Wrapped with kerlix and ace wrap.

## 2022-09-26 NOTE — OR NURSING
Per Grecia DYKES, no NSAIDS for patient in PACU and for 3 days post operatively; will hold toradol in PACU.    Report and care given to Laisha Stark RN.

## 2022-09-26 NOTE — ANESTHESIA CARE TRANSFER NOTE
Patient: Gregg Maharaj    Procedure: Procedure(s):  MASTECTOMY, BILATERAL, SIMPLE, nipple grafts. OnQ       Diagnosis: Gender dysphoria in adolescent and adult [F64.0]  Diagnosis Additional Information: No value filed.    Anesthesia Type:   General     Note:    Oropharynx: oropharynx clear of all foreign objects and spontaneously breathing  Level of Consciousness: awake  Oxygen Supplementation: face mask  Level of Supplemental Oxygen (L/min / FiO2): 6  Independent Airway: airway patency satisfactory and stable  Dentition: dentition unchanged  Vital Signs Stable: post-procedure vital signs reviewed and stable  Report to RN Given: handoff report given  Patient transferred to: PACU    Handoff Report: Identifed the Patient, Identified the Reponsible Provider, Reviewed the pertinent medical history, Discussed the surgical course, Reviewed Intra-OP anesthesia mangement and issues during anesthesia, Set expectations for post-procedure period and Allowed opportunity for questions and acknowledgement of understanding      Vitals:  Vitals Value Taken Time   /78 09/26/22 1417   Temp     Pulse 86 09/26/22 1420   Resp 15 09/26/22 1420   SpO2 95 % 09/26/22 1420   Vitals shown include unvalidated device data.    Electronically Signed By: Omid Zavala MD  September 26, 2022  2:22 PM

## 2022-09-26 NOTE — ANESTHESIA PROCEDURE NOTES
Airway       Patient location during procedure: OR       Procedure Start/Stop Times: 9/26/2022 10:07 AM  Staff -        Anesthesiologist:  Kwasi Sumner MD       Resident/Fellow: Omid Zavala MD       Performed By: resident  Consent for Airway        Urgency: elective  Indications and Patient Condition       Indications for airway management: kp-procedural       Induction type:intravenous       Mask difficulty assessment: 1 - vent by mask    Final Airway Details       Final airway type: endotracheal airway       Successful airway: ETT - single  Endotracheal Airway Details        ETT size (mm): 7.0       Cuffed: yes       Cuff volume (mL): 8       Successful intubation technique: direct laryngoscopy       DL Blade Type: MAC 4       Grade View of Cords: 2       Adjucts: stylet       Position: Right       Measured from: lips       Secured at (cm): 21       Bite block used: None    Post intubation assessment        Placement verified by: capnometry        Number of attempts at approach: 1       Number of other approaches attempted: 0       Secured with: cloth tape       Ease of procedure: easy       Dentition: Intact    Medication(s) Administered   Medication Administration Time: 9/26/2022 10:07 AM

## 2022-09-26 NOTE — DISCHARGE INSTRUCTIONS
No NSAIDs/ibuprofen for at least 3 days post-procedure    Tylenol last given at 3:48 pm. *Next ok at 9:48 pm*    Same-Day Surgery   Adult Discharge Orders & Instructions     For 24 hours after surgery:  Get plenty of rest.  A responsible adult must stay with you for at least 24 hours after you leave the hospital.   Pain medication can slow your reflexes. Do not drive or use heavy equipment.  If you have weakness or tingling, don't drive or use heavy equipment until this feeling goes away.  Mixing alcohol and pain medication can cause dizziness and slow your breathing. It can even be fatal. Do not drink alcohol while taking pain medication.  Avoid strenuous or risky activities.  Ask for help when climbing stairs.   You may feel lightheaded.  If so, sit for a few minutes before standing.  Have someone help you get up.   If you have nausea (feel sick to your stomach), drink only clear liquids such as apple juice, ginger ale, broth or 7-Up.  Rest may also help.  Be sure to drink enough fluids.  Move to a regular diet as you feel able. Take pain medications with a small amount of solid food, such as toast or crackers, to avoid nausea.   A slight fever is normal. Call the doctor if your fever is over 100 F (37.7 C) (taken under the tongue) or lasts longer than 24 hours.  You may have a dry mouth, muscle aches, trouble sleeping or a sore throat.  These symptoms should go away after 24 hours.  Do not make important or legal decisions.   Pain Management:      1. Take pain medication (if prescribed) for pain as directed by your physician.        2. WARNING: If the pain medication you have been prescribed contains Tylenol  (acetaminophen), DO NOT take additional doses of Tylenol (acetaminophen).     Call your doctor for any of the followin.  Signs of infection (fever, growing tenderness at the surgery site, severe pain, a large amount of drainage or bleeding, foul-smelling drainage, redness, swelling).    2.  It has been  over 8 to 10 hours since surgery and you are still not able to urinate (pee).    3.  Headache for over 24 hours.    4.  Numbness, tingling or weakness the day after surgery (if you had spinal anesthesia).  To contact a doctor, call Darrian Lundberg's office at [phone] at the [facility] from 8 am till 5 pm  or:  '   [phone] and ask for the Resident On Call for:          Plastic surgery  (answered 24 hours a day)  '   Emergency Department:  [facility]: [phone]  [facility]: [phone]               Rev. 10/2014          ON-Q  Continuous Pain Pump Discharge Instructions after Bilateral Mastectomy with Dr. Grant    The OnQ pump:     Dr. Grant inserted a pain pump under your incisions.  The pump is shaped like a balloon and is filled with medicine that causes numbness or loss of sensation to help control your pain around the incision.  The pain pump DOES NOT contain controlled substances.    The medicine in the pump may alter your ability to feel changes in temperature or pressure.       The Pump:    The pump delivers medicine at a very slow rate.  You will NOT see the medicine moving through the tubing.  As the medicine is delivered, the pump ball will slowly become smaller.  It may take a day or so before you notice a change in the size and look of the pump.  It typically takes 5 days for all the medicine in the pump to deliver.  The middle part of the pump may look like an apple core when empty.    Managing Your Pain:    The Medicine and Infusion Rate: 0.2% Ropivacaine at 4mL/ hr     The pain pump may not block all of the pain from your surgery so it is important that you take the pain medicines prescribed by your surgeon if you need them.    If you continue to have difficulty with your pain control, please contact Dr. Grant's clinic.    Caring for Your Pump at Home:    The pump functions when the blue sensor tubing in  contact with your skin. Your skin temperature keeps the valve open. Currently it is adhered with a clear dressing. Please reinforce this dressing as needed to ensure pump function.  Make sure there are no kinks in the tubing.  Do not tape or cover up the filter.  Protect the pump from sunlight and heat.  When sleeping:   Do not place the pump underneath the bed covers where the pump may become too warm.  Do not place the pump on the floor or hang the pump on a bed post as these situations may cause the tubing to get tangled and get pulled out.  Bathing/Showering:    We recommend taking sponge baths until the pump is removed.  It is important to not get the area where the tube enters your body wet.    Removing the Tubing:    Dr. Grant will take the pain pump tubes out at your follow up appointment. If you wish to remove the tubes yourself, follow these steps:    Wash your hands.  Remove the clear dressing that covers the tubing.  Grasp the tubing close to the skin and gently pull.  If you meet resistance, stop pulling and call Dr. Grant's clinic  DO NOT cut or forcefully remove the tubing.  After removal, check the end of the tubing for a dark tip.  If you do not see a dark tip, call Dr. Grant's clinic.  Apply firm pressure over the site until oozing stops.  Wash the area with soap and water, dry with a clean towel and then cover with a bandage.    The pump is not reusable. Dispose of it in the trash and wash your hands.     Troubleshooting:    Tubing Comes Out From Skin:  If the tube accidentally comes out, check the end of the tube for a dark tip.  If you see a dark tip simply discard it and do not attempt to reinsert the tube. You will also have to remove the tube on the other side.  If you don t see a dark tip, immediately call Dr. Grant's clinic.    Tubing Disconnection:  If the tubing accidentally becomes disconnected from the pump, DO NOT reconnect the pump to the tubing.  It may have been contaminated  with germs.  Remove the tubes as described above and call Dr. Grant's clinic.    Fluid Leaking:  If enough fluid is leaking from the pump or the tubing outside your body to soak through the dressing, please contact Dr. Grant's clinic.    Immediately report the following to Dr. Grant's clinic:    Redness, warmth, swelling, or tenderness at the site the tubing was inserted  Increase in pain  Fever, chills, sweats  Bowel or bladder changes  Difficulty breathing  Dizziness, lightheadedness  Blurred vision  Ringing or buzzing in your ears  Metal taste in your mouth  Numbness and/or tingling around your mouth, fingers or toes  Drowsiness  Confusion  Trouble removing the tubing  Dark tip is not present when tubing is removed       During daytime hours call Dr. Grant's plastic surgery clinic RN at [phone] or main office at [phone]     After hours and weekends: [phone] and ask for the Resident On Call for Plastic Surgeon       Caring for your Hilton-Kat Drains after Bilateral Mastectomy with Dr. Juanita Grant placed Hilton-Kat drainage tubes into your incisions. This tube drains fluid from your incision, helping prevent swelling and reducing the risk for infection. The tube is held in place by a few stitches. The drain will be removed at a follow up appointment when the amount of drainage decreases.     Home Care:  Make sure the tube does not pull. You may tape the tube to the skin below the bandage.  Secure the tube and bulb inside your clothing with a safety pin. This helps keep the tube from being pulled out.   Keep the bulb compressed at all times, except when you empty it.  Empty your drain at least twice a day. Empty it more often if the drain is full.   Wash your hands  Lift the opening of the drain.  Drain the fluid into a measuring cup.  Record the amount of fluid each time you empty in the chart below. Share the information with your doctor at your follow-up visit.   Squeeze  the bulb with your hands until you hear air coming out of the bulb.  Close the opening.      Stripping  the tube helps keep blood clots from blocking the tube. Do this twice a day when you empty the drain:    Hold the tubing where it leaves the skin with one hand. This keeps it from pulling on the skin.  Pinch the tubing with the thumb and first finger of your other hand.   Slowly and firmly pull your thumb and first finger down the tube (squeezing the tube between your fingers). Keep squeezing the tube as you run your fingers towards the bulb. If the pulling hurts or feels like it is coming out of the skin, STOP. Begin again more gently.  Let go of the tubing with both hands. If the tube is still blocked, repeat these steps three or four times. Make sure that the bulb is compressed so it creates suction.    When to call your doctor:  New or increased pain around the tube  Redness, warmth, or swelling around the incision or tube  Drainage that is foul smelling  Fever over 101 F degrees  Fluid leaking around the tube  Bulb won't hold suction  The tube falls out  A sudden amount of bright red drainage filling the bulb rapidly  A sudden decrease of fluid in bulb AND swelling with discomfort building up at site    Your drainage record:    Date Time Bulb 1: Amount of drainage (ml or cc) Bulb 2: Amount of drainage (ml or cc) Notes                                                                                            Rev. 4/2014

## 2022-09-27 NOTE — OR NURSING
Dr. Grant and Dr. Tapia notified regarding patient having intermittent oxygen desaturation episodes to upper 80s. Patient had required nasal cannula but was weaned to room air since 1830 without further desats. Dr. Grant present at bedside speaking with patient and partner. Verbal per Dr. Grant and Dr. Tapia ok for patient to discharge.

## 2022-09-30 NOTE — PROGRESS NOTES
Plastic Surgery Outpatient Visit    ID: Gregg Maharaj is a 27 year old transgender male / female-to-male s/p bilateral mastectomy with nipple grafts for gender dysphoria 9/26/2022 with Dr. Grant.     S: had concerns about L bolster staples getting pulled and also noticed some drainage from the area. He cleaned it with alcohol and tried to squeeze out some drainage. No true fevers but has had low grade temps of 99F. Removed onQ due to leaking. Mentions that he has history of folliculitis and does scratch at skin at times. Also mentions that the oxycodone made him feel more anxious and dysphoric. He is not taking the narcotics any more.     O:  /77   Pulse 84   Temp 98.5  F (36.9  C) (Oral)   SpO2 96%    General: NAD  Chest: left chest periareolar erythema spreading ~2cm around nipple graft. IMF incision c/d/i. Right chest with mild periareolar irritation/erythema. IMF incision c/d/i. Nipple grafts well adhered bilaterally.     PATH: in process    A/P:  -healing well but with mild periareolar cellulitis, L > R. Start bactrim x7 days.   -drains removed today  -nipple dressings x1 week  -wrap x1 week  - tape off in 2 weeks.  -Trex/5lb lifting restrictions x2 more weeks. Ok to increase movement and lifting up to 10lbs after this until 6 weeks post op, when restrictions will likely be cleared.  -RTC Friday for wound check    Grecia Rodriguez PA-C  Plastic and Reconstructive Surgery    20 minutes spent on the date of the encounter doing chart review, history and physical, dressing changes, documentation and further activity as noted above.

## 2022-10-03 NOTE — OP NOTE
Procedure Date: 09/26/2022    ATTENDING SURGEON:  Jose Grant MD    ASSISTANT:  Grecia Rodriguez PA-C (no resident available, MAYCOL did 50% of case).    PREOPERATIVE DIAGNOSIS:  Gender dysphoria.    POSTOPERATIVE DIAGNOSIS:  Gender dysphoria.    PROCEDURE PERFORMED:  Bilateral simple mastectomies.  Nipple grafts.  On-Q catheter placement.    ANESTHESIA:  GETA.    ESTIMATED BLOOD LOSS:  100 mL    INTRAVENOUS FLUIDS:  1200 mL    URINE OUTPUT:  500 mL    COUNTS:  Correct.    COMPLICATIONS:  None.    DRAINS:  FELIPE x2.    SPECIMENS:  Right breast 882 grams, left breast 891 grams.    INDICATIONS FOR PROCEDURE:  This is a 27-year-old biological female with a diagnosis of gender dysphoria.  They met WPATH and insurance criteria for gender-affirming top surgery.  Due to the patient's breast volume and ptosis, they would require double incision approach to mastectomy.  They desired nipple graft reconstruction.    DESCRIPTION OF PROCEDURE:  The patient was seen in the preoperative waiting area.  The operative sites were marked.  This included sternal notch, sternal midline, inframammary folds with possible extensions laterally, vertical line through the nipple areolar complex, medial and lateral border of the breast footplate, palpable inferior origin of the pectoralis major muscle and then transposed mid humeral level.  Informed consent was obtained after reviewing possible risks and complications, including but not limited to the following:  Infection, bleeding, hematoma/seroma formation, poor healing, possible dehiscence, possible spitting sutures, possible hypertrophic scarring, possible partial or complete loss of nipple grafts, possible fat necrosis of the skin flap, possible injury surrounding neurovascular musculoskeletal structures as well as intrathoracic and interaxillary structures, possible altered sensation of the chest wall either hypo or hypersensitivity, possible residual deformities and asymmetries,  possible need for further surgery, possible anesthetic risks such as DVT, PE and cardiopulmonary arrest.  The patient was then brought to the operating room and placed supine on the OR table.  After general anesthesia was administered, the patient was oral endotracheally intubated, a Solorzano was placed.  The patient already had sequential compression devices on the lower extremities prior to induction.  Thighs and forelegs were supported on pillows and secured with padded safety straps.  A lower Maria T Hugger was placed.  The arms were secured to arm boards at 90 degrees from the table using a 6-inch Ace wrap.  Great care was taken to pad all areas against pressure injury.  The patient was then placed in a sitting position and adjustments were made for symmetry.  The chest, breast area was then prepped and draped in the usual sterile fashion using ChloraPrep.  Timeout was taken and the proper patient and procedure were identified.  We made our inframammary fold incisions using the PEAK PlasmaBlade on the right side and traditional [facility] cautery with scalpel on the left.  We left approximately 2-3 cm of subcutaneous fat along the IMF border before beveling down to the pectoral fascia.  The breast mound was elevated off the pectoral fascia undermining superiorly towards the clavicle, medially towards parasternal border and laterally past the lateral border of the pectoralis major muscle.  This allowed us to displace the breast mound inferiorly and davon where it overlapped with our IMF incision.  The nipple-areolar complex was then harvested sharply and kept on the back table, marked per side and wrapped in normal saline gauze.  The breast mound was then amputated and additional thinning of the superior skin flap was done to gain symmetry as well as a good contour and the camouflage of any irregularities of the chest wall.  All resected tissue was ultimately passed off the back table to be weighed before sending to  Pathology for permanent histologic exam.  After original resection, the incisions were temporarily skin stapled and the patient put into a sitting position.  This allowed us to  for symmetry as far as contour and level and shape of the IMF incision.  We did extend our incisions laterally to minimize dog ear deformity.  We did not have to touch the midline.  Further tailoring was done and these additional path specimens were added to the final specimen.  Once we were happy with our contour and symmetry with incisions, the dissection pockets were irrigated with antibiotic saline solution.  Hemostasis was achieved with cautery.  A #15 round channel FELIPE drains were introduced through a separate stab incision laterally and secured with 3-0 nylon suture.  This was draped along the inferior border of the pectoralis.  Dual 10.5 10-inch On-Q catheters were percutaneously introduced from the epigastric region and draped along the superior pocket.  These were secured with benzoin and Tegaderm.  Definitive closure was then achieved, first using some 2-0 Vicryl deep sutures to help with tension, particularly around the lateral corner.  This was followed by 2-0 and 3-0 Vicryl deep dermal buried sutures followed by 4-0 Vicryl running subcuticular suture and some 5-0 fast absorbing gut for touchups.      We then turned our attention to nipple grafting.  The NACs had been aggressively thinned on the back table.  With the patient in sitting position, we made a 1.5 cm diameter nipple template and used these to help localize the most aesthetic appealing position for nipple grafting.  This was marked and then de-epithelialized sharply with a 15-blade.  Judicious use of cautery along with digital pressure was used for hemostasis.  Our nipple areolar complexes were then trimmed to fit the recipient site.  Fortunately, we did not have to reduce the nipple further.  The graft was secured with a 5-0 fast absorbing gut interrupted and  running cuticular suture.  The nipple was anchored above and below.  The graft was pie crusted with an 18-gauge needle.  Bolster dressing consisting of Xeroform sheet with antibiotic-soaked cotton balls was held in place with 2-0 silk tieovers and skin staples, Dermabond Prineo was applied to the incisions.  FELIPE drain tubing was trimmed and put to bulb suction.  Dressings of ABDs for drain sponges and a couple Kerlix rolls unfurled across the anterior chest for padding before wrapping circumferentially with a double long 6-inch Ace wrap for compression of the thorax.  Solorzano was discontinued.  The patient was extubated and transferred to a stretcher.  The On-Q catheters were attached to the reservoir containing 550 mL of 0.2% ropivacaine to be delivered at 2 mL per hour per catheter for the next 5 days.  The patient was then taken to the recovery room in satisfactory condition having tolerated the procedure without difficulty or complication.    Final weights in the OR was right breast 882 grams and left breast 891 grams.    Anita Grant MD        D: 10/02/2022   T: 10/03/2022   MT: [id number]    Name:     KATELYN TORRES  MRN:      [id number]        Account:        [id number]   :      1995           Procedure Date: 2022     Document: [id number]

## 2022-10-04 ENCOUNTER — OFFICE VISIT (OUTPATIENT)
Dept: PLASTIC SURGERY | Facility: CLINIC | Age: 27
End: 2022-10-04
Payer: COMMERCIAL

## 2022-10-04 VITALS
HEART RATE: 84 BPM | OXYGEN SATURATION: 96 % | DIASTOLIC BLOOD PRESSURE: 77 MMHG | SYSTOLIC BLOOD PRESSURE: 117 MMHG | TEMPERATURE: 98.5 F

## 2022-10-04 DIAGNOSIS — L03.313 CELLULITIS OF CHEST WALL: ICD-10-CM

## 2022-10-04 DIAGNOSIS — F64.0 GENDER DYSPHORIA IN ADULT: Primary | ICD-10-CM

## 2022-10-04 PROCEDURE — 99024 POSTOP FOLLOW-UP VISIT: CPT | Performed by: PHYSICIAN ASSISTANT

## 2022-10-04 RX ORDER — SULFAMETHOXAZOLE/TRIMETHOPRIM 800-160 MG
1 TABLET ORAL 2 TIMES DAILY
Qty: 14 TABLET | Refills: 0 | Status: SHIPPED | OUTPATIENT
Start: 2022-10-04 | End: 2022-10-12

## 2022-10-04 ASSESSMENT — PAIN SCALES - GENERAL: PAINLEVEL: MODERATE PAIN (4)

## 2022-10-04 NOTE — LETTER
10/4/2022       RE: Gregg Maharaj  [address]  [address]  [place] [address]     Dear Colleague,    Thank you for referring your patient, Gregg Maharaj, to the [facility] [place] at [facility]. Please see a copy of my visit note below.    Plastic Surgery Outpatient Visit    ID: Gregg Maharaj is a 27 year old transgender male / female-to-male s/p bilateral mastectomy with nipple grafts for gender dysphoria 9/26/2022 with Dr. Grant.     S: had concerns about L bolster staples getting pulled and also noticed some drainage from the area. He cleaned it with alcohol and tried to squeeze out some drainage. No true fevers but has had low grade temps of 99F. Removed onQ due to leaking. Mentions that he has history of folliculitis and does scratch at skin at times. Also mentions that the oxycodone made him feel more anxious and dysphoric. He is not taking the narcotics any more.     O:  /77   Pulse 84   Temp 98.5  F (36.9  C) (Oral)   SpO2 96%    General: NAD  Chest: left chest periareolar erythema spreading ~2cm around nipple graft. IMF incision c/d/i. Right chest with mild periareolar irritation/erythema. IMF incision c/d/i. Nipple grafts well adhered bilaterally.     PATH: in process    A/P:  -healing well but with mild periareolar cellulitis, L > R. Start bactrim x7 days.   -drains removed today  -nipple dressings x1 week  -wrap x1 week  - tape off in 2 weeks.  -Trex/5lb lifting restrictions x2 more weeks. Ok to increase movement and lifting up to 10lbs after this until 6 weeks post op, when restrictions will likely be cleared.  -RTC Friday for wound check    Grecia Rodriguez PA-C  Plastic and Reconstructive Surgery    20 minutes spent on the date of the encounter doing chart review, history and physical, dressing changes, documentation and further activity as noted above.

## 2022-10-04 NOTE — NURSING NOTE
Chief Complaint   Patient presents with     Surgical Followup     1 week post-op: DOS 9/26       Vitals:    10/04/22 1011   BP: 117/77   Pulse: 84   Temp: 98.5  F (36.9  C)   TempSrc: Oral   SpO2: 96%       There is no height or weight on file to calculate BMI.          PAUL BRADEN EMT

## 2022-10-04 NOTE — PATIENT INSTRUCTIONS
Care of Nipples and Chest Incisions    Change nipple dressings once daily for 1 week. Shower with dressings in place for 1 week. After that, ok to remove dressings prior to showering. No DIRECT shower spray on nipple grafts for 4 weeks from your surgery date but water running over incisions and grafts are ok.      Nipple graft dressing changes: Cut vaseline gauze to nipple size and place over nipple. Place folded white gauze over vaseline gauze and cover with plastic dressing. Do dressing changes for 1 week from your post op visit. You may then apply a thin layer of Aquaphor to the nipples until healed.     Keep compression on for 1 more week from today. Continue modified T-Red arms for 2 more weeks. At 3 weeks post op you may begin to use your arms as you normally would. Remove the tape from your incisions by 3 weeks post op. You may start moisturizing your incisions with any non-scented, non-glittered lotion 3 weeks from your surgery date. You can start scar care after the tape is removed. Any over the counter scar care is ok, we recommend silicone strips or sheets. These can be purchased on [employer] and at [employer].     At one month post op, baseline shoulder motion should return. Full shoulder motion should return by 8 weeks.      When to call:  Sudden increase of swelling or pain on one side  Uncontrolled pain despite pain medication  Worsening of chest swelling   Separation of nipple from chest skin  Redness and warmth in chest area  Fever > 101     Contact the RN between 8-3:30 Mon-Fri with questions or concerns through my chart message via your doctor's name (most efficient) or call at [phone].   For urgent medical issues that cannot wait, call [phone] Mon-Fri 8:-4:30.     After hours, weekends or holidays, call [phone] and ask to speak to the on call plastic surgeon fellow.

## 2022-10-07 ENCOUNTER — OFFICE VISIT (OUTPATIENT)
Dept: PLASTIC SURGERY | Facility: CLINIC | Age: 27
End: 2022-10-07
Payer: COMMERCIAL

## 2022-10-07 VITALS
HEART RATE: 86 BPM | OXYGEN SATURATION: 98 % | DIASTOLIC BLOOD PRESSURE: 78 MMHG | SYSTOLIC BLOOD PRESSURE: 114 MMHG | TEMPERATURE: 98.8 F

## 2022-10-07 DIAGNOSIS — F64.0 GENDER DYSPHORIA IN ADULT: ICD-10-CM

## 2022-10-07 DIAGNOSIS — L03.313 CELLULITIS OF CHEST WALL: Primary | ICD-10-CM

## 2022-10-07 LAB
PATH REPORT.COMMENTS IMP SPEC: NORMAL
PATH REPORT.COMMENTS IMP SPEC: NORMAL
PATH REPORT.FINAL DX SPEC: NORMAL
PATH REPORT.GROSS SPEC: NORMAL
PATH REPORT.MICROSCOPIC SPEC OTHER STN: NORMAL
PATH REPORT.RELEVANT HX SPEC: NORMAL
PHOTO IMAGE: NORMAL

## 2022-10-07 PROCEDURE — 99024 POSTOP FOLLOW-UP VISIT: CPT | Performed by: PHYSICIAN ASSISTANT

## 2022-10-07 ASSESSMENT — PAIN SCALES - GENERAL: PAINLEVEL: MODERATE PAIN (4)

## 2022-10-07 NOTE — NURSING NOTE
Chief Complaint   Patient presents with     Surgical Followup     1.5 week post-op check up        Vitals:    10/07/22 1141   BP: 114/78   Pulse: 86   Temp: 98.8  F (37.1  C)   TempSrc: Oral   SpO2: 98%       There is no height or weight on file to calculate BMI.          PAUL BRADEN EMT

## 2022-10-07 NOTE — PROGRESS NOTES
Plastic Surgery Outpatient Visit     ID: Gregg Maharaj is a 27 year old transgender male / female-to-male s/p bilateral mastectomy with nipple grafts for gender dysphoria 9/26/2022 with Dr. Grant.      S: patient is following up for wound check. Per-iaerolar cellulitis from previous appointment is improving. Has been taking bactrim as prescribed. No issues with medications. Redness is improving. No fevers or chills, no nausea, vomiting or new skin lesions.      O:  /78   Pulse 86   Temp 98.8  F (37.1  C) (Oral)   SpO2 98%   General: NAD  Chest: left chest periareolar erythema improving around nipple graft. Photo in chart. IMF incision c/d/i. Right chest with periareolar irritation/erythema that is improving from previous exam. IMF incision c/d/i. Nipple grafts well adhered bilaterally.      PATH: pending      A/P:  27 year old transgender male s/p bilateral simple mastectomy     -finish bactrim  -nipple dressings x1 week  -wrap x1 week  - tape off in 2 weeks.  -Trex/5lb lifting restrictions x2 more weeks. Ok to increase movement and lifting up to 10lbs after this until 6 weeks post op, when restrictions will likely be cleared.  -RTC if redness returns, or new symptoms

## 2022-10-07 NOTE — LETTER
10/7/2022       RE: Gregg Maharaj  [address]  [address]  [place] [address]     Dear Colleague,    Thank you for referring your patient, Gregg Maharaj, to the [facility] [place] at [facility]. Please see a copy of my visit note below.    Plastic Surgery Outpatient Visit     ID: Gregg Maharaj is a 27 year old transgender male / female-to-male s/p bilateral mastectomy with nipple grafts for gender dysphoria 9/26/2022 with Dr. Grant.      S: patient is following up for wound check. Per-iaerolar cellulitis from previous appointment is improving. Has been taking bactrim as prescribed. No issues with medications. Redness is improving. No fevers or chills, no nausea, vomiting or new skin lesions.      O:  /78   Pulse 86   Temp 98.8  F (37.1  C) (Oral)   SpO2 98%   General: NAD  Chest: left chest periareolar erythema improving around nipple graft. Photo in chart. IMF incision c/d/i. Right chest with periareolar irritation/erythema that is improving from previous exam. IMF incision c/d/i. Nipple grafts well adhered bilaterally.      PATH: pending      A/P:  27 year old transgender male s/p bilateral simple mastectomy     -finish bactrim  -nipple dressings x1 week  -wrap x1 week  - tape off in 2 weeks.  -Trex/5lb lifting restrictions x2 more weeks. Ok to increase movement and lifting up to 10lbs after this until 6 weeks post op, when restrictions will likely be cleared.  -RTC if redness returns, or new symptoms      Sincerely,    Padmini Andrews PA-C

## 2022-10-12 DIAGNOSIS — L03.313 CELLULITIS OF CHEST WALL: ICD-10-CM

## 2022-10-12 RX ORDER — SULFAMETHOXAZOLE/TRIMETHOPRIM 800-160 MG
1 TABLET ORAL 2 TIMES DAILY
Qty: 14 TABLET | Refills: 0 | Status: SHIPPED | OUTPATIENT
Start: 2022-10-12 | End: 2023-02-06

## 2022-10-13 NOTE — PROGRESS NOTES
Plastic Surgery Outpatient Visit    ID: Gregg Maharaj is a 27 year old transgender male / female-to-male s/p bilateral mastectomy with nipple grafts for gender dysphoria 9/26/2022 with Dr. Grant. Post op course c/b periareolar cellulitis.     S: Doing ok. Started second rx for bactrim. Picked many sutures around nipples. Is wearing tshirts to try and stop picking. Did not sleep well last night. Started scar strips. Complains of yeast infection from antibiotics.     O:  /84   Pulse 101   Temp 98.9  F (37.2  C) (Oral)   SpO2 98%    General: NAD  Chest: bilateral chest incision c/d/i, all tape removed. Nipple grafts healing well. Small areas of exudate to R superior nipple graft. No surrounding erythema.     PATH:  A. LEFT breast, simple mastectomy with nipple graft:  -Benign breast tissue  -Negative for atypia or malignancy     B. LEFT breast, simple mastectomy with nipple graft:  -Benign breast tissue  -Negative for atypia or malignancy      A/P:  -healing well and erythema much improved since photos sent mid-week.  -finish course of bactrim  -ok for silicone strips, if areas open up avoid strips on these spots, can cover with bandaid.  -diflucan sent for yeast infection  -RTC as scheduled, contact us any concerns prior    Grecia Rodriguez PA-C  Plastic and Reconstructive Surgery    15 minutes spent on the date of the encounter doing chart review, history and physical, dressing changes, documentation and further activity as noted above.

## 2022-10-14 ENCOUNTER — TELEPHONE (OUTPATIENT)
Dept: PLASTIC SURGERY | Facility: CLINIC | Age: 27
End: 2022-10-14

## 2022-10-14 ENCOUNTER — OFFICE VISIT (OUTPATIENT)
Dept: PLASTIC SURGERY | Facility: CLINIC | Age: 27
End: 2022-10-14
Payer: COMMERCIAL

## 2022-10-14 VITALS
SYSTOLIC BLOOD PRESSURE: 136 MMHG | DIASTOLIC BLOOD PRESSURE: 84 MMHG | TEMPERATURE: 98.9 F | OXYGEN SATURATION: 98 % | HEART RATE: 101 BPM

## 2022-10-14 DIAGNOSIS — F64.0 GENDER DYSPHORIA IN ADULT: Primary | ICD-10-CM

## 2022-10-14 DIAGNOSIS — B37.31 YEAST INFECTION OF THE VAGINA: ICD-10-CM

## 2022-10-14 PROCEDURE — 99024 POSTOP FOLLOW-UP VISIT: CPT | Mod: KX | Performed by: PHYSICIAN ASSISTANT

## 2022-10-14 RX ORDER — FLUCONAZOLE 150 MG/1
150 TABLET ORAL ONCE
Qty: 2 TABLET | Refills: 0 | Status: SHIPPED | OUTPATIENT
Start: 2022-10-14 | End: 2022-10-14

## 2022-10-14 NOTE — TELEPHONE ENCOUNTER
LM for patient to ask if they would be able to been seen today at 10:00 instead of 11:30 per Grecia.    Call back number was provided.

## 2022-10-14 NOTE — NURSING NOTE
Chief Complaint   Patient presents with     Surgical Followup     Post-op cellulitis       Vitals:    10/14/22 0947   BP: 136/84   Pulse: 101   Temp: 98.9  F (37.2  C)   TempSrc: Oral   SpO2: 98%       There is no height or weight on file to calculate BMI.          PAUL BRADEN EMT

## 2022-10-14 NOTE — LETTER
10/14/2022       RE: Gregg Maharaj  [address]  [address]  [place] [address]     Dear Colleague,    Thank you for referring your patient, Gregg Maharaj, to the [facility] AND RECONSTRU[facility]IVE SURGERY CLINIC [place] at [facility]. Please see a copy of my visit note below.    Plastic Surgery Outpatient Visit    ID: Gregg Maharaj is a 27 year old transgender male / female-to-male s/p bilateral mastectomy with nipple grafts for gender dysphoria 9/26/2022 with Dr. Grant. Post op course c/b periareolar cellulitis.     S: Doing ok. Started second rx for bactrim. Picked many sutures around nipples. Is wearing tshirts to try and stop picking. Did not sleep well last night. Started scar strips. Complains of yeast infection from antibiotics.     O:  /84   Pulse 101   Temp 98.9  F (37.2  C) (Oral)   SpO2 98%    General: NAD  Chest: bilateral chest incision c/d/i, all tape removed. Nipple grafts healing well. Small areas of exudate to R superior nipple graft. No surrounding erythema.     PATH:  A. LEFT breast, simple mastectomy with nipple graft:  -Benign breast tissue  -Negative for atypia or malignancy     B. LEFT breast, simple mastectomy with nipple graft:  -Benign breast tissue  -Negative for atypia or malignancy      A/P:  -healing well and erythema much improved since photos sent mid-week.  -finish course of bactrim  -ok for silicone strips, if areas open up avoid strips on these spots, can cover with bandaid.  -diflucan sent for yeast infection  -RTC as scheduled, contact us any concerns prior    Grecia Rodriguez PA-C  Plastic and Reconstructive Surgery    15 minutes spent on the date of the encounter doing chart review, history and physical, dressing changes, documentation and further activity as noted above.

## 2022-10-16 ENCOUNTER — MYC MEDICAL ADVICE (OUTPATIENT)
Dept: PLASTIC SURGERY | Facility: CLINIC | Age: 27
End: 2022-10-16

## 2022-10-17 ENCOUNTER — THERAPY VISIT (OUTPATIENT)
Dept: PHYSICAL THERAPY | Facility: CLINIC | Age: 27
End: 2022-10-17
Payer: COMMERCIAL

## 2022-10-17 DIAGNOSIS — K59.00 CONSTIPATION, UNSPECIFIED CONSTIPATION TYPE: ICD-10-CM

## 2022-10-17 DIAGNOSIS — M99.05 SOMATIC DYSFUNCTION OF PELVIS REGION: ICD-10-CM

## 2022-10-17 DIAGNOSIS — R39.15 URINARY URGENCY: Primary | ICD-10-CM

## 2022-10-17 PROCEDURE — 97530 THERAPEUTIC ACTIVITIES: CPT | Mod: GP | Performed by: PHYSICAL THERAPIST

## 2022-10-17 PROCEDURE — 97164 PT RE-EVAL EST PLAN CARE: CPT | Mod: GP | Performed by: PHYSICAL THERAPIST

## 2022-10-17 PROCEDURE — 97140 MANUAL THERAPY 1/> REGIONS: CPT | Mod: GP | Performed by: PHYSICAL THERAPIST

## 2022-10-17 NOTE — PROGRESS NOTES
Physical Therapy Initial Evaluation  Subjective:  The history is provided by the patient. No  was used.   Therapist Generated HPI Evaluation  Problem details: Patient returns after a flare up of some bladder pain following top half surgery on 9/26/2022.  Had a catheter in place and was on pain medication that caused issues with constipation.  Was also on antifungals and antibiotics for an infection.  Overall doing better now.  Still having trouble with relaxation.  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  Only getting up once at night.  Voiding every 2 hours usually.  Trying to increase fiber intake if food source is not an issue.  .         Type of problem:  Pelvic dysfunction.    This is a recurrent condition.  Condition occurred with:  After surgery.  Where condition occurred: at home.  Patient reports pain:  Vaginal and other (bladder).  Pain is described as aching and is intermittent.  Radiates to: none.   Since onset symptoms are gradually improving.  Symptoms are exacerbated by other (medications)  Relieved by: getting off medications.    Previous treatment includes physical therapy. There was moderate improvement following previous treatment.  Restrictions due to condition include:  Currently not working due to present treatment.  Barriers include:  Requires assistance with ADL's.    Patient Health History                Red flags:  None as reported by patient.                                                    Objective:  System                                 Pelvic Dysfunction Evaluation:    Bladder/Pelvic Problems:  Bladder pain and constipation  Storage Problem:  Urgency        Diagnostic Tests:    Pelvic Exam:  X                      Flexibility:  NA      Abdominal Wall:  normal        Pelvic Clock Exam:    Ischiocavernosis pain:  +  Bulbocavernosis pain:  +      Perineal Body:  +  SI Provocation:  NA        Reflex Testing:   NA    External Assessment:    Skin Condition:  Irritation          Muscle Contraction/Perineal Mobility:  Substitution  Internal Assessment:  Internal assessment pelvic: flickering of muscles with trying to contract and relax, general increase in tone at introitus and levator bilaterally.    Contraction/Grade:  Fllicker muscles stretched (1)                               General     ROS    Assessment/Plan:    Patient is a 27 year old adult with pelvic complaints.    Patient has the following significant findings with corresponding treatment plan.                Diagnosis 1:  Pelvic dysfunction  Pain -  manual therapy, self management, education and home program  Decreased ROM/flexibility - manual therapy, therapeutic exercise and home program  Decreased strength - therapeutic exercise, therapeutic activities and home program  Impaired muscle performance - neuro re-education and home program  Decreased function - therapeutic activities and home program    Therapy Evaluation Codes:   1) History comprised of:   Personal factors that impact the plan of care:      Time since onset of symptoms.    Comorbidity factors that impact the plan of care are:      recent surgery/ ADHD.     Medications impacting care: ADHD medication.  2) Examination of Body Systems comprised of:   Body structures and functions that impact the plan of care:      Pelvis.   Activity limitations that impact the plan of care are:      Urgency and constipation.  3) Clinical presentation characteristics are:   Evolving/Changing.  4) Decision-Making    Moderate complexity using standardized patient assessment instrument and/or measureable assessment of functional outcome.  Cumulative Therapy Evaluation is: Moderate complexity.    Previous and current functional limitations:  (See Goal Flow Sheet for this information)    Short term and Long term goals: (See Goal Flow Sheet for this information)     Communication ability:  Patient appears to be able to  clearly communicate and understand verbal and written communication and follow directions correctly.  Patient's partner also present.  Treatment Explanation - The following has been discussed with the patient:   RX ordered/plan of care  Anticipated outcomes  Possible risks and side effects  This patient would benefit from PT intervention to resume normal activities.   Rehab potential is good.    Frequency:  1 X a month, once daily  Duration:  for 3 months  Discharge Plan:  Achieve all LTG.  Independent in home treatment program.  Reach maximal therapeutic benefit.    Please refer to the daily flowsheet for treatment today, total treatment time and time spent performing 1:1 timed codes.

## 2022-10-17 NOTE — PROGRESS NOTES
[facility]    OUTPATIENT Physical Therapy ORTHOPEDIC EVALUATION  PLAN OF TREATMENT FOR OUTPATIENT REHABILITATION  (COMPLETE FOR INITIAL CLAIMS ONLY)  Patient's Last Name, First Name, M.I.  YOB: 1995  Gregg Maharaj    Provider s Name:  [facility]   Medical Record No.  [id number]   Start of Care Date:  10/17/22   Onset Date:  09/23/22 (3/30/2022, date patient saw MD)   Treatment Diagnosis:  interstitial cystitis Medical Diagnosis:     Urinary urgency  Somatic dysfunction of pelvis region  Constipation, unspecified constipation type       Goals:     10/17/22 0700   Pelvic Pain   Previous Functional Level No restrictions   Current Functional Level Level of pain experienced with   Performance Level bladder pain with voiding and at rest   STG Target Performance Decrease pain with   Performance Level no pain with all voiding   Rationale for pain free ADL's   Due Date 11/21/22   LTG Target Performance Decrease pain with   Performance Level no bladder pain at rest   Rationale for pain free ADL's;painfree yearly pelvic exams to allow detection of health related issues   Due Date 01/15/23   Constipation/Obstructive Defecation   Previous Functional Level No issues   Current Functional Level Bowel movements with Medications/Supplements   LTG Target Performance Decrease use of medications/supplements to   Performance Level only using fruits and vegetables to help with fiber   Rationale Work toward normal voiding or evacuation patterns to focus on ADLS, work, or school   Due Date 11/22/22       Therapy Frequency:  once a month  Predicted Duration of Therapy Intervention:  3 months    Tracey Medina, PT                 I CERTIFY THE NEED FOR THESE SERVICES FURNISHED UNDER        THIS PLAN OF TREATMENT AND WHILE UNDER MY CARE     (Physician attestation of this document indicates  review and certification of the therapy plan).                     Certification Date From:  01/14/23   Certification Date To:  01/14/23    Referring Provider:  Marnie Stout    Initial Assessment        See Epic Evaluation SOC Date: 10/17/22

## 2022-10-31 ENCOUNTER — VIRTUAL VISIT (OUTPATIENT)
Dept: RHEUMATOLOGY | Facility: CLINIC | Age: 27
End: 2022-10-31
Payer: COMMERCIAL

## 2022-10-31 DIAGNOSIS — G89.4 CHRONIC PAIN SYNDROME: ICD-10-CM

## 2022-10-31 DIAGNOSIS — M24.9 HYPERMOBILE JOINTS: ICD-10-CM

## 2022-10-31 DIAGNOSIS — R76.8 POSITIVE ANA (ANTINUCLEAR ANTIBODY): Primary | ICD-10-CM

## 2022-10-31 DIAGNOSIS — M79.7 FIBROMYALGIA: ICD-10-CM

## 2022-10-31 PROCEDURE — 99215 OFFICE O/P EST HI 40 MIN: CPT | Mod: 95 | Performed by: INTERNAL MEDICINE

## 2022-10-31 RX ORDER — BUPROPION HYDROCHLORIDE 150 MG/1
150 TABLET ORAL EVERY MORNING
COMMUNITY
Start: 2022-10-21 | End: 2024-08-14 | Stop reason: ALTCHOICE

## 2022-10-31 NOTE — PROGRESS NOTES
Gregg is a 27 year old who is being evaluated via a billable video visit.      How would you like to obtain your AVS? MyChart  If the video visit is dropped, the invitation should be resent by: [phone]  Will anyone else be joining your video visit? No        Video-Visit Details    Video Start Time: 2:05 PM    Type of service:  Video Visit    Video End Time:2:24 PM    Originating Location (pt. Location): Home        Distant Location (provider location):  On-site    Platform used for Video Visit: [employer]     This document was created using a software with less than 100% fidelity, at times resulting in unintended, even erroneous syntax and grammar.  The reader is advised to keep this under consideration while reviewing, interpreting this note.    ASSESSMENT AND PLAN:  Gregg A Maharaj 27 year old adult is seen here on 10/31/22 for evaluation of longstanding polyarthralgias polymyalgias going back to his grade school years.  He has hypermobility.  He is well aware of the management principles.  Positive VERNON has been noted in the past at 1: 320.  Negative dsDNA, CALEB's and complements were not low.  The likelihood that he has a systemic connective tissue disease appears to be low based on his descriptions today, review of the data going back few years, the previous to rheumatology visit notes were reviewed.  The concern around Raynaud's requires further observation I have asked him to transmit pictures of the hands, feet should he experience changes that he describes as Raynaud's.  Further management of chronic pain he is to follow-up with a primary physician.  He expressed frustration that various rheumatologist have not listened to him.  He is concerned that he might develop the symptoms of a connective tissue disease.  In that case I have reassured him that he will be seen here and rheumatology.  We will send him literature on SLE, rheumatoid arthritis, Sjogren's.  He is to follow-up here on as-needed  "basis.  Diagnoses and all orders for this visit:  Positive VERNON (antinuclear antibody)  Chronic pain syndrome  Fibromyalgia  Hypermobile joints  In addition to today's video communication and the prior rheumatology visits data at [facility] were reviewed.  HISTORY OF PRESENTING ILLNESS:  Gregg Maharaj, 27 year old, adult is here for establishment of care.  He has longstanding painful joints, myalgias going as far back as grade school.  He has been evaluated in multiple rheumatological offices this will be his fourth.  He reports that his joint pains are worsening over the years.  He has pain in his hips and knees shoulders and hands.  He follows up at physical therapy for muscle strengthening to help with hypermobility.  Over the recent year or so he has been starting to use \"rings\" to counter any deformities that might develop.  He noted no swelling of the small joints of the hands wrists elbows.  He has had ankle swelling in the past.  He reports history of what is been described as Raynaud's.  By this he means feeling cold in his fingers and toes in cold weather and sometimes it purple he wonders if there has been a patellar component.  I have asked him to take pictures next time such episode happens.  He noted mouth ulcers when he is stressed.  He is not sure if he has photosensitivity as he noted history of eczema.  There is no history of DVT or PE.  He has recently undergone bilateral mastectomy.  He has been told not to \"lift\" his arms beyond 90 degrees of abduction.  He reports a variety of symptoms that have troubled him over the years.  He recalls initially he was thought to have narcolepsy.  He has not been told that he has psychogenic seizure disorder.  He reports interstitial cystitis where he gets intermittent white cells in his urine he follows up with urology.  He has history of eczema he follows up with \"2 dermatologist\".  Most recently he was seen in Dr." Kasie's office in [place].  He recalls being told that he should have his labs drawn every 12 months on serial basis and follow-up in rheumatology regularly.    ALLERGIES:Capsaicin, Capsicum annuum extract & derivative (bell pepper) [capsicum], No clinical screening - see comments, Piper, Adhesive tape, Other environmental allergy, and Metoclopramide    PAST MEDICAL/ACTIVE PROBLEMS/MEDICATION/ FAMILY HISTORY/SOCIAL DATA:  The patient has a family history of  Past Medical History:   Diagnosis Date     ADHD (attention deficit hyperactivity disorder)      VERNON positive      Anxiety      Asthma      Borderline personality disorder (H)      Concussion      Depression      Fibromyalgia      Gastroesophageal reflux disease with esophagitis      Gender dysphoria in adolescent and adult      Hypermobility of joint      Hypersomnia      Migraine      Mucous retention cyst of maxillary sinus      Obesity      PTSD (post-traumatic stress disorder)      Seasonal allergic rhinitis      Urinary frequency      History   Smoking Status     Never   Smokeless Tobacco     Never     Patient Active Problem List   Diagnosis     Acute UTI     VERNON positive     Bilateral leg pain     Dysmenorrhea     Elevated CK     Mild intermittent asthma without complication     Narcolepsy and cataplexy     Panic     Urinary urgency     Somatic dysfunction of pelvis region     Constipation     Current Outpatient Medications   Medication Sig Dispense Refill     albuterol (PROAIR HFA/PROVENTIL HFA/VENTOLIN HFA) 108 (90 Base) MCG/ACT inhaler Inhale 2 puffs into the lungs as needed       azelastine (ASTELIN) 0.1 % nasal spray USE 1 TO 2 SPRAYS IN EACH NOSTRIL 1 TO 2 TIMES DAILY AS NEEDED       baclofen (LIORESAL) 10 MG tablet TAKE 1 TO 2 TABLETS BY MOUTH EVERY NIGHT       buPROPion (WELLBUTRIN XL) 150 MG 24 hr tablet Take 150 mg by mouth every morning       cetirizine (ZYRTEC) 10 MG tablet Take 10 mg by mouth as needed       clindamycin-benzoyl  peroxide (BENZACLIN) 1-5 % external gel as needed       clotrimazole (LOTRIMIN) 1 % external cream as needed       CONCERTA 54 MG CR tablet Take 54 mg by mouth daily       diazepam (VALIUM) 5 MG tablet VAGINAL VALIUM SUPPOSITORY 5MG AT NIGHT AND PRIOR TO THERAPY AS NEEDED (Patient taking differently: as needed VAGINAL VALIUM SUPPOSITORY 5MG AT NIGHT AND PRIOR TO THERAPY AS NEEDED) 40 tablet 3     diphenhydrAMINE (BENADRYL) 25 MG capsule Take 25 mg by mouth as needed       EPINEPHrine (ANY BX GENERIC EQUIV) 0.3 MG/0.3ML injection 2-pack Inject into the lateral thigh as needed for life threatening allergic reactions.       estradiol cypionate (DEPO-ESTRADIOL) 5 MG/ML injection Inject into the muscle every 3 months       famotidine (PEPCID) 20 MG tablet Take 20 mg by mouth 2 times daily       gabapentin (NEURONTIN) 300 MG capsule Take 300-600 mg by mouth 3 times daily as needed 900 mg at night       hydrOXYzine (ATARAX) 25 MG tablet Take 1 tablet (25 mg) by mouth 3 times daily as needed for itching Do not take if you are already taking benadryl for itching. 12 tablet 0     ibuprofen (ADVIL/MOTRIN) 200 MG capsule Take by mouth as needed       ketoconazole (NIZORAL) 2 % external shampoo Apply topically every other day To wet scalp and let sit x 3-5 (Patient taking differently: Apply topically daily as needed To wet scalp and let sit x 3-5) 120 mL 3     loratadine (CLARITIN) 10 MG tablet Take 10 mg by mouth every morning       Methylphenidate HCl (RITALIN PO) Take 10 mg by mouth 3 times daily (with meals)       montelukast (SINGULAIR) 10 MG tablet Take 10 mg by mouth At Bedtime       ondansetron (ZOFRAN ODT) 4 MG ODT tab Take 1 tablet (4 mg) by mouth every 8 hours as needed for nausea 12 tablet 0     OTHER MEDICAL SUPPLIES        oxybutynin ER (DITROPAN XL) 5 MG 24 hr tablet Take 1 tablet (5 mg) by mouth daily (Patient taking differently: Take 5 mg by mouth every morning) 90 tablet 2     oxyCODONE (ROXICODONE) 5 MG tablet  Take 1 tablet (5 mg) by mouth every 6 hours as needed for severe pain 13 tablet 0     propranolol ER (INDERAL LA) 160 MG 24 hr capsule Take 160 mg by mouth At Bedtime       Specialty Vitamins Products (VITAMINS FOR HAIR) CAPS Take 1 tablet by mouth daily       sulfamethoxazole-trimethoprim (BACTRIM DS) 800-160 MG tablet Take 1 tablet by mouth 2 times daily 14 tablet 0     SUMAtriptan (IMITREX) 50 MG tablet Take 50 mg by mouth at onset of headache       testosterone (ANDROGEL 1.62 % PUMP) 20.25 MG/ACT gel Place 1 Pump onto the skin every morning       triamcinolone (KENALOG) 0.1 % external ointment Apply topically 2 times daily To rashes on the tops of the hands until clear. Use moisturizer on top and through out the day. (Patient taking differently: Apply topically as needed To rashes on the tops of the hands until clear. Use moisturizer on top and through out the day.) 80 g 1     venlafaxine (EFFEXOR-ER) 150 MG TB24 24 hr tablet Take 300 mg by mouth every morning         COMPREHENSIVE EXAMINATION:  There were no vitals filed for this visit.   As best as can be visualized on video examination he he has no swelling of the digits, he is able to touch his thumb to the distal forearm, he has hyperextension at the fifth digit and the elbow joints.  His skin over the olecranon and the dorsum of the hand is hyperextensible.  He he does not appear to have a rash on his face of malar area distribution.  He was unable to abduct at the shoulders as he was prohibited from abducting beyond 90 degrees given the recent surgery.      LAB / IMAGING DATA:  No results found for: ALT, ALBUMIN, CREATININE    WBC Count   Date Value Ref Range Status   07/21/2022 8.2 4.0 - 11.0 10e3/uL Final     Hemoglobin   Date Value Ref Range Status   07/21/2022 16.4 11.7 - 17.7 g/dL Final     Comment:     Reference Range:                                                     Female 11.7-15.7 g/dL                                      Male 13.3-17.7 g/dL      Platelet Count   Date Value Ref Range Status   07/21/2022 260 150 - 450 10e3/uL Final       No results found for: VERNON

## 2022-11-01 ENCOUNTER — MYC MEDICAL ADVICE (OUTPATIENT)
Dept: PLASTIC SURGERY | Facility: CLINIC | Age: 27
End: 2022-11-01

## 2022-11-01 NOTE — TELEPHONE ENCOUNTER
Called pt to follow up on [email or web address] message correspondence. Pt states that they observed a small area along the incision line develop a small amount of yellow-tinged discharge. Pt denies fevers, chills, or feeling generally unwell. Discussed with pt that what they are experiencing could be the body digesting an undissolved stitch. Pt shared that this is their thought as well, and they have already applied antibiotic ointment to the area and covered it with a bandage. Shared with pt that they have already done what is recommended and there are no concerns at this time. Encouraged pt to follow up if there are any new developments or concerns.    Jun Vázquez RN

## 2022-11-07 NOTE — TELEPHONE ENCOUNTER
Called pt to follow up on [employer] message with concerns about healing. Pt had top surgery with Dr Grant on 9/26/22. Unable to reach, left voicemail with callback number.     Pt called back, stated that the left side of their incision line is irritated. Pt has dermatillomania and has been having compulsions to pick this area along their incision line where there was a spitting suture. Pt states they have tried to avoid picking this healing area, but it has been challenging. Pt states they have been cleaning the area with an alcohol wipe and dial soap before applying antibiotic ointment. Pt's skin has been dried out from the alcohol swab and become itchy with flaking skin. Discussed avoiding alcohol swabs and gently cleaning the area with soap in the shower. Pt accepting to this. Encouraged pt to apply antibiotic ointment once daily to the granuloma area before applying a bandage. Pt has been doing this but sometimes removes the bandage to pick the area. Pt denies fevers (currently 98.7) and spreading redness from the area. Discussed ways to avoid and distract from picking this healing area. Pt accepting and will continue to avoid picking. Pt has an appointment with Dr Grant tomorrow and will discuss with her as well.     Jun Vázquez RN

## 2022-11-08 ENCOUNTER — OFFICE VISIT (OUTPATIENT)
Dept: PLASTIC SURGERY | Facility: CLINIC | Age: 27
End: 2022-11-08
Payer: COMMERCIAL

## 2022-11-08 ENCOUNTER — TRANSFERRED RECORDS (OUTPATIENT)
Dept: PLASTIC SURGERY | Facility: CLINIC | Age: 27
End: 2022-11-08

## 2022-11-08 VITALS
OXYGEN SATURATION: 97 % | TEMPERATURE: 99.1 F | SYSTOLIC BLOOD PRESSURE: 111 MMHG | WEIGHT: 222 LBS | DIASTOLIC BLOOD PRESSURE: 74 MMHG | BODY MASS INDEX: 36.99 KG/M2 | HEART RATE: 64 BPM | HEIGHT: 65 IN

## 2022-11-08 DIAGNOSIS — Z90.13 S/P BILATERAL MASTECTOMY: Primary | ICD-10-CM

## 2022-11-08 PROCEDURE — 99024 POSTOP FOLLOW-UP VISIT: CPT | Performed by: SURGERY

## 2022-11-08 ASSESSMENT — PAIN SCALES - GENERAL: PAINLEVEL: MILD PAIN (3)

## 2022-11-08 NOTE — PROGRESS NOTES
"PLASTICS POST-OP  This is a 27 year old trans male (he/they) with a history of gender dysphoria who presents 7 weeks post-op after a bilateral simple mastectomy with nipple graft reconstruction on 9/26/2022. He is here today with a visitor.    He states he has had difficulty managing his dermatilomania, and has been recommended to get a supplement for NAC antioxidants to help with compulsive behaviors and anxiety disorders. He will start this soon.    He also has a granuloma after a right lateral stitch popped. Now he is able to lie on his side without difficulty. However, given his skin sensitivity to dressings and topical solutions. We discussed that silicone adhesive bandaids may be a more tenable solution than regular adhesive. They are using Aquaphor and O'keeff's lotion and FirstAid Beauty.     He reports bilateral dog ears and improved range of motion. He also states that one of his dog ears may still resorb itself.     They still have some redness and swelling on the right nipple.     PE: Chest: Nice upper chest contour. Good hair growth pattern.   Good symmetry in incisions  Very small dog ears bilaterally.   Striae in interesting directions - more obliquely oriented.  Bandage-shaped \"abscess\" on the left periaxillary incision.   Incisions do not touch at midline.  Nipple grafts are 100% viable. R NG with minimal pigment. Irregular upper outer border of R NG.   Photos taken with verbal consent.     A&P: 27 year old trans male (he/they) who presents 7 weeks post-op after a bilateral simple mastectomy with nipple graft conservation on 9/26/2022.    Overall Loganne is healing well. He can gradually increase activity as tolerated. He will wait before pursuing revision surgery, as the dog ears he has are likely to re-absorb or lessen in size over the next 3-9 months. He will be provided with a silicone bandage before leaving clinic today.    He will follow up 3 months post-op to re-evaluate dog ears. Causes for " returning sooner were discussed.     Total time = 20 minutes, spent on the date of encounter doing chart review, history and physical, dressing changes, documentation, patient education, and any further activity as noted above.     This note was prepared on behalf of Anita Grant MD by Jennifer Maher (they/them), a trained medical scribe, based on my observations and the provider's statements to me.

## 2022-11-08 NOTE — NURSING NOTE
"Chief Complaint   Patient presents with     RECHECK     Gregg, is being seen today for a 6 week post-op DOS 9/26/22, skin sensitive to adhesive on the left side of chest, as reported by patient.       Vitals:    11/08/22 1039   BP: 111/74   BP Location: Left arm   Patient Position: Chair   Cuff Size: Adult Large   Pulse: 64   Temp: 99.1  F (37.3  C)   TempSrc: Oral   SpO2: 97%   Weight: 100.7 kg (222 lb)   Height: 1.651 m (5' 5\")       Body mass index is 36.94 kg/m .      Cassandra Zamora LPN    "

## 2022-11-08 NOTE — LETTER
"11/8/2022       RE: Gregg Maharaj  [facility]  [address]  [place] [address]     Dear Colleague,    Thank you for referring your patient, Gregg Maharaj, to the [facility] [place] [facility]. Please see a copy of my visit note below.    PLASTICS POST-OP  This is a 27 year old trans male (he/they) with a history of gender dysphoria who presents 7 weeks post-op after a bilateral simple mastectomy with nipple graft reconstruction on 9/26/2022. He is here today with a visitor.    He states he has had difficulty managing his dermatilomania, and has been recommended to get a supplement for NAC antioxidants to help with compulsive behaviors and anxiety disorders. He will start this soon.    He also has a granuloma after a right lateral stitch popped. Now he is able to lie on his side without difficulty. However, given his skin sensitivity to dressings and topical solutions. We discussed that silicone adhesive bandaids may be a more tenable solution than regular adhesive. They are using Aquaphor and O'keeff's lotion and FirstAid Beauty.     He reports bilateral dog ears and improved range of motion. He also states that one of his dog ears may still resorb itself.     They still have some redness and swelling on the right nipple.     PE: Chest: Nice upper chest contour. Good hair growth pattern.   Good symmetry in incisions  Very small dog ears bilaterally.   Striae in interesting directions - more obliquely oriented.  Bandage-shaped \"abscess\" on the left periaxillary incision.   Incisions do not touch at midline.  Nipple grafts are 100% viable. R NG with minimal pigment. Irregular upper outer border of R NG.   Photos taken with verbal consent.     A&P: 27 year old trans male (he/they) who presents 7 weeks post-op after a bilateral simple mastectomy with nipple graft conservation on 9/26/2022.    Overall Gregg is " healing well. He can gradually increase activity as tolerated. He will wait before pursuing revision surgery, as the dog ears he has are likely to re-absorb or lessen in size over the next 3-9 months. He will be provided with a silicone bandage before leaving clinic today.    He will follow up 3 months post-op to re-evaluate dog ears. Causes for returning sooner were discussed.     Total time = 20 minutes, spent on the date of encounter doing chart review, history and physical, dressing changes, documentation, patient education, and any further activity as noted above.     This note was prepared on behalf of Anita Grant MD by Jennifer Maher (they/them), a trained medical scribe, based on my observations and the provider's statements to me.       Sincerely,    Anita Grant MD

## 2022-11-19 ENCOUNTER — MYC MEDICAL ADVICE (OUTPATIENT)
Dept: RHEUMATOLOGY | Facility: CLINIC | Age: 27
End: 2022-11-19

## 2022-11-21 NOTE — TELEPHONE ENCOUNTER
LOV:10/31/22:    ASSESSMENT AND PLAN:  Gregg Maharaj 27 year old adult is seen here on 10/31/22 for evaluation of longstanding polyarthralgias polymyalgias going back to his grade school years.  He has hypermobility.  He is well aware of the management principles.  Positive VERNON has been noted in the past at 1: 320.  Negative dsDNA, CALEB's and complements were not low.  The likelihood that he has a systemic connective tissue disease appears to be low based on his descriptions today, review of the data going back few years, the previous to rheumatology visit notes were reviewed.  The concern around Raynaud's requires further observation I have asked him to transmit pictures of the hands, feet should he experience changes that he describes as Raynaud's.  Further management of chronic pain he is to follow-up with a primary physician.  He expressed frustration that various rheumatologist have not listened to him.  He is concerned that he might develop the symptoms of a connective tissue disease.  In that case I have reassured him that he will be seen here and rheumatology.  We will send him literature on SLE, rheumatoid arthritis, Sjogren's.  He is to follow-up here on as-needed basis.

## 2022-11-30 PROBLEM — M99.05 SOMATIC DYSFUNCTION OF PELVIS REGION: Status: RESOLVED | Noted: 2022-04-01 | Resolved: 2022-11-30

## 2022-12-15 ENCOUNTER — LAB (OUTPATIENT)
Dept: LAB | Facility: CLINIC | Age: 27
End: 2022-12-15
Payer: COMMERCIAL

## 2022-12-15 ENCOUNTER — OFFICE VISIT (OUTPATIENT)
Dept: DERMATOLOGY | Facility: CLINIC | Age: 27
End: 2022-12-15
Payer: COMMERCIAL

## 2022-12-15 DIAGNOSIS — L65.0 TELOGEN EFFLUVIUM: ICD-10-CM

## 2022-12-15 DIAGNOSIS — L65.9 NON-SCARRING ALOPECIA: Primary | ICD-10-CM

## 2022-12-15 DIAGNOSIS — L65.9 NON-SCARRING ALOPECIA: ICD-10-CM

## 2022-12-15 LAB
BASOPHILS # BLD AUTO: 0.1 10E3/UL (ref 0–0.2)
BASOPHILS NFR BLD AUTO: 1 %
EOSINOPHIL # BLD AUTO: 0.1 10E3/UL (ref 0–0.7)
EOSINOPHIL NFR BLD AUTO: 1 %
ERYTHROCYTE [DISTWIDTH] IN BLOOD BY AUTOMATED COUNT: 12.3 % (ref 10–15)
FERRITIN SERPL-MCNC: 120 NG/ML (ref 6–409)
HCT VFR BLD AUTO: 46.9 % (ref 35–53)
HGB BLD-MCNC: 15.5 G/DL (ref 11.7–17.7)
IMM GRANULOCYTES # BLD: 0 10E3/UL
IMM GRANULOCYTES NFR BLD: 1 %
IRON BINDING CAPACITY (ROCHE): 297 UG/DL (ref 240–430)
IRON SATN MFR SERPL: 38 % (ref 15–46)
IRON SERPL-MCNC: 114 UG/DL (ref 37–157)
LYMPHOCYTES # BLD AUTO: 2.6 10E3/UL (ref 0.8–5.3)
LYMPHOCYTES NFR BLD AUTO: 32 %
MCH RBC QN AUTO: 27.8 PG (ref 26.5–33)
MCHC RBC AUTO-ENTMCNC: 33 G/DL (ref 31.5–36.5)
MCV RBC AUTO: 84 FL (ref 78–100)
MONOCYTES # BLD AUTO: 0.7 10E3/UL (ref 0–1.3)
MONOCYTES NFR BLD AUTO: 8 %
NEUTROPHILS # BLD AUTO: 4.6 10E3/UL (ref 1.6–8.3)
NEUTROPHILS NFR BLD AUTO: 57 %
NRBC # BLD AUTO: 0 10E3/UL
NRBC BLD AUTO-RTO: 0 /100
PLATELET # BLD AUTO: 243 10E3/UL (ref 150–450)
RBC # BLD AUTO: 5.57 10E6/UL (ref 3.8–5.9)
TSH SERPL DL<=0.005 MIU/L-ACNC: 1.31 UIU/ML (ref 0.3–4.2)
WBC # BLD AUTO: 8 10E3/UL (ref 4–11)

## 2022-12-15 PROCEDURE — 83550 IRON BINDING TEST: CPT | Performed by: PATHOLOGY

## 2022-12-15 PROCEDURE — 99204 OFFICE O/P NEW MOD 45 MIN: CPT | Performed by: DERMATOLOGY

## 2022-12-15 PROCEDURE — 82728 ASSAY OF FERRITIN: CPT | Performed by: DERMATOLOGY

## 2022-12-15 PROCEDURE — 83540 ASSAY OF IRON: CPT | Performed by: PATHOLOGY

## 2022-12-15 PROCEDURE — 82306 VITAMIN D 25 HYDROXY: CPT | Performed by: DERMATOLOGY

## 2022-12-15 PROCEDURE — 85025 COMPLETE CBC W/AUTO DIFF WBC: CPT | Performed by: PATHOLOGY

## 2022-12-15 PROCEDURE — 84443 ASSAY THYROID STIM HORMONE: CPT | Performed by: PATHOLOGY

## 2022-12-15 PROCEDURE — 36415 COLL VENOUS BLD VENIPUNCTURE: CPT | Performed by: PATHOLOGY

## 2022-12-15 PROCEDURE — 84630 ASSAY OF ZINC: CPT | Mod: 90 | Performed by: PATHOLOGY

## 2022-12-15 PROCEDURE — 86376 MICROSOMAL ANTIBODY EACH: CPT | Performed by: DERMATOLOGY

## 2022-12-15 PROCEDURE — 99000 SPECIMEN HANDLING OFFICE-LAB: CPT | Performed by: PATHOLOGY

## 2022-12-15 ASSESSMENT — PAIN SCALES - GENERAL: PAINLEVEL: MILD PAIN (2)

## 2022-12-15 NOTE — NURSING NOTE
Chief Complaint   Patient presents with     Hair Loss     Pt here for hair loss. Pt has been seen for eczema in the past.     Jesús Lopes, EMT

## 2022-12-15 NOTE — PROGRESS NOTES
[facility] Dermatology Note  Encounter Date: Dec 15, 2022  Office Visit     Dermatology Problem List:    1. Nonscarring alopecia consistent with Telogen effluvium   Current tx: Rogaine 5% foam    ____________________________________________    Assessment & Plan:    # Nonscarring alopecia consistent with telogen effluvium. May be additional component androgenetic alopecia. Chronic, flare.   Discussed treatment options such as OTC Rogaine 5% foam and oral minoxidil.   - Start Rogaine 5% foam.   - Labs today: CBC, Ferratin, iron, thyroid, TSH, vitamin D, zinc.   - Future considerations: oral minoxidil     Procedures Performed:   None.     Follow-up: 3 month(s) in-person, or earlier for new or changing lesions    Staff and Scribe:     Scribe Disclosure:  IJami, am serving as a scribe to document services personally performed by Usama Jackson MD based on data collection and the provider's statements to me.     Provider Disclosure:   The documentation recorded by the scribe accurately reflects the services I personally performed and the decisions made by me.    Usama Jackson MD    Department of Dermatology  [facility]: Phone: [phone], Fax:[phone]  [facility]: Phone: [phone] Fax: [phone]  ____________________________________________    CC: Hair Loss (Pt here for hair loss. Pt has been seen for eczema in the past.)    HPI:  Mr. Gregg Maharaj is a(n) 27 year old adult who presents today as a return patient for hair loss follow-up. Last seen in dermatology by Monserrat Celeste PA-C, on 6/1/22, at which time patient was started on triamcinolone 0.1% ointment for treatment of hand dermatitis. Additionally patient was started on ketoconazole 2% shampoo for treatment of scalp folliculitis.     Today, patient reports they were very sick last  year and started losing hair. Patient has tried kenalog shampoo to treat hair loss.     Patient is otherwise feeling well, without additional skin concerns.    Labs Reviewed:  CBC, Ferratin, iron, thyroid, TSH, vitamin D, zinc.     Physical Exam:  Vitals: There were no vitals taken for this visit.  SKIN: Focused examination of scalp was performed.  - Decreased hair density over vertex scalp with positive hair pull test over vertex and crown  - Normal eyelashes and eyebrows.  - No other lesions of concern on areas examined.     Medications:  Current Outpatient Medications   Medication     albuterol (PROAIR HFA/PROVENTIL HFA/VENTOLIN HFA) 108 (90 Base) MCG/ACT inhaler     azelastine (ASTELIN) 0.1 % nasal spray     baclofen (LIORESAL) 10 MG tablet     buPROPion (WELLBUTRIN XL) 150 MG 24 hr tablet     cetirizine (ZYRTEC) 10 MG tablet     clindamycin-benzoyl peroxide (BENZACLIN) 1-5 % external gel     clotrimazole (LOTRIMIN) 1 % external cream     diazepam (VALIUM) 5 MG tablet     diphenhydrAMINE (BENADRYL) 25 MG capsule     EPINEPHrine (ANY BX GENERIC EQUIV) 0.3 MG/0.3ML injection 2-pack     estradiol cypionate (DEPO-ESTRADIOL) 5 MG/ML injection     famotidine (PEPCID) 20 MG tablet     gabapentin (NEURONTIN) 300 MG capsule     ibuprofen (ADVIL/MOTRIN) 200 MG capsule     ketoconazole (NIZORAL) 2 % external shampoo     loratadine (CLARITIN) 10 MG tablet     Methylphenidate HCl (RITALIN PO)     montelukast (SINGULAIR) 10 MG tablet     OTHER MEDICAL SUPPLIES     oxybutynin ER (DITROPAN XL) 5 MG 24 hr tablet     propranolol ER (INDERAL LA) 160 MG 24 hr capsule     Specialty Vitamins Products (VITAMINS FOR HAIR) CAPS     SUMAtriptan (IMITREX) 50 MG tablet     testosterone (ANDROGEL 1.62 % PUMP) 20.25 MG/ACT gel     venlafaxine (EFFEXOR-ER) 150 MG TB24 24 hr tablet     CONCERTA 54 MG CR tablet     hydrOXYzine (ATARAX) 25 MG tablet     ondansetron (ZOFRAN ODT) 4 MG ODT tab     oxyCODONE (ROXICODONE) 5 MG tablet      sulfamethoxazole-trimethoprim (BACTRIM DS) 800-160 MG tablet     triamcinolone (KENALOG) 0.1 % external ointment     Current Facility-Administered Medications   Medication     ciprofloxacin (CIPRO) tablet 500 mg      Past Medical History:   Patient Active Problem List   Diagnosis     Acute UTI     VERNON positive     Bilateral leg pain     Dysmenorrhea     Elevated CK     Mild intermittent asthma without complication     Narcolepsy and cataplexy     Panic     Urinary urgency     Constipation     Past Medical History:   Diagnosis Date     ADHD (attention deficit hyperactivity disorder)      VERNON positive      Anxiety      Asthma      Borderline personality disorder (H)      Concussion      Depression      Fibromyalgia      Gastroesophageal reflux disease with esophagitis      Gender dysphoria in adolescent and adult      Hypermobility of joint      Hypersomnia      Migraine      Mucous retention cyst of maxillary sinus      Obesity      PTSD (post-traumatic stress disorder)      Seasonal allergic rhinitis      Urinary frequency         CC [facility] [employer]  [address]  [place] [address] on close of this encounter.

## 2022-12-15 NOTE — LETTER
12/15/2022       RE: Gregg Maharaj  [address]  [address]  [place] [address]     Dear Colleague,    Thank you for referring your patient, Gregg Maharaj, to the [facility]MAT[facility] [place] [facility]. Please see a copy of my visit note below.    [facility] Dermatology Note  Encounter Date: Dec 15, 2022  Office Visit     Dermatology Problem List:    1. Nonscarring alopecia consistent with Telogen effluvium   Current tx: Rogaine 5% foam    ____________________________________________    Assessment & Plan:    # Nonscarring alopecia consistent with telogen effluvium. May be additional component androgenetic alopecia. Chronic, flare.   Discussed treatment options such as OTC Rogaine 5% foam and oral minoxidil.   - Start Rogaine 5% foam.   - Labs today: CBC, Ferratin, iron, thyroid, TSH, vitamin D, zinc.   - Future considerations: oral minoxidil     Procedures Performed:   None.     Follow-up: 3 month(s) in-person, or earlier for new or changing lesions    Staff and Scribe:     Scribe Disclosure:  I, Jami Romero, am serving as a scribe to document services personally performed by Usama Jackson MD based on data collection and the provider's statements to me.     Provider Disclosure:   The documentation recorded by the scribe accurately reflects the services I personally performed and the decisions made by me.    Usama Jackson MD    Department of Dermatology  [facility]: Phone: [phone], Fax:[phone]  [facility]: Phone: [phone] Fax: [phone]  ____________________________________________    CC: Hair Loss (Pt here for hair loss. Pt has been seen for eczema in the past.)    HPI:  Mr. Gregg Maharaj is a(n) 27 year old adult who presents today as a return patient for hair loss  follow-up. Last seen in dermatology by Monserrat Celeste PA-C, on 6/1/22, at which time patient was started on triamcinolone 0.1% ointment for treatment of hand dermatitis. Additionally patient was started on ketoconazole 2% shampoo for treatment of scalp folliculitis.     Today, patient reports they were very sick last year and started losing hair. Patient has tried kenalog shampoo to treat hair loss.     Patient is otherwise feeling well, without additional skin concerns.    Labs Reviewed:  CBC, Ferratin, iron, thyroid, TSH, vitamin D, zinc.     Physical Exam:  Vitals: There were no vitals taken for this visit.  SKIN: Focused examination of scalp was performed.  - Decreased hair density over vertex scalp with positive hair pull test over vertex and crown  - Normal eyelashes and eyebrows.  - No other lesions of concern on areas examined.     Medications:  Current Outpatient Medications   Medication     albuterol (PROAIR HFA/PROVENTIL HFA/VENTOLIN HFA) 108 (90 Base) MCG/ACT inhaler     azelastine (ASTELIN) 0.1 % nasal spray     baclofen (LIORESAL) 10 MG tablet     buPROPion (WELLBUTRIN XL) 150 MG 24 hr tablet     cetirizine (ZYRTEC) 10 MG tablet     clindamycin-benzoyl peroxide (BENZACLIN) 1-5 % external gel     clotrimazole (LOTRIMIN) 1 % external cream     diazepam (VALIUM) 5 MG tablet     diphenhydrAMINE (BENADRYL) 25 MG capsule     EPINEPHrine (ANY BX GENERIC EQUIV) 0.3 MG/0.3ML injection 2-pack     estradiol cypionate (DEPO-ESTRADIOL) 5 MG/ML injection     famotidine (PEPCID) 20 MG tablet     gabapentin (NEURONTIN) 300 MG capsule     ibuprofen (ADVIL/MOTRIN) 200 MG capsule     ketoconazole (NIZORAL) 2 % external shampoo     loratadine (CLARITIN) 10 MG tablet     Methylphenidate HCl (RITALIN PO)     montelukast (SINGULAIR) 10 MG tablet     OTHER MEDICAL SUPPLIES     oxybutynin ER (DITROPAN XL) 5 MG 24 hr tablet     propranolol ER (INDERAL LA) 160 MG 24 hr capsule     Specialty Vitamins Products (VITAMINS FOR  HAIR) CAPS     SUMAtriptan (IMITREX) 50 MG tablet     testosterone (ANDROGEL 1.62 % PUMP) 20.25 MG/ACT gel     venlafaxine (EFFEXOR-ER) 150 MG TB24 24 hr tablet     CONCERTA 54 MG CR tablet     hydrOXYzine (ATARAX) 25 MG tablet     ondansetron (ZOFRAN ODT) 4 MG ODT tab     oxyCODONE (ROXICODONE) 5 MG tablet     sulfamethoxazole-trimethoprim (BACTRIM DS) 800-160 MG tablet     triamcinolone (KENALOG) 0.1 % external ointment     Current Facility-Administered Medications   Medication     ciprofloxacin (CIPRO) tablet 500 mg      Past Medical History:   Patient Active Problem List   Diagnosis     Acute UTI     VERNON positive     Bilateral leg pain     Dysmenorrhea     Elevated CK     Mild intermittent asthma without complication     Narcolepsy and cataplexy     Panic     Urinary urgency     Constipation     Past Medical History:   Diagnosis Date     ADHD (attention deficit hyperactivity disorder)      VERNON positive      Anxiety      Asthma      Borderline personality disorder (H)      Concussion      Depression      Fibromyalgia      Gastroesophageal reflux disease with esophagitis      Gender dysphoria in adolescent and adult      Hypermobility of joint      Hypersomnia      Migraine      Mucous retention cyst of maxillary sinus      Obesity      PTSD (post-traumatic stress disorder)      Seasonal allergic rhinitis      Urinary frequency         CC Jessica Cardoso  [facility]  [address]  [place] [address] on close of this encounter.

## 2022-12-16 LAB
DEPRECATED CALCIDIOL+CALCIFEROL SERPL-MC: 24 UG/L (ref 20–75)
THYROPEROXIDASE AB SERPL-ACNC: <10 IU/ML

## 2022-12-17 LAB — ZINC SERPL-MCNC: 78 UG/DL

## 2022-12-22 DIAGNOSIS — E55.9 VITAMIN D INSUFFICIENCY: Primary | ICD-10-CM

## 2022-12-22 RX ORDER — SWAB
2 SWAB, NON-MEDICATED MISCELLANEOUS DAILY
Qty: 60 CAPSULE | Refills: 11 | Status: SHIPPED | OUTPATIENT
Start: 2022-12-22 | End: 2024-08-14

## 2023-02-06 ENCOUNTER — OFFICE VISIT (OUTPATIENT)
Dept: DERMATOLOGY | Facility: CLINIC | Age: 28
End: 2023-02-06
Payer: COMMERCIAL

## 2023-02-06 DIAGNOSIS — L70.0 ACNE VULGARIS: ICD-10-CM

## 2023-02-06 DIAGNOSIS — L24.9 IRRITANT HAND DERMATITIS: Primary | ICD-10-CM

## 2023-02-06 DIAGNOSIS — L73.9 FOLLICULITIS: ICD-10-CM

## 2023-02-06 PROCEDURE — 99214 OFFICE O/P EST MOD 30 MIN: CPT | Performed by: PHYSICIAN ASSISTANT

## 2023-02-06 RX ORDER — TRETINOIN 0.5 MG/G
CREAM TOPICAL AT BEDTIME
Qty: 45 G | Refills: 11 | Status: SHIPPED | OUTPATIENT
Start: 2023-02-06 | End: 2024-08-14 | Stop reason: ALTCHOICE

## 2023-02-06 RX ORDER — TACROLIMUS 1 MG/G
OINTMENT TOPICAL 2 TIMES DAILY
Qty: 60 G | Refills: 3 | Status: SHIPPED | OUTPATIENT
Start: 2023-02-06 | End: 2024-09-16

## 2023-02-06 RX ORDER — KETOCONAZOLE 20 MG/ML
SHAMPOO TOPICAL EVERY OTHER DAY
Qty: 120 ML | Refills: 3 | Status: SHIPPED | OUTPATIENT
Start: 2023-02-06 | End: 2023-06-06

## 2023-02-06 ASSESSMENT — PAIN SCALES - GENERAL: PAINLEVEL: NO PAIN (0)

## 2023-02-06 NOTE — PATIENT INSTRUCTIONS
Topical Retinoids  What is a topical retinoid?  These are medications that are related to Vitamin A, which are used on the skin  Examples are; Retin- A, Renova, Differin and Tazorac, tazarotene, adapalene and tretinoin  These come in the form of a cream or gel  Used for treatment of acne  How to apply?  Medication should be applied prior to bedtime  Wash face with a gentle cleanser and pat dry  Apply a pea sized amount to the entire face. Gently rub into the skin. Do not apply too close to the eyes or lips. Overuse of this medication can cause irritation and redness.   If you have affected areas on the chest or back, if prescribed by your physician you can apply another pea sized amount to this area as well.  For the first two weeks you will apply this every other night. If you have no irritation after this time you may increase to nightly use.   Should you have too much irritation with nightly use, stop the medication for a few days to calm down the irritation and then restart, beginning with use every other night. You will still see benefit for every other day application.   You may also need a facial moisturizer as well to help prevent irritation. Apply a facial moisturizer that states it is  non-comedogenic.  This can be applied 15 minutes after the application of the medication.   Side effects:  Dryness of skin  Peeling or flaking of skin  Irritation of skin  Increased chance of sunburns, protect your skin from the sunlight. Wear a wide brimmed hat and a facial sunscreen with SPF 30 UVA/UVB or higher.

## 2023-02-06 NOTE — LETTER
2/6/2023       RE: Gregg Maharaj  [address]  [address]  [place] [address]     Dear Colleague,    Thank you for referring your patient, Gregg Maharaj, to the [facility] [place] [facility]. Please see a copy of my visit note below.    [facility] Dermatology Note  Encounter Date: Feb 6, 2023  Office Visit     Dermatology Problem List:  1. Nonscarring alopecia consistent with Telogen effluvium   - Current tx: Rogaine 5% foam  2. Folliculitis  - ketoconazole 2% shampoo, tretinoin cream  3. Irritant hand dermatitis  - Current tx: tacrolimus ointment  - Prior tx: triamcinolone 0.1% ointment bid  4. Referred to genetics- family hx of EDS  ____________________________________________    Assessment & Plan:    # Irritant hand dermatitis  Did not tolerate triamcinolone due to blood glucose changes.  - Continue moisturization   - Start tacrolimus 0.1% ointment BID prn for flares    # Folliculitis, scalp and back, improving on the scalp but not under control on the back.  - Continue ketoconazole 2% shampoo every other day in the shower. Let sit x 3-5 min and rinse.   - Okay to use ketoconazole as a body wash as well  - Start tretinoin 0.05% cream to back nightly    Procedures Performed:   None    Follow-up: 6 month(s) in-person, or earlier for new or changing lesions    Staff and Scribe:     Scribe Disclosure:  I, Montez Lopez, am serving as a scribe to document services personally performed by Monserrat Celeste PA-C based on data collection and the provider's statements to me.     Provider Disclosure:   The documentation recorded by the scribe accurately reflects the services I personally performed and the decisions made by me.    All risks, benefits and alternatives were discussed with patient.  Patient is in agreement and understands the assessment and plan.  All questions were answered.  Sun Screen  Education was given.   Return to Clinic in 4 months or sooner as needed.   Monserrat Celeste PA-C   [facility]   ____________________________________________    CC: Eczema (Stopped steroid ointment, blood sugar was going crazy.  Skin has been very dry.    )    HPI:  Mr. Gregg Maharaj is a(n) 27 year old adult who presents today as a return patient for follow-up. Last seen in dermatology by Dr. Jackson on 12/15/2022, at which time patient was started on Rogaine 5% foam for treatment of nonscarring alopecia c/w telogen effluvium.    Today, patient reports that they stopped triamcinolone ointment because their glucose levels were impacted. Additionally, they report that their partner and cat had their glucose impacted as well. Has a history of nondiabetic hypoglycemia. Since stopping triamcinolone, has been using Aquaphor with nitrile gloves. Folliculitis has improved on ketoconazole. Had genetic testing for connective tissue panel and is being treated for hypermobile spectrum disorder.    Patient is otherwise feeling well, without additional skin concerns.    Labs Reviewed:  N/A    Physical Exam:  Vitals: There were no vitals taken for this visit.  SKIN: Focused examination of scalp, back, and hands was performed.  - No active scaly plaques on dorsal hands.  - There are scattered resolving perifollicular papules and pustules on the shoulders and back.  - No significant scale to the scalp.  - One resolving pustule on the scalp.  - No other lesions of concern on areas examined.     Medications:  Current Outpatient Medications   Medication     albuterol (PROAIR HFA/PROVENTIL HFA/VENTOLIN HFA) 108 (90 Base) MCG/ACT inhaler     azelastine (ASTELIN) 0.1 % nasal spray     baclofen (LIORESAL) 10 MG tablet     buPROPion (WELLBUTRIN XL) 150 MG 24 hr tablet     cetirizine (ZYRTEC) 10 MG tablet     clindamycin-benzoyl peroxide (BENZACLIN) 1-5 % external gel     clotrimazole (LOTRIMIN) 1 % external  cream     diazepam (VALIUM) 5 MG tablet     diphenhydrAMINE (BENADRYL) 25 MG capsule     EPINEPHrine (ANY BX GENERIC EQUIV) 0.3 MG/0.3ML injection 2-pack     estradiol cypionate (DEPO-ESTRADIOL) 5 MG/ML injection     famotidine (PEPCID) 20 MG tablet     gabapentin (NEURONTIN) 300 MG capsule     ibuprofen (ADVIL/MOTRIN) 200 MG capsule     ketoconazole (NIZORAL) 2 % external shampoo     loratadine (CLARITIN) 10 MG tablet     Methylphenidate HCl (RITALIN PO)     Minoxidil 5 % FOAM     montelukast (SINGULAIR) 10 MG tablet     OTHER MEDICAL SUPPLIES     oxybutynin ER (DITROPAN XL) 5 MG 24 hr tablet     propranolol ER (INDERAL LA) 160 MG 24 hr capsule     Specialty Vitamins Products (VITAMINS FOR HAIR) CAPS     SUMAtriptan (IMITREX) 50 MG tablet     testosterone (ANDROGEL 1.62 % PUMP) 20.25 MG/ACT gel     venlafaxine (EFFEXOR-ER) 150 MG TB24 24 hr tablet     vitamin D3 (CHOLECALCIFEROL) 10 MCG (400 UNIT) capsule     Current Facility-Administered Medications   Medication     ciprofloxacin (CIPRO) tablet 500 mg      Past Medical History:   Patient Active Problem List   Diagnosis     Acute UTI     VERNON positive     Bilateral leg pain     Dysmenorrhea     Elevated CK     Mild intermittent asthma without complication     Narcolepsy and cataplexy     Panic     Urinary urgency     Constipation     Past Medical History:   Diagnosis Date     ADHD (attention deficit hyperactivity disorder)      VERNON positive      Anxiety      Asthma      Borderline personality disorder (H)      Concussion      Depression      Fibromyalgia      Gastroesophageal reflux disease with esophagitis      Gender dysphoria in adolescent and adult      Hypermobility of joint      Hypersomnia      Migraine      Mucous retention cyst of maxillary sinus      Obesity      PTSD (post-traumatic stress disorder)      Seasonal allergic rhinitis      Urinary frequency         CC Jessica Toll [facility] [address]  [address]  [place]  [address] on close of this encounter.

## 2023-02-06 NOTE — PROGRESS NOTES
[facility] Dermatology Note  Encounter Date: Feb 6, 2023  Office Visit     Dermatology Problem List:  1. Nonscarring alopecia consistent with Telogen effluvium   - Current tx: Rogaine 5% foam  2. Folliculitis  - ketoconazole 2% shampoo, tretinoin cream  3. Irritant hand dermatitis  - Current tx: tacrolimus ointment  - Prior tx: triamcinolone 0.1% ointment bid  4. Referred to genetics- family hx of EDS  ____________________________________________    Assessment & Plan:    # Irritant hand dermatitis  Did not tolerate triamcinolone due to blood glucose changes.  - Continue moisturization   - Start tacrolimus 0.1% ointment BID prn for flares    # Folliculitis, scalp and back, improving on the scalp but not under control on the back.  - Continue ketoconazole 2% shampoo every other day in the shower. Let sit x 3-5 min and rinse.   - Okay to use ketoconazole as a body wash as well  - Start tretinoin 0.05% cream to back nightly    Procedures Performed:   None    Follow-up: 6 month(s) in-person, or earlier for new or changing lesions    Staff and Scribe:     Scribe Disclosure:  I, Montez Lopez, am serving as a scribe to document services personally performed by Monserrat Celeste PA-C based on data collection and the provider's statements to me.     Provider Disclosure:   The documentation recorded by the scribe accurately reflects the services I personally performed and the decisions made by me.    All risks, benefits and alternatives were discussed with patient.  Patient is in agreement and understands the assessment and plan.  All questions were answered.  Sun Screen Education was given.   Return to Clinic in 4 months or sooner as needed.   Monserrat Celeste PA-C   [facility]   ____________________________________________    CC: Eczema (Stopped steroid ointment, blood sugar was going crazy.  Skin has been very dry.    )    HPI:  Mr. Gregg MOSELEY Nicko is an 27  year old adult who presents today as a return patient for follow-up. Last seen in dermatology by Dr. Jackson on 12/15/2022, at which time patient was started on Rogaine 5% foam for treatment of nonscarring alopecia c/w telogen effluvium.    Today, patient reports that they stopped triamcinolone ointment because their glucose levels were impacted. Additionally, they report that their partner and cat had their glucose impacted as well. Has a history of nondiabetic hypoglycemia. Since stopping triamcinolone, has been using Aquaphor with nitrile gloves. Folliculitis has improved on ketoconazole. Had genetic testing for connective tissue panel and is being treated for hypermobile spectrum disorder.    Patient is otherwise feeling well, without additional skin concerns.    Labs Reviewed:  N/A    Physical Exam:  Vitals: There were no vitals taken for this visit.  SKIN: Focused examination of scalp, back, and hands was performed.  - No active scaly plaques on dorsal hands.  - There are scattered resolving perifollicular papules and pustules on the shoulders and back.  - No significant scale to the scalp.  - One resolving pustule on the scalp.  - No other lesions of concern on areas examined.     Medications:  Current Outpatient Medications   Medication     albuterol (PROAIR HFA/PROVENTIL HFA/VENTOLIN HFA) 108 (90 Base) MCG/ACT inhaler     azelastine (ASTELIN) 0.1 % nasal spray     baclofen (LIORESAL) 10 MG tablet     buPROPion (WELLBUTRIN XL) 150 MG 24 hr tablet     cetirizine (ZYRTEC) 10 MG tablet     clindamycin-benzoyl peroxide (BENZACLIN) 1-5 % external gel     clotrimazole (LOTRIMIN) 1 % external cream     diazepam (VALIUM) 5 MG tablet     diphenhydrAMINE (BENADRYL) 25 MG capsule     EPINEPHrine (ANY BX GENERIC EQUIV) 0.3 MG/0.3ML injection 2-pack     estradiol cypionate (DEPO-ESTRADIOL) 5 MG/ML injection     famotidine (PEPCID) 20 MG tablet     gabapentin (NEURONTIN) 300 MG capsule     ibuprofen (ADVIL/MOTRIN) 200 MG  capsule     ketoconazole (NIZORAL) 2 % external shampoo     loratadine (CLARITIN) 10 MG tablet     Methylphenidate HCl (RITALIN PO)     Minoxidil 5 % FOAM     montelukast (SINGULAIR) 10 MG tablet     OTHER MEDICAL SUPPLIES     oxybutynin ER (DITROPAN XL) 5 MG 24 hr tablet     propranolol ER (INDERAL LA) 160 MG 24 hr capsule     Specialty Vitamins Products (VITAMINS FOR HAIR) CAPS     SUMAtriptan (IMITREX) 50 MG tablet     testosterone (ANDROGEL 1.62 % PUMP) 20.25 MG/ACT gel     venlafaxine (EFFEXOR-ER) 150 MG TB24 24 hr tablet     vitamin D3 (CHOLECALCIFEROL) 10 MCG (400 UNIT) capsule     Current Facility-Administered Medications   Medication     ciprofloxacin (CIPRO) tablet 500 mg      Past Medical History:   Patient Active Problem List   Diagnosis     Acute UTI     VERNON positive     Bilateral leg pain     Dysmenorrhea     Elevated CK     Mild intermittent asthma without complication     Narcolepsy and cataplexy     Panic     Urinary urgency     Constipation     Past Medical History:   Diagnosis Date     ADHD (attention deficit hyperactivity disorder)      VERNON positive      Anxiety      Asthma      Borderline personality disorder (H)      Concussion      Depression      Fibromyalgia      Gastroesophageal reflux disease with esophagitis      Gender dysphoria in adolescent and adult      Hypermobility of joint      Hypersomnia      Migraine      Mucous retention cyst of maxillary sinus      Obesity      PTSD (post-traumatic stress disorder)      Seasonal allergic rhinitis      Urinary frequency         CC [facility]  [id number]  [place] [address] on close of this encounter.

## 2023-02-06 NOTE — NURSING NOTE
Dermatology Rooming Note    Gregg Maharaj's goals for this visit include:   Chief Complaint   Patient presents with     Eczema     Stopped steroid ointment, blood sugar was going crazy.  Skin has been very dry.         Micaela Pierce, CMA

## 2023-03-21 NOTE — TELEPHONE ENCOUNTER
RECORDS RECEIVED FROM: internal /ce    DATE RECEIVED: 6.5.23    NOTES (FOR ALL VISITS) STATUS DETAILS   OFFICE NOTES from referring provider internal       MEDICATION LIST internal     IMAGING        MRI (BRAIN) ce [facility]- 8.10.22   XR (Chest) ce [facility]- 12.8.21, 12.3.21    CT (HEAD/NECK/CHEST/ABDOMEN) ce [facility]- 8/9/22     LABS     DIABETES: HBGA1C, CREATININE, FASTING LIPIDS, MICROALBUMIN URINE, POTASSIUM, TSH, T4    THYROID: TSH, T4, CBC, THYRODLONULIN, TOTAL T3, FREE T4, CALCITONIN, CEA internal /ce Cortisol total- 3.13.23  Cbc- 12.15.22  Thyroid peroxid- 12.15.22  TSH/T4- 3.13.23  Vitamin D- 12.15.22  Glucose meter- 3.13.23   Potassium- 8/9/22  HBGA1C- 3.13.23

## 2023-03-22 NOTE — PROGRESS NOTES
[facility] Dermatology Note  Encounter Date: Mar 23, 2023  Office Visit     Dermatology Problem List:  1. Nonscarring alopecia consistent with Telogen effluvium   - Current tx: minoxidil 1.25 mg daily   - Prior offered: Rogaine 5% foam, not covered by insurance.   2. Folliculitis  - ketoconazole 2% shampoo, tretinoin cream  3. Irritant hand dermatitis  - Current tx: tacrolimus ointment  - Prior tx: triamcinolone 0.1% ointment bid  4. Referred to genetics- family hx of EDS  ____________________________________________    Assessment & Plan:    # Nonscarring alopecia consistent with telogen effluvium. Chronic, flare.   Prescribed Rogaine foam at last visit, was not covered by insurance. Discussed treatment oral minoxidil; patient would like to be started on the lowest dose with concern for low/labile blood pressure. No longer losing hair, notes mild regrowth.   - Start minoxidil 1.25 mg daily   - Future considerations: if tolerates current dose, could consider increasing to 2.5 mg     # Dermatitis of scalp and eyebrows, seborrheic   Patient has intermittent erythematous and scaling patches on the base of scalp and bilateral temples. Has not tolerated topical steroid in the past, would prefer a different option if available.   - Will start calcipotriene drops   - Continue keto shampoo    Follow-up: 3 month(s) in-person, or earlier for new or changing lesions    Staff and Scribe: staffed with attending physician, Dr. Jackson     Scribe Disclosure:  I, Chintan Greco, am serving as a scribe to document services personally performed by Usama Jackson MD based on data collection and the provider's statements to me.     Ceci Mendiola MD  [facility], PGY-2     Provider Disclosure:   The documentation recorded by the scribe accurately reflects the services I personally performed and the decisions made by me.    Staff Physician Comments:   I saw and evaluated the patient with the  resident and I agree with the assessment and plan.  I was present for the examination.    Usama Jackson MD  Pronouns: he/him/his    Department of Dermatology  [facility]: Phone: [phone], Fax:[phone]  [facility]: Phone: [phone] Fax: [phone]  ____________________________________________    CC: Derm Problem (Gregg is following up for alopecia. Insurance would not cover rogaine. Would like to try the oral minoxidil. Thinning has remained the same since last visit.)    HPI:  Mr. Gregg Maharaj is a(n) 27 year old adult who presents today as a return patient for hair loss and multiple scalp concerns. Last seen in dermatology by Monserrat Celeste PA-C on 2/6/2023, at which time patient was started on tacrolimus 0.1% ointment for treatment of irritant hand dermatitis and tretinoin 0.05% cream for treatment of folliculitis on the scalp and back. Also seen on 12/15/22 for nonscarring alopecia consistent with telogen effluvium, prescribed Rogaine.    1. Hair loss  - Patient started on Rogaine at last visit on 12/15/22  - The Rogaine was not covered by insurance, so it was not started  - Patient has noticed growth of baby hairs growing all over, notes that he is losing less hair.   - Patient notes that this was not androgenic because hormones were normal   - Patient notes new red patches of skin at the hairline towards the neck and temple; unsure how long they last when they do appear.   - Has tried moisturizing, has not helped.   - Concerned with diffuse itching of the scalp as well. It has been present but notes that the intensity has increased.   - Patient has a history of eczema; all over but usually appears on the hands.     2. Folliculitis of scalp and back   - Using ketoconazole shampoo, has been using for about a year.   - Thinks that using it on his back is helping; also  using tretinoin cream on the back.      Patient is otherwise feeling well, without additional skin concerns.    Labs Reviewed:  N/A    Physical Exam:  Vitals: There were no vitals taken for this visit.  SKIN: Focused examination of scalp was performed.  - There is macular erythema of the scalp with mild flaky white scale.   - Hair pull test is negative.   - No other lesions of concern on areas examined.     Medications:  Current Outpatient Medications   Medication     albuterol (PROAIR HFA/PROVENTIL HFA/VENTOLIN HFA) 108 (90 Base) MCG/ACT inhaler     azelastine (ASTELIN) 0.1 % nasal spray     baclofen (LIORESAL) 10 MG tablet     buPROPion (WELLBUTRIN XL) 150 MG 24 hr tablet     calcipotriene (DOVONOX) 0.005 % external solution     cetirizine (ZYRTEC) 10 MG tablet     clindamycin-benzoyl peroxide (BENZACLIN) 1-5 % external gel     clotrimazole (LOTRIMIN) 1 % external cream     diazepam (VALIUM) 5 MG tablet     diphenhydrAMINE (BENADRYL) 25 MG capsule     EPINEPHrine (ANY BX GENERIC EQUIV) 0.3 MG/0.3ML injection 2-pack     estradiol cypionate (DEPO-ESTRADIOL) 5 MG/ML injection     famotidine (PEPCID) 20 MG tablet     gabapentin (NEURONTIN) 300 MG capsule     ibuprofen (ADVIL/MOTRIN) 200 MG capsule     ketoconazole (NIZORAL) 2 % external shampoo     loratadine (CLARITIN) 10 MG tablet     Methylphenidate HCl (RITALIN PO)     minoxidil (LONITEN) 2.5 MG tablet     montelukast (SINGULAIR) 10 MG tablet     oxybutynin ER (DITROPAN XL) 5 MG 24 hr tablet     propranolol ER (INDERAL LA) 160 MG 24 hr capsule     Specialty Vitamins Products (VITAMINS FOR HAIR) CAPS     tacrolimus (PROTOPIC) 0.1 % external ointment     testosterone (ANDROGEL 1.62 % PUMP) 20.25 MG/ACT gel     tretinoin (RETIN-A) 0.05 % external cream     venlafaxine (EFFEXOR-ER) 150 MG TB24 24 hr tablet     vitamin D3 (CHOLECALCIFEROL) 10 MCG (400 UNIT) capsule     CONCERTA 54 MG CR tablet     Minoxidil 5 % FOAM     OTHER MEDICAL SUPPLIES     SUMAtriptan (IMITREX)  50 MG tablet     Current Facility-Administered Medications   Medication     ciprofloxacin (CIPRO) tablet 500 mg      Past Medical History:   Patient Active Problem List   Diagnosis     Acute UTI     VERNON positive     Bilateral leg pain     Dysmenorrhea     Elevated CK     Mild intermittent asthma without complication     Narcolepsy and cataplexy     Panic     Urinary urgency     Constipation     Past Medical History:   Diagnosis Date     ADHD (attention deficit hyperactivity disorder)      VERNON positive      Anxiety      Asthma      Borderline personality disorder (H)      Concussion      Depression      Fibromyalgia      Gastroesophageal reflux disease with esophagitis      Gender dysphoria in adolescent and adult      Hypermobility of joint      Hypersomnia      Migraine      Mucous retention cyst of maxillary sinus      Obesity      PTSD (post-traumatic stress disorder)      Seasonal allergic rhinitis      Urinary frequency         CC Jessica [facility] [address]  [address]  [place] [address] on close of this encounter.

## 2023-03-23 ENCOUNTER — OFFICE VISIT (OUTPATIENT)
Dept: DERMATOLOGY | Facility: CLINIC | Age: 28
End: 2023-03-23
Payer: COMMERCIAL

## 2023-03-23 DIAGNOSIS — L21.9 DERMATITIS, SEBORRHEIC: ICD-10-CM

## 2023-03-23 DIAGNOSIS — L65.9 NON-SCARRING ALOPECIA: Primary | ICD-10-CM

## 2023-03-23 PROCEDURE — 99214 OFFICE O/P EST MOD 30 MIN: CPT | Mod: GC | Performed by: DERMATOLOGY

## 2023-03-23 RX ORDER — MINOXIDIL 2.5 MG/1
TABLET ORAL
Qty: 45 TABLET | Refills: 3 | Status: SHIPPED | OUTPATIENT
Start: 2023-03-23 | End: 2024-08-14

## 2023-03-23 RX ORDER — METHYLPHENIDATE HYDROCHLORIDE 54 MG/1
TABLET, EXTENDED RELEASE ORAL
COMMUNITY
Start: 2023-03-16

## 2023-03-23 RX ORDER — CALCIPOTRIENE 0.05 MG/ML
SOLUTION TOPICAL
Qty: 60 ML | Refills: 11 | Status: SHIPPED | OUTPATIENT
Start: 2023-03-23 | End: 2024-08-14

## 2023-03-23 NOTE — LETTER
3/23/2023       RE: Gregg Maharaj  [address]  [address]  [place] [address]     Dear Colleague,    Thank you for referring your patient, Gregg Maharaj, to the [facility] [place] [facility]. Please see a copy of my visit note below.    [facility] Dermatology Note  Encounter Date: Mar 23, 2023  Office Visit     Dermatology Problem List:  1. Nonscarring alopecia consistent with Telogen effluvium   - Current tx: minoxidil 1.25 mg daily   - Prior offered: Rogaine 5% foam, not covered by insurance.   2. Folliculitis  - ketoconazole 2% shampoo, tretinoin cream  3. Irritant hand dermatitis  - Current tx: tacrolimus ointment  - Prior tx: triamcinolone 0.1% ointment bid  4. Referred to genetics- family hx of EDS  ____________________________________________    Assessment & Plan:    # Nonscarring alopecia consistent with telogen effluvium. Chronic, flare.   Prescribed Rogaine foam at last visit, was not covered by insurance. Discussed treatment oral minoxidil; patient would like to be started on the lowest dose with concern for low/labile blood pressure. No longer losing hair, notes mild regrowth.   - Start minoxidil 1.25 mg daily   - Future considerations: if tolerates current dose, could consider increasing to 2.5 mg     # Dermatitis of scalp and eyebrows, seborrheic   Patient has intermittent erythematous and scaling patches on the base of scalp and bilateral temples. Has not tolerated topical steroid in the past, would prefer a different option if available.   - Will start calcipotriene drops   - Continue keto shampoo    Follow-up: 3 month(s) in-person, or earlier for new or changing lesions    Staff and Scribe: staffed with attending physician, Dr. Manuel Madden Disclosure:  Chintan SALAZAR, am serving as a scribe to document services personally performed by Usama Jackson MD based on data collection and the  provider's statements to me.     Ceci Mendiola MD  [facility], PGY-2     Provider Disclosure:   The documentation recorded by the scribe accurately reflects the services I personally performed and the decisions made by me.    Staff Physician Comments:   I saw and evaluated the patient with the resident and I agree with the assessment and plan.  I was present for the examination.    Usama Jackson MD  Pronouns: he/him/his    Department of Dermatology  [facility]: Phone: [phone], Fax:[phone]  [facility]: Phone: [phone] Fax: [phone]  ____________________________________________    CC: Derm Problem (Gregg is following up for alopecia. Insurance would not cover rogaine. Would like to try the oral minoxidil. Thinning has remained the same since last visit.)    HPI:  Mr. Gregg Maharaj is a(n) 27 year old adult who presents today as a return patient for hair loss and multiple scalp concerns. Last seen in dermatology by Monserrat Celeste PA-C on 2/6/2023, at which time patient was started on tacrolimus 0.1% ointment for treatment of irritant hand dermatitis and tretinoin 0.05% cream for treatment of folliculitis on the scalp and back. Also seen on 12/15/22 for nonscarring alopecia consistent with telogen effluvium, prescribed Rogaine.    1. Hair loss  - Patient started on Rogaine at last visit on 12/15/22  - The Rogaine was not covered by insurance, so it was not started  - Patient has noticed growth of baby hairs growing all over, notes that he is losing less hair.   - Patient notes that this was not androgenic because hormones were normal   - Patient notes new red patches of skin at the hairline towards the neck and temple; unsure how long they last when they do appear.   - Has tried moisturizing, has not helped.   - Concerned with diffuse itching  of the scalp as well. It has been present but notes that the intensity has increased.   - Patient has a history of eczema; all over but usually appears on the hands.     2. Folliculitis of scalp and back   - Using ketoconazole shampoo, has been using for about a year.   - Thinks that using it on his back is helping; also using tretinoin cream on the back.      Patient is otherwise feeling well, without additional skin concerns.    Labs Reviewed:  N/A    Physical Exam:  Vitals: There were no vitals taken for this visit.  SKIN: Focused examination of scalp was performed.  - There is macular erythema of the scalp with mild flaky white scale.   - Hair pull test is negative.   - No other lesions of concern on areas examined.     Medications:  Current Outpatient Medications   Medication     albuterol (PROAIR HFA/PROVENTIL HFA/VENTOLIN HFA) 108 (90 Base) MCG/ACT inhaler     azelastine (ASTELIN) 0.1 % nasal spray     baclofen (LIORESAL) 10 MG tablet     buPROPion (WELLBUTRIN XL) 150 MG 24 hr tablet     calcipotriene (DOVONOX) 0.005 % external solution     cetirizine (ZYRTEC) 10 MG tablet     clindamycin-benzoyl peroxide (BENZACLIN) 1-5 % external gel     clotrimazole (LOTRIMIN) 1 % external cream     diazepam (VALIUM) 5 MG tablet     diphenhydrAMINE (BENADRYL) 25 MG capsule     EPINEPHrine (ANY BX GENERIC EQUIV) 0.3 MG/0.3ML injection 2-pack     estradiol cypionate (DEPO-ESTRADIOL) 5 MG/ML injection     famotidine (PEPCID) 20 MG tablet     gabapentin (NEURONTIN) 300 MG capsule     ibuprofen (ADVIL/MOTRIN) 200 MG capsule     ketoconazole (NIZORAL) 2 % external shampoo     loratadine (CLARITIN) 10 MG tablet     Methylphenidate HCl (RITALIN PO)     minoxidil (LONITEN) 2.5 MG tablet     montelukast (SINGULAIR) 10 MG tablet     oxybutynin ER (DITROPAN XL) 5 MG 24 hr tablet     propranolol ER (INDERAL LA) 160 MG 24 hr capsule     Specialty Vitamins Products (VITAMINS FOR HAIR) CAPS     tacrolimus (PROTOPIC) 0.1 % external  ointment     testosterone (ANDROGEL 1.62 % PUMP) 20.25 MG/ACT gel     tretinoin (RETIN-A) 0.05 % external cream     venlafaxine (EFFEXOR-ER) 150 MG TB24 24 hr tablet     vitamin D3 (CHOLECALCIFEROL) 10 MCG (400 UNIT) capsule     CONCERTA 54 MG CR tablet     Minoxidil 5 % FOAM     OTHER MEDICAL SUPPLIES     SUMAtriptan (IMITREX) 50 MG tablet     Current Facility-Administered Medications   Medication     ciprofloxacin (CIPRO) tablet 500 mg      Past Medical History:   Patient Active Problem List   Diagnosis     Acute UTI     VERNON positive     Bilateral leg pain     Dysmenorrhea     Elevated CK     Mild intermittent asthma without complication     Narcolepsy and cataplexy     Panic     Urinary urgency     Constipation     Past Medical History:   Diagnosis Date     ADHD (attention deficit hyperactivity disorder)      VERNON positive      Anxiety      Asthma      Borderline personality disorder (H)      Concussion      Depression      Fibromyalgia      Gastroesophageal reflux disease with esophagitis      Gender dysphoria in adolescent and adult      Hypermobility of joint      Hypersomnia      Migraine      Mucous retention cyst of maxillary sinus      Obesity      PTSD (post-traumatic stress disorder)      Seasonal allergic rhinitis      Urinary frequency         CC Jessica Toll [facility] [address] [facility]  [address]  [place] [address] on close of this encounter.

## 2023-03-23 NOTE — NURSING NOTE
Dermatology Rooming Note    Gregg Maharaj's goals for this visit include:   Chief Complaint   Patient presents with     Derm Problem     Gregg is following up for alopecia. Insurance would not cover rogaine. Would like to try the oral minoxidil. Thinning has remained the same since last visit.

## 2023-03-27 ENCOUNTER — TRANSCRIBE ORDERS (OUTPATIENT)
Dept: OTHER | Age: 28
End: 2023-03-27

## 2023-03-27 DIAGNOSIS — E16.2 HYPOGLYCEMIA: Primary | ICD-10-CM

## 2023-03-30 NOTE — TELEPHONE ENCOUNTER
RECORDS RECEIVED FROM: internal /ce    DATE RECEIVED: 4.3.23    NOTES (FOR ALL VISITS) STATUS DETAILS   OFFICE NOTES from referring provider internal         MEDICATION LIST internal      IMAGING           MRI (BRAIN) ce [facility]- 8.10.22   XR (Chest) ce [facility]- 12.8.21, 12.3.21    CT (HEAD/NECK/CHEST/ABDOMEN) ce [facility]- 8/9/22      LABS       DIABETES: HBGA1C, CREATININE, FASTING LIPIDS, MICROALBUMIN URINE, POTASSIUM, TSH, T4     THYROID: TSH, T4, CBC, THYRODLONULIN, TOTAL T3, FREE T4, CALCITONIN, CEA internal /ce Cortisol total- 3.13.23  Cbc- 12.15.22  Thyroid peroxid- 12.15.22  TSH/T4- 3.13.23  Vitamin D- 12.15.22  Glucose meter- 3.13.23   Potassium- 8/9/22  HBGA1C- 3.13.23

## 2023-03-31 ENCOUNTER — TELEPHONE (OUTPATIENT)
Dept: ENDOCRINOLOGY | Facility: CLINIC | Age: 28
End: 2023-03-31
Payer: COMMERCIAL

## 2023-03-31 NOTE — TELEPHONE ENCOUNTER
Called patient and left voicemail. Patient has appointment on 4/3/23. Need to collect 14 days of blood sugar readings if patient is using meter, or if patient is using CGM, need to get patients device uploaded to retrieve report for provider to review.  Adalgisa Maldonado MA

## 2023-03-31 NOTE — PROGRESS NOTES
"Outcome for 03/31/23 10:46 AM: [employer] message sent  Adalgisa JOSEF Maldonado  Outcome for 03/31/23 10:47 AM: Left Voicemail   Adalgisa JOSEF Maldonado  Outcome for 03/31/23 2:49 PM: Data obtained via [employer] website  Adalgisa PosadaJOSEF black     Start time: 0806  End time: 0855  Provider location: off site- home  Patient location: off site- home.      HPI:   Gregg is a very pleasant 28 yo transgender male here with his partner, Yuan, for evaluation of hypoglycemia.  He also has a history of fibromyalgia, connective tissue disorder, asthma, narcolepsy, pelvic floor dysfunction, POTS, and remote history of an eating disorder. Patient is seen by his primary care provider, Dr. Jessica Cardoso and has had a recent thorough evaluation by Dr. Heredia, endocrinologist through [facility], for the same issue.  I have reviewed the notes and labs from these recent visits.  Gregg reports struggling with hypoglycemia all of his life, since he was a toddler.  He reports that his mother also has similar episodes of hypoglycemia.    Gregg reports that low blood sugars usually happen about 3 hours after eating, but sometimes happen when he is fasting.  He does note that it seems to be worse after eating higher sugar foods and drinks, which he has been working on.   Gregg reports that sometimes he has severe nightmares, which seem to correlate with low blood sugars on his miranda sensor (glucose values in the upper 50's-60's).  He has confirmed these values on his partner's glucose meter (partner has type 2 dm).  He has a low alert set at 70 mg/dL and will eat or drink when he hears the alarm.  This happens a few times a week.  He often has to drink gatorade in the night to avoid low blood sugars. He has never had severe hypoglycemia, but does report feeling too weak to treat himself, so partner helps occasionally.  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     Gregg reports that in high school he became severely underweight.  He was very weak and lethargic and was down to 117#.  This happened after starting methylphenidate.  He recalls often being quite shaky, which he attributes to low blood sugars.  His appetite was so poor, he had to rely on protein shakes.  He thinks he may have had an eating disorder tied to concepts of self worth, and has been hesitant to meet with a dietitian because of this history.     Current diet:    Since he met with Dr. Heredia, he has cut back his soda consumption significantly and is now down to 1 can per day (was previously 3).   He is trying to eat a healthier diet, but says that this is quite limited by his finances/SNAP benefits. Tries to have high protein- usually tries to make a meal with a lot of meat at least once a day.  Meat or eggs.  High protein snacks such as protein bars, jerky, nuts, bowl of rice with seasoning, soy sauce, eggs.  Limited vegetables, spinach. Takes fiber gummies. Tries to eat whole wheat instead of white. Wake up: breakfast corn dogs or pop tarts, tatiana bars. Something easy to make, as he has low energy.      Very limited activity due to connective tissue disorder.     He feels his labs were inaccurate as he was on biotin when labs were performed.  He would like to repeat these.  He also would like imaging to be sure he does not have an insulinoma.    Gregg has been dealing with chronic stress well over a year.  He is struggling with his housing situation.      Recent glucose is as follows:               Past Medical History:   Diagnosis Date     ADHD (attention deficit hyperactivity disorder)      VERNON positive      Anxiety      Asthma      Borderline personality disorder (H)      Concussion      Depression      Fibromyalgia      Gastroesophageal reflux disease with esophagitis      Gender dysphoria in adolescent and adult      Hypermobility of joint       Hypersomnia      Migraine      Mucous retention cyst of maxillary sinus      Obesity      PTSD (post-traumatic stress disorder)      Seasonal allergic rhinitis      Urinary frequency        Past Surgical History:   Procedure Laterality Date     Direct microlaryngoscopy with biopsy  06/29/2022     TRANSGENDER MASTECTOMY Bilateral 9/26/2022    Procedure: MASTECTOMY, BILATERAL, SIMPLE, nipple grafts. OnQ;  Surgeon: Anita Grant MD;  Location: [facility] OR       Family History   Problem Relation Age of Onset     Anesthesia Reaction No family hx of      Deep Vein Thrombosis (DVT) No family hx of    Mom- has hypoglycemia, pituitary tumors.    Grandfather- type 2 DM.         Social History   Social Hx: Lives with partner, Delta.  On disability currently.     Socioeconomic History     Marital status: Single   Tobacco Use     Smoking status: Never     Smokeless tobacco: Never       Current Outpatient Medications   Medication     albuterol (PROAIR HFA/PROVENTIL HFA/VENTOLIN HFA) 108 (90 Base) MCG/ACT inhaler     azelastine (ASTELIN) 0.1 % nasal spray     baclofen (LIORESAL) 10 MG tablet     buPROPion (WELLBUTRIN XL) 150 MG 24 hr tablet     calcipotriene (DOVONOX) 0.005 % external solution     cetirizine (ZYRTEC) 10 MG tablet     clindamycin-benzoyl peroxide (BENZACLIN) 1-5 % external gel     clotrimazole (LOTRIMIN) 1 % external cream     CONCERTA 54 MG CR tablet     diazepam (VALIUM) 5 MG tablet     diphenhydrAMINE (BENADRYL) 25 MG capsule     EPINEPHrine (ANY BX GENERIC EQUIV) 0.3 MG/0.3ML injection 2-pack     estradiol cypionate (DEPO-ESTRADIOL) 5 MG/ML injection     famotidine (PEPCID) 20 MG tablet     gabapentin (NEURONTIN) 300 MG capsule     ibuprofen (ADVIL/MOTRIN) 200 MG capsule     ketoconazole (NIZORAL) 2 % external shampoo     loratadine (CLARITIN) 10 MG tablet     Methylphenidate HCl (RITALIN PO)     minoxidil (LONITEN) 2.5 MG tablet     Minoxidil 5 % FOAM     montelukast (SINGULAIR) 10 MG tablet     OTHER  MEDICAL SUPPLIES     oxybutynin ER (DITROPAN XL) 5 MG 24 hr tablet     propranolol ER (INDERAL LA) 160 MG 24 hr capsule     Specialty Vitamins Products (VITAMINS FOR HAIR) CAPS     SUMAtriptan (IMITREX) 50 MG tablet     tacrolimus (PROTOPIC) 0.1 % external ointment     testosterone (ANDROGEL 1.62 % PUMP) 20.25 MG/ACT gel     tretinoin (RETIN-A) 0.05 % external cream     venlafaxine (EFFEXOR-ER) 150 MG TB24 24 hr tablet     vitamin D3 (CHOLECALCIFEROL) 10 MCG (400 UNIT) capsule     Current Facility-Administered Medications   Medication     ciprofloxacin (CIPRO) tablet 500 mg          Allergies   Allergen Reactions     Capsaicin Anaphylaxis     Capsicum Annuum Extract & Derivative (Bell Pepper) [Capsicum] Anaphylaxis     Food Shortness Of Breath     Red chili peppers (harissa paste)     No Clinical Screening - See Comments Anaphylaxis, Dermatitis, Hives, Itching, Rash and Shortness Of Breath     Cotton seed oil  Cotton seed oil     Adhesive Tape      Other reaction(s): Atopic Dermatitis  Other reaction(s): Atopic Dermatitis  Other reaction(s): Atopic Dermatitis     Other Environmental Allergy Hives     Cotton seed oil     Metoclopramide Anxiety     Makes pt feel claustrophobic       Physical Exam  There were no vitals taken for this visit.  GENERAL: healthy, alert and no distress  RESP: no audible wheeze, cough, or visible cyanosis.  No visible retractions or increased work of breathing.  Able to speak fully in complete sentences.  PSYCH: mentation appears normal, affect normal/bright, judgement and insight intact, normal speech and appearance well-groomed    RESULTS  No results found for: A1C, HEMOGLOBINA1    TSH   Date Value Ref Range Status   12/15/2022 1.31 0.30 - 4.20 uIU/mL Final       No results found for: ALT]    No results for input(s): CHOL, HDL, LDL, TRIG, CHOLHDLRATIO in the last 46125 hours.    No results found for: NA   No results found for: POTASSIUM  No results found for: CHLORIDE  No results found  for: JASMINE  No results found for: CO2  No results found for: BUN  Lab Results   Component Value Date    CR 0.8 02/14/2022       GFR, ESTIMATED POCT   Date Value Ref Range Status   02/14/2022 >60 >60 mL/min/1.73m2 Final     Comment:     GFR not calculated when sex unspecified or nonbinary.     No results found for: GFRESTBLACK    No results found for: MICROL  No results found for: MICROALBUMIN  No results found for: CPEPT, GADAB, ISCAB    No results found for: B12]    Most recent eye exam date: : Not Found     Labs: Care Everywhere:          (ABNORMAL) GLUCOSE, FASTING (03/13/2023 8:20 AM CDT)  Lab Results - (ABNORMAL) GLUCOSE, FASTING (03/13/2023 8:20 AM CDT)  Component Value Ref Range Test Method Analysis Time Performed At Pathologist Signature   GLUCOSE 105 (H) 70 - 99 mg/dL   03/13/2023 8:43 AM CDT [facility]       (ABNORMAL) INSULIN (03/13/2023 8:20 AM CDT)  Lab Results - (ABNORMAL) INSULIN (03/13/2023 8:20 AM CDT)  Component Value Ref Range Test Method Analysis Time Performed At Pathologist Signature   INSULIN,SERUM 29.8 (H) 2.6 - 24.9 uIU/mL   03/13/2023 4:53 PM CDT [facility]       Lab Results - (ABNORMAL) INSULIN (03/13/2023 8:20 AM CDT)  Specimen (Source) Anatomical Location / Laterality Collection Method / Volume Collection Time Received Time   Blood BLOOD SPECIMEN / Unknown Venipuncture / Unknown 03/13/2023 8:20 AM CDT 03/13/2023 8:24 AM CDT     Lab Results - (ABNORMAL) INSULIN (03/13/2023 8:20 AM CDT)  Narrative   [facility] - 03/13/2023 4:53 PM CDT    Biotin supplements may cause clinically significant interference for this test assay.  If interference is suspected, it is strongly recommended that biotin is discontinued for at least one week prior to retesting.          HEMOGLOBIN A1C SCREENING (03/13/2023 8:20 AM CDT)  Lab Results - HEMOGLOBIN A1C SCREENING (03/13/2023 8:20 AM CDT)  Component Value Ref Range Test  Method Analysis Time Performed At [facility]   HEMOGLOBIN A1C SCREENING 5.4 <=6.4 %   03/13/2023 4:28 PM CDT [facility]          LC THYROXINE (T4) FREE DIRECT (03/13/2023 8:20 AM CDT)  Lab Results - LC THYROXINE (T4) FREE DIRECT (03/13/2023 8:20 AM CDT)  Component Value Ref Range Test Method Analysis Time Performed At [facility]   T4Free(Direct) 1.22 0.82 - 1.77 ng/dL   03/15/2023 12:08 PM CDT [facility]            LC TSH (03/13/2023 8:20 AM CDT)  Lab Results -  TSH (03/13/2023 8:20 AM CDT)  Component Value Ref Range Test Method Analysis Time Performed At [facility]   TSH 2.580 0.450 - 4.500 uIU/mL   03/15/2023 12:08 PM CDT [facility]               Assessment/Plan:   1.  Hypoglycemia- Patient has been bothered by symptoms of irritability, shakiness, weakness with blood glucose readings in the 50-60's and feels better after treatment.  Recent laboratory results are reassuring and he does not give a history concerning for insulinoma (no life-threatening hypoglycemia, loss of consciousness, etc). Negative screen for adrenal insufficiency and secondary hypothyroidism.  He does have fasting hyperinsulinemia, which may be an early sign of pre-diabetes.  He would like to repeat his labs, as he was previously taking biotin when he had them done.  Assuming labs return similarly, I explained it is likely the high refined carbohydrates that are stimulating a siginficant insulin response, which in turn is driving his glucose lower.  I did reassure him that young, healthy individuals often have glucose values in the 50's with no concerns.  We did discuss the importance of seeing a dietitian to help him come up with an eating plan that would allow for more glucose stability (higher fiber, more plant-based proteins, fats, etc), but this would also need to fit into his budget..   We discussed his hesitancy to see a dietitian with his eating disorder history, and I did suggest a referral to an eating disorder specialist.  He will check with his insurance on this.  I also suggested a referral to Carla Cunningham, health psychologist, as patient has multiple medical concerns and is under a great deal of stress.  He is interested in this.     We also briefly discussed the option of using low dose metformin to help stabilize glucose and minimize rises, which could, in turn be causing some of the bothersome low glucose readings.  Will discuss again at next visit.      2.  F/U in 4-6 weeks with me, sooner with concerns.     74 minutes spent on the date of the encounter doing chart review, review of test results, review of continuous glucose sensor, interpretation of glucose data, patient visit and documentation, counseling/coordination of care, and discussion of follow up plan for worsening hyper and hypoglycemia.  The patient understood and is satisfied with today's visit.        Greta Cannon PA-C, MPAS   [facility]  Department of Medicine  Division of Endocrinology and Diabetes

## 2023-04-03 ENCOUNTER — VIRTUAL VISIT (OUTPATIENT)
Dept: ENDOCRINOLOGY | Facility: CLINIC | Age: 28
End: 2023-04-03
Attending: INTERNAL MEDICINE
Payer: COMMERCIAL

## 2023-04-03 ENCOUNTER — TELEPHONE (OUTPATIENT)
Dept: ENDOCRINOLOGY | Facility: CLINIC | Age: 28
End: 2023-04-03

## 2023-04-03 ENCOUNTER — PRE VISIT (OUTPATIENT)
Dept: ENDOCRINOLOGY | Facility: CLINIC | Age: 28
End: 2023-04-03

## 2023-04-03 DIAGNOSIS — E16.2 HYPOGLYCEMIA: ICD-10-CM

## 2023-04-03 PROCEDURE — 99205 OFFICE O/P NEW HI 60 MIN: CPT | Mod: VID | Performed by: PHYSICIAN ASSISTANT

## 2023-04-03 RX ORDER — BLOOD SUGAR DIAGNOSTIC
STRIP MISCELLANEOUS
COMMUNITY
Start: 2023-03-25 | End: 2023-05-17

## 2023-04-03 NOTE — NURSING NOTE
Is the patient currently in the state of [place]? YES    Visit mode:VIDEO    If the visit is dropped, the patient can be reconnected by: VIDEO VISIT: Send to e-mail at: [email or web address]    Will anyone else be joining the visit? NO      How would you like to obtain your AVS? MyChart    Are changes needed to the allergy or medication list? NO    Reason for visit: [place]

## 2023-04-03 NOTE — PATIENT INSTRUCTIONS
Check with primary care or insurance about coverage for eating disorder clinic.  Let me know if you need a referral.     Repeat labs.  Lab [phone]    Log any low blood sugars and what you ate and drank in the hours before.  Consider visit with dietitian.     Consider Metformin in the future to stabilize glucose.     Focus on increasing the fiber in your diet to help stabilize your glucose.      Women should try to eat at least 21 to 25 grams of fiber a day, while men should aim for 30 to 38 grams a day.    Here's a look at how much dietary fiber is found in some common foods. When buying packaged foods, check the Nutrition Facts label for fiber content. It can vary among brands.    Fruits     Serving size  Total fiber (grams)*  Raspberries    1 cup   8.0  Pear     1 medium  5.5  Apple, with skin   1 medium  4.5  Banana    1 medium  3.0  Orange    1 medium  3.0  Strawberries    1 cup   3.0    Vegetables    Serving size  Total fiber (grams)*  Green peas, boiled   1 cup   9.0  Broccoli, boiled   1 cup chopped  5.0  Turnip greens, boiled   1 cup   5.0  [place] sprouts, boiled  1 cup   4.0  Potato, with skin, baked  1 medium  4.0  Sweet corn, boiled   1 cup   3.5  Cauliflower, raw   1 cup chopped  2.0  Carrot, raw    1 medium  1.5    Grains     Serving size  Total fiber (grams)*  Spaghetti, whole-wheat, cooked 1 cup   6.0  Barley, pearled, cooked  1 cup   6.0  Bran flakes    3/4 cup   5.5  Quinoa, cooked   1 cup   5.0  Oat bran muffin   1 medium  5.0  Oatmeal, instant, cooked  1 cup   5.0  Popcorn, air-popped   3 cups   3.5  Brown rice, cooked   1 cup   3.5  Bread, whole-wheat   1 slice   2.0  Bread, rye    1 slice   2.0    Legumes, nuts and seeds  Serving size  Total fiber (grams)*  Split peas, boiled   1 cup   16.0  Lentils, boiled    1 cup   15.5  Black beans, boiled   1 cup   15.0  Baked beans, canned   1 cup   10.0  [place] seeds    1 ounce  10.0  Almonds    1 ounce (23 nuts) 3.5  Pistachios    1  ounce (49 nuts) 3.0  Sunflower kernels   1 ounce  3.0  *Rounded to nearest 0.5 gram.    Source: [employer] National Nutrient Database for Standard      Schedule with health psychologist- Carla Cunningham, Ph.D., A.B.P.P., LP        [phone].    HEALTH PSYCHOLOGY    What is Health Psychology?  Health Psychology is a specialty that helps people cope with the stress and anxiety that often occurs with illness, emotional and psychological issues, and preparation for medical treatment including surgical procedures.    Telehealth services are now being provided to patients.    Location[facility]                       [address] [place] [address]                     Please arrive 15 minutes early to check in for in-person appointments.       We can help patients affected by  Adjustment problems  Anxiety  Cancer  Cardiovascular disorders  Chronic digestive disorders  Depression  Diabetes  Disability  Health-related behavior change  Insomnia  Other medical and nervous system conditions  People experiencing or wishing to prevent health problems  Pulmonary/lung conditions  Smoking cessation  Transplants    Treatments we offer include  Psychological assessment  Psychotherapy/counseling  Cognitive behavioral therapy  Relaxation training  Behavior therapy  Motivational interviewing  Stress management    How We Help  Coping: We help people with the emotional issues related to their illness.  Challenges: Difficult challenges seem to increase during illness. We teach steps and strategies for managing stressful situations.  Feelings: We help people deal with anger, anxiety, confusion, depression, fear, frustration, grief, loss of control, sadness, uncertainty, and motivational issues.  Behaviors: When people are ill, it can be harder to take care of themselves. We help people manage weight, follow health regimens, relax, and quit smoking.  Counseling: We offer several types of counseling and can create a plan  that meets needs of a patient. Our practice works with individuals, couples, and families.    Our Care Team  Working with primary and specialty physicians at [facility], we see patients in the hospital and clinic, allowing us to coordinate our services with the rest of people's medical needs.       To Schedule an Appointment  New patient appointments are generally scheduled through the Central Scheduling number: [phone].      Patients may contact our psychologists directly with questions or to make an appointment.    Sonali Morris, Ph.D.,                    [phone] website  Brandie Daniels, Ph.D.,                    [phone] website  Melody Lanza, Ph.D., A.B.P.P.,       [phone] website         Chin Mai, Ph.D., A.B.P.P.,  [phone] website   Isaura Hatch, Ph.D.,                     [phone] website  Carla Cunningham, Ph.D., A.B.P.P.,         [phone] website    NOTE: Services are confidential. Insurance coverage varies. Mental health benefits of health plans may have different levels of coverage than medical benefits. Please confirm the coverage details with the insurance plan or our business office.

## 2023-04-04 ENCOUNTER — OFFICE VISIT (OUTPATIENT)
Dept: PLASTIC SURGERY | Facility: CLINIC | Age: 28
End: 2023-04-04
Payer: COMMERCIAL

## 2023-04-04 VITALS
SYSTOLIC BLOOD PRESSURE: 125 MMHG | HEIGHT: 65 IN | OXYGEN SATURATION: 97 % | BODY MASS INDEX: 35.82 KG/M2 | DIASTOLIC BLOOD PRESSURE: 84 MMHG | WEIGHT: 215 LBS | HEART RATE: 68 BPM

## 2023-04-04 DIAGNOSIS — Z90.13 S/P BILATERAL MASTECTOMY: ICD-10-CM

## 2023-04-04 DIAGNOSIS — E66.812 CLASS 2 OBESITY WITH BODY MASS INDEX (BMI) OF 35.0 TO 35.9 IN ADULT, UNSPECIFIED OBESITY TYPE, UNSPECIFIED WHETHER SERIOUS COMORBIDITY PRESENT: ICD-10-CM

## 2023-04-04 DIAGNOSIS — F64.0 GENDER DYSPHORIA IN ADULT: Primary | ICD-10-CM

## 2023-04-04 PROCEDURE — 99214 OFFICE O/P EST MOD 30 MIN: CPT | Performed by: SURGERY

## 2023-04-04 ASSESSMENT — PAIN SCALES - GENERAL: PAINLEVEL: MILD PAIN (2)

## 2023-04-04 NOTE — PROGRESS NOTES
"PLASTICS POST-OP  This is a 27 year old trans male with a history of gender dysphoria who presents 6 months post-op after a bilateral simple mastectomy with nipple graft reconstruction on 9/26/2022. He is here today with his partner.    They note that they were recently diagnosed with POTS, that might have contributed to some hypoxia in PACU. Currently they are on Propanolol for heartbeat regulation. They are stable on treatment for this, while consuming extra fluids and salts.     PE: Chest: Nice upper chest contour.   Good symmetry with some inferior dipping along R anterolateral corner > L.   Right nipple is larger and better defined but gets ingrown hairs and fluid. Likes smaller nipple on L better.   Crease present across chest when pushes arms forward and inward, particularly with looser skin on the right.  Minimal \"firm spot\" within R corner skin flap on palpation.   Scars are thin  Minor dog ear R only.   Slight dimple at midline (incisions do not touch) - \"collects lint\"  Nipples are somewhat asymmetrical with R being slightly lower.   Photos taken with verbal consent.     A&P: 27 year old trans male who presents 6 months post-op after a bilateral simple mastectomy with nipple graft conservation on 9/26/2022.     Overall Loganne has healed well. He has been gradually increasing activities as tolerated given their complex medical history with possible EDS. ROM today is at 100 %    They are unhappy with the drooping that happens on the right side. This bothers them on the left as well, but is more noticeable on the right side. He states he has some concern about the lateral roll on the right side, \"it sets off my dysphoria,\" he states. We discussed the possibility of fat necrosis for a small lump he noticed. We discussed that revision is possible, but it may not alleviate the creasing that happens when they move their arms forward. We had an in-depth conversation about potential areas of revision.     They " "state that the right nipple \"keeps getting ingrown hairs.\" The right is also significantly larger than the left, per patient's report.    There is a good chance that the right nipple will be lower if revision happens. He is ok with this provided that the left nipple is lowered to match the repositioning on the right. Dog ears could also be corrected during procedure.    Patient states he is also bothered by the indentation at midline. They state they would be ok with one continuous scar.     Gregg will require an updated letter of support from their therapist, stating that they are still suffering from persistent dysphoria. They can upload this to [employer]. electronically. This letter will be submitted by our staff to their insurance company for coverage of a revision procedure.     Total time = 30 minutes, spent on the date of encounter doing chart review, history and physical, dressing changes, documentation, patient education, and any further activity as noted above.     This note was prepared on behalf of Anita Grant MD by Jennifer Dominguez (they/them), a trained medical scribe, based on my observations and the provider's statements to me.         "

## 2023-04-04 NOTE — NURSING NOTE
"Chief Complaint   Patient presents with     RECHECK     Gregg, is being today for a 6 month follow up DOS 9/26/23, right side of chest lump, right nipple hair, graft size is different, as reported by patient.       Vitals:    04/04/23 1412   BP: 125/84   BP Location: Left arm   Patient Position: Chair   Cuff Size: Adult Large   Pulse: 68   SpO2: 97%   Weight: 97.5 kg (215 lb)   Height: 1.651 m (5' 5\")       Body mass index is 35.78 kg/m .      Cassandra Zamora LPN    "

## 2023-04-04 NOTE — LETTER
"4/4/2023       RE: Gregg Maharaj  [facility]  [address]  [place] [address]       Dear Colleague,    Thank you for referring your patient, Gregg Maharaj, to the [facility] [place] at [facility]. Please see a copy of my visit note below.    PLASTICS POST-OP  This is a 27 year old trans male with a history of gender dysphoria who presents 6 months post-op after a bilateral simple mastectomy with nipple graft reconstruction on 9/26/2022. He is here today with his partner.    They note that they were recently diagnosed with POTS, that might have contributed to some hypoxia in PACU. Currently they are on Propanolol for heartbeat regulation. They are stable on treatment for this, while consuming extra fluids and salts.     PE: Chest: Nice upper chest contour.   Good symmetry with some inferior dipping along R anterolateral corner > L.   Right nipple is larger and better defined but gets ingrown hairs and fluid. Likes smaller nipple on L better.   Crease present across chest when pushes arms forward and inward, particularly with looser skin on the right.  Minimal \"firm spot\" within R corner skin flap on palpation.   Scars are thin  Minor dog ear R only.   Slight dimple at midline (incisions do not touch) - \"collects lint\"  Nipples are somewhat asymmetrical with R being slightly lower.   Photos taken with verbal consent.     A&P: 27 year old trans male who presents 6 months post-op after a bilateral simple mastectomy with nipple graft conservation on 9/26/2022.     Overall Gregg has healed well. He has been gradually increasing activities as tolerated given their complex medical history with possible EDS. ROM today is at 100 %    They are unhappy with the drooping that happens on the right side. This bothers them on the left as well, but is more noticeable on the right side. He states he has some concern about the " "lateral roll on the right side, \"it sets off my dysphoria,\" he states. We discussed the possibility of fat necrosis for a small lump he noticed. We discussed that revision is possible, but it may not alleviate the creasing that happens when they move their arms forward. We had an in-depth conversation about potential areas of revision.     They state that the right nipple \"keeps getting ingrown hairs.\" The right is also significantly larger than the left, per patient's report.    There is a good chance that the right nipple will be lower if revision happens. He is ok with this provided that the left nipple is lowered to match the repositioning on the right. Dog ears could also be corrected during procedure.    Patient states he is also bothered by the indentation at midline. They state they would be ok with one continuous scar.     Gregg will require an updated letter of support from their therapist, stating that they are still suffering from persistent dysphoria. They can upload this to [employer] electronically. This letter will be submitted by our staff to their insurance company for coverage of a revision procedure.     Total time = 30 minutes, spent on the date of encounter doing chart review, history and physical, dressing changes, documentation, patient education, and any further activity as noted above.     This note was prepared on behalf of Anita Grant MD by Jennifer Dominguez (they/them), a trained medical scribe, based on my observations and the provider's statements to me.             Again, thank you for allowing me to participate in the care of your patient.      Sincerely,    Anita Grant MD      "

## 2023-04-12 ENCOUNTER — LAB (OUTPATIENT)
Dept: LAB | Facility: CLINIC | Age: 28
End: 2023-04-12
Payer: COMMERCIAL

## 2023-04-12 DIAGNOSIS — E16.2 HYPOGLYCEMIA: ICD-10-CM

## 2023-04-12 LAB
C PEPTIDE SERPL-MCNC: 4.4 NG/ML (ref 0.9–6.9)
FASTING STATUS PATIENT QL REPORTED: YES
GLUCOSE SERPL-MCNC: 104 MG/DL (ref 70–99)
INSULIN SERPL-ACNC: 20.2 UU/ML (ref 2.6–24.9)
TSH SERPL DL<=0.005 MIU/L-ACNC: 1.92 UIU/ML (ref 0.3–4.2)

## 2023-04-12 PROCEDURE — 83525 ASSAY OF INSULIN: CPT | Mod: 90 | Performed by: PATHOLOGY

## 2023-04-12 PROCEDURE — 36415 COLL VENOUS BLD VENIPUNCTURE: CPT | Performed by: PATHOLOGY

## 2023-04-12 PROCEDURE — 84681 ASSAY OF C-PEPTIDE: CPT | Mod: 90 | Performed by: PATHOLOGY

## 2023-04-12 PROCEDURE — 82947 ASSAY GLUCOSE BLOOD QUANT: CPT | Performed by: PATHOLOGY

## 2023-04-12 PROCEDURE — 99000 SPECIMEN HANDLING OFFICE-LAB: CPT | Performed by: PATHOLOGY

## 2023-04-12 PROCEDURE — 84443 ASSAY THYROID STIM HORMONE: CPT | Performed by: PATHOLOGY

## 2023-04-18 ENCOUNTER — DOCUMENTATION ONLY (OUTPATIENT)
Dept: PLASTIC SURGERY | Facility: CLINIC | Age: 28
End: 2023-04-18
Payer: COMMERCIAL

## 2023-04-18 NOTE — PROGRESS NOTES
[facility] :  Care Coordination Note     SITUATION   Gregg Maharaj is a 27 year old adult who is receiving support for:  Care Team  .    BACKGROUND     Reviewed LOS. It meets criteria. Missing DA. Sent message to pt requesting DA.    ASSESSMENT     Surgery              CGC Assessment  [employer] [employer] [facility] (CGC) Enrollment: Enrolled  Patient has a therapist: Yes  Name of therapist: Arlet Cabrera at [facility]  Letter of support #1: Received  Letter #1 Date: 04/04/23  Surgery being considered: Yes  Mastectomy: Yes          PLAN          Nursing Interventions:       Follow-up plan:  Pt to upload DA. Case request to be placed, surgery to be scheduled.        RUKHSANA HENDERSON LPCC

## 2023-05-02 NOTE — PROGRESS NOTES
Virtual Visit Details    Outcome for 05/02/23 10:09 AM: [employer] message sent  Adalgisa Maldonado, MA  Outcome for 05/03/23 12:42 PM: Glucose readings sent via [employer]  Padmini Dailey LPN     Start time: 1103  End time: 1124  Provider location: off site- home  Patient location: off site- home.        HPI:   Gregg is a very pleasant 28 yo transgender male here for follow up of hypoglycemia.  He also has a history of fibromyalgia, connective tissue disorder, asthma, narcolepsy, pelvic floor dysfunction, POTS, and remote history of an eating disorder.  Patient has been bothered by symptoms of irritability, shakiness, weakness with blood glucose readings in the 50-60's and feels better after treatment.  Recent laboratory results are reassuring and he does not give a history concerning for insulinoma (no life-threatening hypoglycemia, loss of consciousness, etc). Negative screen for adrenal insufficiency and secondary hypothyroidism.  He does have fasting hyperinsulinemia, which may be an early sign of pre-diabetes.  Patient is seen by his primary care provider, Dr. Jessica Cardoso and has had a recent thorough evaluation by Dr. Heredia, endocrinologist through [facility], for the same issue. Please refer to my previous note for details.      Since his last visit, he has continued working on more dietary modifications.  He has lowered soda intake.  Average of 1 can per day.  More days he has none.  His glucose has been more stable, but he still feels low in the 80s on occasion.  He has started seeing Carla Cunningham, health psychologist, and this has been helpful.  He has not yet looked into getting help for his eating disorder, but plans to.       Current diet:    Gregg has cut back his soda consumption significantly.   He is trying to eat a healthier diet, but says that this is quite limited by his finances/SNAP benefits. Tries to have high protein- usually tries to make a meal with a lot of meat at least once a day.  Meat or  eggs.  High protein snacks such as protein bars, jerky, nuts, bowl of rice with seasoning, soy sauce, eggs.  Limited vegetables, spinach. Takes fiber gummies. Tries to eat whole wheat instead of white. Wake up: breakfast corn dogs or pop tarts, tatiana bars. Something easy to make, as he has low energy.      Very limited activity due to connective tissue disorder.     We reviewed his recent glucose data below. He has been doing both finger sticks and using the miranda sensor.  He likes having both.  Recent glucose is as follows:               Past Medical History:   Diagnosis Date     ADHD (attention deficit hyperactivity disorder)      VERNON positive      Anxiety      Asthma      Borderline personality disorder (H)      Concussion      Depression      Fibromyalgia      Gastroesophageal reflux disease with esophagitis      Gender dysphoria in adolescent and adult      Hypermobility of joint      Hypersomnia      Migraine      Mucous retention cyst of maxillary sinus      Obesity      PTSD (post-traumatic stress disorder)      Seasonal allergic rhinitis      Urinary frequency        Past Surgical History:   Procedure Laterality Date     Direct microlaryngoscopy with biopsy  06/29/2022     TRANSGENDER MASTECTOMY Bilateral 9/26/2022    Procedure: MASTECTOMY, BILATERAL, SIMPLE, nipple grafts. OnQ;  Surgeon: Anita Grant MD;  Location: [facility] OR       Family History   Problem Relation Age of Onset     Anesthesia Reaction No family hx of      Deep Vein Thrombosis (DVT) No family hx of    Mom- has hypoglycemia, pituitary tumors.    Grandfather- type 2 DM.         Social History   Social Hx: Lives with partner, Delta.  On disability currently.     Socioeconomic History     Marital status: Single   Tobacco Use     Smoking status: Never     Smokeless tobacco: Never       Current Outpatient Medications   Medication     ACCU-CHEK GUIDE test strip     albuterol (PROAIR HFA/PROVENTIL HFA/VENTOLIN HFA) 108 (90 Base) MCG/ACT inhaler      azelastine (ASTELIN) 0.1 % nasal spray     baclofen (LIORESAL) 10 MG tablet     buPROPion (WELLBUTRIN XL) 150 MG 24 hr tablet     calcipotriene (DOVONOX) 0.005 % external solution     cetirizine (ZYRTEC) 10 MG tablet     clindamycin-benzoyl peroxide (BENZACLIN) 1-5 % external gel     clotrimazole (LOTRIMIN) 1 % external cream     CONCERTA 54 MG CR tablet     Continuous Blood Gluc Sensor (FREESTYLE JOSSY 2 SENSOR) MISC     diazepam (VALIUM) 5 MG tablet     diphenhydrAMINE (BENADRYL) 25 MG capsule     EPINEPHrine (ANY BX GENERIC EQUIV) 0.3 MG/0.3ML injection 2-pack     estradiol cypionate (DEPO-ESTRADIOL) 5 MG/ML injection     famotidine (PEPCID) 20 MG tablet     gabapentin (NEURONTIN) 300 MG capsule     ibuprofen (ADVIL/MOTRIN) 200 MG capsule     ketoconazole (NIZORAL) 2 % external shampoo     loratadine (CLARITIN) 10 MG tablet     Methylphenidate HCl (RITALIN PO)     minoxidil (LONITEN) 2.5 MG tablet     Minoxidil 5 % FOAM     montelukast (SINGULAIR) 10 MG tablet     OTHER MEDICAL SUPPLIES     oxybutynin ER (DITROPAN XL) 5 MG 24 hr tablet     propranolol ER (INDERAL LA) 160 MG 24 hr capsule     Specialty Vitamins Products (VITAMINS FOR HAIR) CAPS     SUMAtriptan (IMITREX) 50 MG tablet     tacrolimus (PROTOPIC) 0.1 % external ointment     testosterone (ANDROGEL 1.62 % PUMP) 20.25 MG/ACT gel     tretinoin (RETIN-A) 0.05 % external cream     venlafaxine (EFFEXOR-ER) 150 MG TB24 24 hr tablet     vitamin D3 (CHOLECALCIFEROL) 10 MCG (400 UNIT) capsule     Current Facility-Administered Medications   Medication     ciprofloxacin (CIPRO) tablet 500 mg          Allergies   Allergen Reactions     Capsaicin Anaphylaxis     Capsicum Annuum Extract & Derivative (Bell Pepper) [Capsicum] Anaphylaxis     Food Shortness Of Breath     Red chili peppers (harissa paste)     No Clinical Screening - See Comments Anaphylaxis, Dermatitis, Hives, Itching, Rash and Shortness Of Breath     Cotton seed oil  Cotton seed oil     Adhesive Tape       Other reaction(s): Atopic Dermatitis  Other reaction(s): Atopic Dermatitis  Other reaction(s): Atopic Dermatitis     Other Environmental Allergy Hives     Cotton seed oil     Metoclopramide Anxiety     Makes pt feel claustrophobic       Physical Exam  There were no vitals taken for this visit.  GENERAL: healthy, alert and no distress  RESP: no audible wheeze, cough, or visible cyanosis.  No visible retractions or increased work of breathing.  Able to speak fully in complete sentences.  PSYCH: mentation appears normal, affect normal/bright, judgement and insight intact, normal speech and appearance well-groomed    RESULTS  No results found for: A1C, HEMOGLOBINA1    TSH   Date Value Ref Range Status   04/12/2023 1.92 0.30 - 4.20 uIU/mL Final   12/15/2022 1.31 0.30 - 4.20 uIU/mL Final       No results found for: ALT]    No results for input(s): CHOL, HDL, LDL, TRIG, CHOLHDLRATIO in the last 52960 hours.    No results found for: NA   No results found for: POTASSIUM  No results found for: CHLORIDE  No results found for: JASMINE  No results found for: CO2  No results found for: BUN  Lab Results   Component Value Date    CR 0.8 02/14/2022       GFR, ESTIMATED POCT   Date Value Ref Range Status   02/14/2022 >60 >60 mL/min/1.73m2 Final     Comment:     GFR not calculated when sex unspecified or nonbinary.     No results found for: GFRESTBLACK    No results found for: MICROL  No results found for: MICROALBUMIN  Lab Results   Component Value Date    CPEPT 4.4 04/12/2023       No results found for: B12]    Most recent eye exam date: : Not Found     Labs: Care Everywhere:          (ABNORMAL) GLUCOSE, FASTING (03/13/2023 8:20 AM CDT)  Lab Results - (ABNORMAL) GLUCOSE, FASTING (03/13/2023 8:20 AM CDT)  Component Value Ref Range Test Method Analysis Time Performed At [facility]   GLUCOSE 105 (H) 70 - 99 mg/dL   03/13/2023 8:43 AM CDT [facility]       (ABNORMAL) INSULIN (03/13/2023 8:20 AM  CDT)  Lab Results - (ABNORMAL) INSULIN (03/13/2023 8:20 AM CDT)  Component Value Ref Range Test Method Analysis Time Performed At [facility]   INSULIN,SERUM 29.8 (H) 2.6 - 24.9 uIU/mL   03/13/2023 4:53 PM CDT [facility]       Lab Results - (ABNORMAL) INSULIN (03/13/2023 8:20 AM CDT)  Specimen (Source) Anatomical Location / Laterality Collection Method / Volume Collection Time Received Time   Blood BLOOD SPECIMEN / Unknown Venipuncture / Unknown 03/13/2023 8:20 AM CDT 03/13/2023 8:24 AM CDT     Lab Results - (ABNORMAL) INSULIN (03/13/2023 8:20 AM CDT)  Narrative   [facility] - 03/13/2023 4:53 PM CDT    Biotin supplements may cause clinically significant interference for this test assay.  If interference is suspected, it is strongly recommended that biotin is discontinued for at least one week prior to retesting.          HEMOGLOBIN A1C SCREENING (03/13/2023 8:20 AM CDT)  Lab Results - HEMOGLOBIN A1C SCREENING (03/13/2023 8:20 AM CDT)  Component Value Ref Range Test Method Analysis Time Performed At [facility]   HEMOGLOBIN A1C SCREENING 5.4 <=6.4 %   03/13/2023 4:28 PM CDT [facility]          LC THYROXINE (T4) FREE DIRECT (03/13/2023 8:20 AM CDT)  Lab Results -  THYROXINE (T4) FREE DIRECT (03/13/2023 8:20 AM CDT)  Component Value Ref Range Test Method Analysis Time Performed At [facility]   T4Free(Direct) 1.22 0.82 - 1.77 ng/dL   03/15/2023 12:08 PM CDT [facility]            LC TSH (03/13/2023 8:20 AM CDT)  Lab Results -  TSH (03/13/2023 8:20 AM CDT)  Component Value Ref Range Test Method Analysis Time Performed At [facility]   TSH 2.580 0.450 - 4.500 uIU/mL   03/15/2023 12:08 PM CDT [facility]               Assessment/Plan:   1.  Hypoglycemia- Patient has been bothered by symptoms of  irritability, shakiness, weakness with blood glucose readings in the 50-60's and feels better after treatment.  Recent laboratory results are reassuring and he does not give a history concerning for insulinoma (no life-threatening hypoglycemia, loss of consciousness, etc). Negative screen for adrenal insufficiency and secondary hypothyroidism.  He does have fasting hyperinsulinemia, which may be an early sign of pre-diabetes.  Repeat labs were reassuring. It is likely the high refined carbohydrates were stimulating a siginficant insulin response, which in turn was driving his glucose lower. We again reviewed the potential role of metformin in minimizing glucose excursions and increasing insulin sensitivity and he is interested in trying.  Discussed side effects.  Will start with 500 mg once a day and he will let me know how he is doing.  Would plan to check vitamin b12 if he remains on this long term.  We made the following plan today (instructions given to patient):      Start metformin 500 mg once a day.      Please let me know if you are having low blood sugars less than 70 or over 250 mg/dL.      If you have concerns, please send me a [employer] message M-Th, call the clinic at [phone], or call [phone] after hours/weekends and ask to speak with the endocrinologist on call.      2.  F/U prn.     41 minutes spent on the date of the encounter doing chart review, review of test results, review of continuous glucose sensor, interpretation of glucose data, patient visit and documentation, counseling/coordination of care, and discussion of follow up plan for worsening hyper and hypoglycemia.  The patient understood and is satisfied with today's visit.        Greta Cannon PA-C, MPAS   [facility]  Department of Medicine  Division of Endocrinology and Diabetes

## 2023-05-08 ENCOUNTER — VIRTUAL VISIT (OUTPATIENT)
Dept: PSYCHOLOGY | Facility: CLINIC | Age: 28
End: 2023-05-08
Payer: COMMERCIAL

## 2023-05-08 DIAGNOSIS — F41.9 ANXIETY DISORDER, UNSPECIFIED TYPE: ICD-10-CM

## 2023-05-08 DIAGNOSIS — F32.A DEPRESSION, UNSPECIFIED DEPRESSION TYPE: Primary | ICD-10-CM

## 2023-05-08 PROCEDURE — 90834 PSYTX W PT 45 MINUTES: CPT | Mod: VID | Performed by: PSYCHOLOGIST

## 2023-05-08 ASSESSMENT — ANXIETY QUESTIONNAIRES
5. BEING SO RESTLESS THAT IT IS HARD TO SIT STILL: NOT AT ALL
GAD7 TOTAL SCORE: 10
GAD7 TOTAL SCORE: 10
1. FEELING NERVOUS, ANXIOUS, OR ON EDGE: MORE THAN HALF THE DAYS
2. NOT BEING ABLE TO STOP OR CONTROL WORRYING: SEVERAL DAYS
6. BECOMING EASILY ANNOYED OR IRRITABLE: MORE THAN HALF THE DAYS
IF YOU CHECKED OFF ANY PROBLEMS ON THIS QUESTIONNAIRE, HOW DIFFICULT HAVE THESE PROBLEMS MADE IT FOR YOU TO DO YOUR WORK, TAKE CARE OF THINGS AT HOME, OR GET ALONG WITH OTHER PEOPLE: VERY DIFFICULT
4. TROUBLE RELAXING: MORE THAN HALF THE DAYS
7. FEELING AFRAID AS IF SOMETHING AWFUL MIGHT HAPPEN: MORE THAN HALF THE DAYS
7. FEELING AFRAID AS IF SOMETHING AWFUL MIGHT HAPPEN: MORE THAN HALF THE DAYS
GAD7 TOTAL SCORE: 10
8. IF YOU CHECKED OFF ANY PROBLEMS, HOW DIFFICULT HAVE THESE MADE IT FOR YOU TO DO YOUR WORK, TAKE CARE OF THINGS AT HOME, OR GET ALONG WITH OTHER PEOPLE?: VERY DIFFICULT
3. WORRYING TOO MUCH ABOUT DIFFERENT THINGS: SEVERAL DAYS

## 2023-05-08 NOTE — PROGRESS NOTES
Health Psychology          Sonali Morris, Ph.D.,  [phone]  Anastasiya Pelayo, Ph.D.,  [phone]  Brandie Daniels, Ph.D.,  [phone]  Melody Lanza, Ph.D., , ABP [phone]  Chin Mai, Ph.D., , ABPP [phone]  Isaura Hatch, Ph.D.,  [phone]  Carla Cunningham, Ph.D., , ABPP [phone]    [facility], 3rd Floor  [address] [place] [address]       Health Psychology Progress Note    Patient Name: Gregg Maharaj    Service Type: Individual  Length of Visit: 50 minutes  Start time: 2:00 PM  Stop time: 2:50 PM   Attendees: patient attended with partner, Yuan, who was off camera for the duration of the session per patient's request   Session #: 1    Telemedicine Information:     Telemedicine Visit: The patient's condition can be safely assessed and treated via synchronous audio and visual telemedicine encounter.    Reason for Telemedicine Visit: Patient has requested telehealth visit and Patient convenience (e.g. access to timely appointments / distance to available provider)  Originating Site (Patient Location): Patient's home, [place]  Distant Location (provider location):  Off-site: Provider's Home Office  Consent:  The patient/guardian has verbally consented to: the potential risks and benefits of telemedicine (video visit) versus in person care; bill my insurance or make self-payment for services provided; and responsibility for payment of non-covered services.   Mode of Communication:  Video Conference via Reading Room  As the provider I attest to compliance with applicable laws and regulations related to telemedicine.    Identifying Information and Presenting Problem:  The patient is a 27 year old adult who is being seen for problematic symptoms of stress in the context of multiple complex medical concerns.    Topics Discussed/Interventions Provided:    Orientation to Service:    Is patient the family member of an  employee who works at this clinic or connected to a patient health psychology provider is already treating? No    Discussed dual role conflicts? Yes     Discussed privacy and confidentiality, including limits? Yes     Is patient already established with a mental health provider? Yes: individual therapist, sees weekly for borderline personality disorder (JESSICA Zhao at [facility]), and [facility] for medication management (Kirstin Mattson DNP ([facility]), PMANAYAP-BC)      Health Psychology Service Introduction: Discussed the health psychology service. Provided education about role as health psychology provider and model of care. Patient was given the opportunity to ask questions and state his goals/expectations for initiating services with the health psychology provider. Patient is interested in establishing services with the health psychology provider.    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 Was referred for health psychology services by endocrinology provider, Greta Cannon PA-C for assistance with stress in the context of multiple, chronic health conditions. Reports a longstanding history of hypoglycemia with an onset in early childhood. States BG increases are often connected with high levels of stress and there is some suspicion that his insulin resistance may be connected to higher levels of stress. States that he is in the practice of checking his BG during rapid mood changes and other symptoms that can mimic anxiety. Reports that his medical symptoms and associated stress have negatively impacted his daily functioning with activities of daily living,  quality of life, and interpersonal functioning. Overall goals for health psychology intervention include increasing participation in meaningful and purposeful activities outside of the home, increasing independence, and processing grief and loss associated with change in medical status.       Skills/intervention: Provided psychoeducation about health psychology interventions and treatment options. Discussed plan for patient to continue with primary mental health treatment through his individual therapist and psychiatrist and to complete a health behavior intervention with me. Briefly introduced ACT as a therapeutic framework     Treatment Objective(s) Addressed in This Session:  Psychological distress related to Chronic Disease Management    Progress on / Status of Treatment Objective(s) / Homework:  In progress    Medication Adherence: Patient did not report medication changes. Current medication list is below:     Current Outpatient Medications   Medication     ACCU-CHEK GUIDE test strip     albuterol (PROAIR HFA/PROVENTIL HFA/VENTOLIN HFA) 108 (90 Base) MCG/ACT inhaler     azelastine (ASTELIN) 0.1 % nasal spray     baclofen (LIORESAL) 10 MG tablet     buPROPion (WELLBUTRIN XL) 150 MG 24 hr tablet     calcipotriene (DOVONOX) 0.005 % external solution     cetirizine (ZYRTEC) 10 MG tablet     clindamycin-benzoyl peroxide (BENZACLIN) 1-5 % external gel     clotrimazole (LOTRIMIN) 1 % external cream     CONCERTA 54 MG CR tablet     Continuous Blood Gluc Sensor (FREESTYLE JOSSY 2 SENSOR) MISC     diazepam (VALIUM) 5 MG tablet     diphenhydrAMINE (BENADRYL) 25 MG capsule     EPINEPHrine (ANY BX GENERIC EQUIV) 0.3 MG/0.3ML injection 2-pack     estradiol cypionate (DEPO-ESTRADIOL) 5 MG/ML injection     famotidine (PEPCID) 20 MG tablet     gabapentin (NEURONTIN) 300 MG capsule     ibuprofen (ADVIL/MOTRIN) 200 MG capsule     ketoconazole (NIZORAL) 2 % external shampoo     loratadine (CLARITIN) 10 MG tablet      Methylphenidate HCl (RITALIN PO)     minoxidil (LONITEN) 2.5 MG tablet     Minoxidil 5 % FOAM     montelukast (SINGULAIR) 10 MG tablet     OTHER MEDICAL SUPPLIES     oxybutynin ER (DITROPAN XL) 5 MG 24 hr tablet     propranolol ER (INDERAL LA) 160 MG 24 hr capsule     Specialty Vitamins Products (VITAMINS FOR HAIR) CAPS     SUMAtriptan (IMITREX) 50 MG tablet     tacrolimus (PROTOPIC) 0.1 % external ointment     testosterone (ANDROGEL 1.62 % PUMP) 20.25 MG/ACT gel     tretinoin (RETIN-A) 0.05 % external cream     venlafaxine (EFFEXOR-ER) 150 MG TB24 24 hr tablet     vitamin D3 (CHOLECALCIFEROL) 10 MCG (400 UNIT) capsule     Current Facility-Administered Medications   Medication     ciprofloxacin (CIPRO) tablet 500 mg       Assessment: The patient appeared to be active and engaged in today's session and was receptive to feedback.     Mental Status Exam:   Appearance: Appropriate   Eye Contact: Good    Orientation: Yes, x4  Behavior/relationship to provider/demeanor: Cooperative, Engaged and Pleasant  Motor Activity: normal or unremarkable  Mood (subjective report): Depressed, Anxious  Affect (objective appearance): Appropriate  Speech Rate: Normal  Speech Volume: Normal  Speech Articulation: Normal  Speech Coherence: Normal  Speech Spontaneity: Normal  Thought Content: endorses periodic passive suicidal ideation, denies active SI, plans, or intent, endorses depressive and anxious cognitions  Thought Process (associations): Logical, Linear and Goal directed  Thought Process (rate): Normal  Abnormal Perception: None  Attention/Concentration: Normal  Memory: Appears grossly intact, though patient had a support person present to provide additional context in the event of memory lapses  Fund of Knowledge: Appears within normal limits  Abstraction:  Normal  Insight:  Good  Judgment:  Good    Does the patient appear to be at imminent risk of harm to self/others at this time? No    The session was necessary to address stress  in the context of multiple medical conditions. Patient will follow-up for health behavior assessment and intervention and continue individual, ongoing psychotherapy to address mental health concerns.    Diagnoses:    1. Depression, unspecified depression type    2. Anxiety disorder, unspecified type      Plan:    1. Follow-up video visit with me on 5/23/23 at 2:00pm  2. Patient to use 911 or other crisis resources in the event of a psychiatric emergency  3. Primary care needs and medications are managed by Jessica Cardoso. Referring provider is Referred Self  4. Next visit agenda/goals:   a. Complete health behavior assessment  b. Review handouts on ACT    Carla Cunningham, Ph.D., LP, ABPP  Clinical Health Psychologist  Phone: [phone]   Pager: [phone]  5/8/2023 2:00 PM

## 2023-05-09 ENCOUNTER — VIRTUAL VISIT (OUTPATIENT)
Dept: ENDOCRINOLOGY | Facility: CLINIC | Age: 28
End: 2023-05-09
Payer: COMMERCIAL

## 2023-05-09 VITALS — BODY MASS INDEX: 35.78 KG/M2 | WEIGHT: 215 LBS

## 2023-05-09 DIAGNOSIS — E16.2 HYPOGLYCEMIA: Primary | ICD-10-CM

## 2023-05-09 PROCEDURE — 99215 OFFICE O/P EST HI 40 MIN: CPT | Mod: VID | Performed by: PHYSICIAN ASSISTANT

## 2023-05-09 ASSESSMENT — PATIENT HEALTH QUESTIONNAIRE - PHQ9: SUM OF ALL RESPONSES TO PHQ QUESTIONS 1-9: 10

## 2023-05-09 ASSESSMENT — PAIN SCALES - GENERAL: PAINLEVEL: MODERATE PAIN (5)

## 2023-05-09 NOTE — PATIENT INSTRUCTIONS
Start metformin 500 mg once a day.      Please let me know if you are having low blood sugars less than 70 or over 250 mg/dL.      If you have concerns, please send me a [email or web address] message M-Th, call the clinic at [phone], or call [phone] after hours/weekends and ask to speak with the endocrinologist on call.

## 2023-05-09 NOTE — NURSING NOTE
Is the patient currently in the state of [place]? YES    Visit mode:VIDEO    If the visit is dropped, the patient can be reconnected by: VIDEO VISIT: Text to cell phone: [phone]    Will anyone else be joining the visit? NO      How would you like to obtain your AVS? MyChart    Are changes needed to the allergy or medication list? NO    Reason for visit: Diabetes

## 2023-05-09 NOTE — LETTER
5/9/2023       RE: Gregg Maharaj  [address]  [address]  [place] [address]     Dear Colleague,    Thank you for referring your patient, Gregg Maharaj, to the [facility] [place] [facility]. Please see a copy of my visit note below.      Virtual Visit Details    Outcome for 05/02/23 10:09 AM: Toroleo message sent  Adalgisa Maldonado MA  Outcome for 05/03/23 12:42 PM: Glucose readings sent via Toroleo  Padmini Dailey LPN     Start time: 1103  End time: 1124  Provider location: off site- home  Patient location: off site- home.        HPI:   Gregg is a very pleasant 28 yo transgender male here for follow up of hypoglycemia.  He also has a history of fibromyalgia, connective tissue disorder, asthma, narcolepsy, pelvic floor dysfunction, POTS, and remote history of an eating disorder.  Patient has been bothered by symptoms of irritability, shakiness, weakness with blood glucose readings in the 50-60's and feels better after treatment.  Recent laboratory results are reassuring and he does not give a history concerning for insulinoma (no life-threatening hypoglycemia, loss of consciousness, etc). Negative screen for adrenal insufficiency and secondary hypothyroidism.  He does have fasting hyperinsulinemia, which may be an early sign of pre-diabetes.  Patient is seen by his primary care provider, Dr. Jessica Cardoso and has had a recent thorough evaluation by Dr. Heredia, endocrinologist through [facility], for the same issue. Please refer to my previous note for details.      Since his last visit, he has continued working on more dietary modifications.  He has lowered soda intake.  Average of 1 can per day.  More days he has none.  His glucose has been more stable, but he still feels low in the 80s on occasion.  He has started seeing Carla Cunningham, health psychologist, and this has been helpful.  He has not yet looked into getting help for his  eating disorder, but plans to.       Current diet:    Gregg has cut back his soda consumption significantly.   He is trying to eat a healthier diet, but says that this is quite limited by his finances/SNAP benefits. Tries to have high protein- usually tries to make a meal with a lot of meat at least once a day.  Meat or eggs.  High protein snacks such as protein bars, jerky, nuts, bowl of rice with seasoning, soy sauce, eggs.  Limited vegetables, spinach. Takes fiber gummies. Tries to eat whole wheat instead of white. Wake up: breakfast corn dogs or pop tarts, tatiana bars. Something easy to make, as he has low energy.      Very limited activity due to connective tissue disorder.     We reviewed his recent glucose data below. He has been doing both finger sticks and using the miranda sensor.  He likes having both.  Recent glucose is as follows:               Past Medical History:   Diagnosis Date    ADHD (attention deficit hyperactivity disorder)     VERNON positive     Anxiety     Asthma     Borderline personality disorder (H)     Concussion     Depression     Fibromyalgia     Gastroesophageal reflux disease with esophagitis     Gender dysphoria in adolescent and adult     Hypermobility of joint     Hypersomnia     Migraine     Mucous retention cyst of maxillary sinus     Obesity     PTSD (post-traumatic stress disorder)     Seasonal allergic rhinitis     Urinary frequency        Past Surgical History:   Procedure Laterality Date    Direct microlaryngoscopy with biopsy  06/29/2022    TRANSGENDER MASTECTOMY Bilateral 9/26/2022    Procedure: MASTECTOMY, BILATERAL, SIMPLE, nipple grafts. OnQ;  Surgeon: Anita Grant MD;  Location: [facility] OR       Family History   Problem Relation Age of Onset    Anesthesia Reaction No family hx of     Deep Vein Thrombosis (DVT) No family hx of    Mom- has hypoglycemia, pituitary tumors.    Grandfather- type 2 DM.         Social History   Social Hx: Lives with partner, Delta.  On  disability currently.     Socioeconomic History    Marital status: Single   Tobacco Use    Smoking status: Never    Smokeless tobacco: Never       Current Outpatient Medications   Medication    ACCU-CHEK GUIDE test strip    albuterol (PROAIR HFA/PROVENTIL HFA/VENTOLIN HFA) 108 (90 Base) MCG/ACT inhaler    azelastine (ASTELIN) 0.1 % nasal spray    baclofen (LIORESAL) 10 MG tablet    buPROPion (WELLBUTRIN XL) 150 MG 24 hr tablet    calcipotriene (DOVONOX) 0.005 % external solution    cetirizine (ZYRTEC) 10 MG tablet    clindamycin-benzoyl peroxide (BENZACLIN) 1-5 % external gel    clotrimazole (LOTRIMIN) 1 % external cream    CONCERTA 54 MG CR tablet    Continuous Blood Gluc Sensor (FREESTYLE JOSSY 2 SENSOR) MISC    diazepam (VALIUM) 5 MG tablet    diphenhydrAMINE (BENADRYL) 25 MG capsule    EPINEPHrine (ANY BX GENERIC EQUIV) 0.3 MG/0.3ML injection 2-pack    estradiol cypionate (DEPO-ESTRADIOL) 5 MG/ML injection    famotidine (PEPCID) 20 MG tablet    gabapentin (NEURONTIN) 300 MG capsule    ibuprofen (ADVIL/MOTRIN) 200 MG capsule    ketoconazole (NIZORAL) 2 % external shampoo    loratadine (CLARITIN) 10 MG tablet    Methylphenidate HCl (RITALIN PO)    minoxidil (LONITEN) 2.5 MG tablet    Minoxidil 5 % FOAM    montelukast (SINGULAIR) 10 MG tablet    OTHER MEDICAL SUPPLIES    oxybutynin ER (DITROPAN XL) 5 MG 24 hr tablet    propranolol ER (INDERAL LA) 160 MG 24 hr capsule    Specialty Vitamins Products (VITAMINS FOR HAIR) CAPS    SUMAtriptan (IMITREX) 50 MG tablet    tacrolimus (PROTOPIC) 0.1 % external ointment    testosterone (ANDROGEL 1.62 % PUMP) 20.25 MG/ACT gel    tretinoin (RETIN-A) 0.05 % external cream    venlafaxine (EFFEXOR-ER) 150 MG TB24 24 hr tablet    vitamin D3 (CHOLECALCIFEROL) 10 MCG (400 UNIT) capsule     Current Facility-Administered Medications   Medication    ciprofloxacin (CIPRO) tablet 500 mg          Allergies   Allergen Reactions    Capsaicin Anaphylaxis    Capsicum Annuum Extract & Derivative  (Bell Pepper) [Capsicum] Anaphylaxis    Food Shortness Of Breath     Red chili peppers (harissa paste)    No Clinical Screening - See Comments Anaphylaxis, Dermatitis, Hives, Itching, Rash and Shortness Of Breath     Cotton seed oil  Cotton seed oil    Adhesive Tape      Other reaction(s): Atopic Dermatitis  Other reaction(s): Atopic Dermatitis  Other reaction(s): Atopic Dermatitis    Other Environmental Allergy Hives     Cotton seed oil    Metoclopramide Anxiety     Makes pt feel claustrophobic       Physical Exam  There were no vitals taken for this visit.  GENERAL: healthy, alert and no distress  RESP: no audible wheeze, cough, or visible cyanosis.  No visible retractions or increased work of breathing.  Able to speak fully in complete sentences.  PSYCH: mentation appears normal, affect normal/bright, judgement and insight intact, normal speech and appearance well-groomed    RESULTS  No results found for: A1C, HEMOGLOBINA1    TSH   Date Value Ref Range Status   04/12/2023 1.92 0.30 - 4.20 uIU/mL Final   12/15/2022 1.31 0.30 - 4.20 uIU/mL Final       No results found for: ALT]    No results for input(s): CHOL, HDL, LDL, TRIG, CHOLHDLRATIO in the last 43529 hours.    No results found for: NA   No results found for: POTASSIUM  No results found for: CHLORIDE  No results found for: JASMINE  No results found for: CO2  No results found for: BUN  Lab Results   Component Value Date    CR 0.8 02/14/2022       GFR, ESTIMATED POCT   Date Value Ref Range Status   02/14/2022 >60 >60 mL/min/1.73m2 Final     Comment:     GFR not calculated when sex unspecified or nonbinary.     No results found for: GFRESTBLACK    No results found for: MICROL  No results found for: MICROALBUMIN  Lab Results   Component Value Date    CPEPT 4.4 04/12/2023       No results found for: B12]    Most recent eye exam date: : Not Found     Labs: Care Everywhere:          (ABNORMAL) GLUCOSE, FASTING (03/13/2023 8:20 AM CDT)  Lab Results - (ABNORMAL) GLUCOSE,  FASTING (03/13/2023 8:20 AM CDT)  Component Value Ref Range Test Method Analysis Time Performed At [facility]   GLUCOSE 105 (H) 70 - 99 mg/dL   03/13/2023 8:43 AM CDT [facility]       (ABNORMAL) INSULIN (03/13/2023 8:20 AM CDT)  Lab Results - (ABNORMAL) INSULIN (03/13/2023 8:20 AM CDT)  Component Value Ref Range Test Method Analysis Time Performed At [facility]   INSULIN,SERUM 29.8 (H) 2.6 - 24.9 uIU/mL   03/13/2023 4:53 PM CDT [facility]       Lab Results - (ABNORMAL) INSULIN (03/13/2023 8:20 AM CDT)  Specimen (Source) Anatomical Location / Laterality Collection Method / Volume Collection Time Received Time   Blood BLOOD SPECIMEN / Unknown Venipuncture / Unknown 03/13/2023 8:20 AM CDT 03/13/2023 8:24 AM CDT     Lab Results - (ABNORMAL) INSULIN (03/13/2023 8:20 AM CDT)  Narrative   [facility] - 03/13/2023 4:53 PM CDT    Biotin supplements may cause clinically significant interference for this test assay.  If interference is suspected, it is strongly recommended that biotin is discontinued for at least one week prior to retesting.          HEMOGLOBIN A1C SCREENING (03/13/2023 8:20 AM CDT)  Lab Results - HEMOGLOBIN A1C SCREENING (03/13/2023 8:20 AM CDT)  Component Value Ref Range Test Method Analysis Time Performed At [facility]   HEMOGLOBIN A1C SCREENING 5.4 <=6.4 %   03/13/2023 4:28 PM CDT [facility]          LC THYROXINE (T4) FREE DIRECT (03/13/2023 8:20 AM CDT)  Lab Results - LC THYROXINE (T4) FREE DIRECT (03/13/2023 8:20 AM CDT)  Component Value Ref Range Test Method Analysis Time Performed At [facility]   T4Free(Direct) 1.22 0.82 - 1.77 ng/dL   03/15/2023 12:08 PM CDT [facility]            LC TSH (03/13/2023 8:20 AM CDT)  Lab Results - LC TSH (03/13/2023 8:20 AM CDT)  Component Value Ref Range Test Method  Analysis Time Performed At [facility]   TSH 2.580 0.450 - 4.500 uIU/mL   03/15/2023 12:08 PM T [facility] ([facility])               Assessment/Plan:   1.  Hypoglycemia- Patient has been bothered by symptoms of irritability, shakiness, weakness with blood glucose readings in the 50-60's and feels better after treatment.  Recent laboratory results are reassuring and he does not give a history concerning for insulinoma (no life-threatening hypoglycemia, loss of consciousness, etc). Negative screen for adrenal insufficiency and secondary hypothyroidism.  He does have fasting hyperinsulinemia, which may be an early sign of pre-diabetes.  Repeat labs were reassuring. It is likely the high refined carbohydrates were stimulating a siginficant insulin response, which in turn was driving his glucose lower. We again reviewed the potential role of metformin in minimizing glucose excursions and increasing insulin sensitivity and he is interested in trying.  Discussed side effects.  Will start with 500 mg once a day and he will let me know how he is doing.  Would plan to check vitamin b12 if he remains on this long term.  We made the following plan today (instructions given to patient):      Start metformin 500 mg once a day.      Please let me know if you are having low blood sugars less than 70 or over 250 mg/dL.      If you have concerns, please send me a Roboinvest message M-Th, call the clinic at [phone], or call [phone] after hours/weekends and ask to speak with the endocrinologist on call.      2.  F/U prn.     41 minutes spent on the date of the encounter doing chart review, review of test results, review of continuous glucose sensor, interpretation of glucose data, patient visit and documentation, counseling/coordination of care, and discussion of follow up plan for worsening hyper and hypoglycemia.  The patient understood and is satisfied with today's visit.         Greta Cannon PA-C, MPAS   [facility]  Department of Medicine  Division of Endocrinology and Diabetes

## 2023-05-13 ENCOUNTER — MYC MEDICAL ADVICE (OUTPATIENT)
Dept: ENDOCRINOLOGY | Facility: CLINIC | Age: 28
End: 2023-05-13
Payer: COMMERCIAL

## 2023-05-13 DIAGNOSIS — E16.2 HYPOGLYCEMIA: ICD-10-CM

## 2023-05-13 DIAGNOSIS — E16.1 REACTIVE HYPOGLYCEMIA: Primary | ICD-10-CM

## 2023-05-16 NOTE — LETTER
"3/22/2022       RE: Gregg Maharaj  [address]  [address]  [place] [address]     Dear Colleague,    Thank you for referring your patient, Gregg Maharaj, to the [facility] [place] at [facility]. Please see a copy of my visit note below.    PLASTICS NEW TOP   HPI: This is a 26 year old biological female transitioning with a history of gender dysphoria and an autoimmune disorder who presents today with his partner, Yuan for a consultation for top surgery. His pronouns are he/him or they/them and his preferred name is Gregg. He was referred by a previous patient and made his appointment 9 months ago. His therapist is Arlet Cabrera at [facility] and has an LOS. He has been on testosterone for 2.5 years, administered by [facility]. He has been living his chosen gender identity/role since 2017. He used to bind but stopped due to pressure pain. Patient reports having occasional pain and mild lactation from R breast. No breast lumps, skin puckering, bloody nipple drainage, or other breast problems. No history of breast imaging, including mammogram or breast ultrasound.     Medical Hx: Gender dysphoria. No history of diabetes mellitus, or GERD. No bleeding, clotting, healing or scarring problems.  Positive VERNON test.  Possible Lupus? Fibromyalgia? Memory issues. Severe brain fog. Possibly due to autoimmune disorder.  Possible EDS?  Hypersomnia.  Sleep-maintence insomnia   Unexplained paralysis. Originally diagnosed as cataplexy. Emotional and environmental triggers. 4th generation? Treated with stimulants and Effexor x 8 years. Helped by NE uptake inhibitor.   Endometriosis.   Asthma.  Hypoglycemia.   Chronic migraines.   Anxiety.  Depression. Bipolar.  ADHD.   PTSD. Victim of stalking.   Features of psychosis - \"\". Depression related.     Surgical Hx:   Oral surgery. Did OK with sedation.  Reglan: " "feels claustrophobic   Lidocaine: fast metabolism    Family Hx: No family history of breast or ovarian cancer.  Mother: RA and uterine polyp that was precancerous.  Narcolepsy.  Grandmother and Great-Aunt: EDS.  MGF: Hypertension, MI, and DM.  Maternal family history of endometriosis.     Social Hx: Occupation: Worked until November 2021, now on SSDI.  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   Relationship status/family: Lives with his partner, Delta, and their roommate. Delta will help with post op cares. Smoking status: None. Alcohol use: None. Diet: Omnivore. High protein and high carb diet due to fast metabolism. Caffeine: 2-3 cans of Coke QD and occasionally coffee. Exercise: PT once a week. Working on pelvic floor and aquatic PT for hypermobility. Occasionally walks. Sleep: 10 hours at bedtime with medication. Methylfenidate. KACY. Baclofen. Propranolol.    PE: General: Height: 5' 3\" Weight: 218 lbs 0 oz   Chest:   Nice upper chest contour.   Chest hair and mild acne.  Bilateral chest striae.  Slight depression of lower sternum.     Grade 2.5 nipple ptosis.   R breast (600 g)  is slightly larger than the L (400 g).   IMFs situated about 2-3 cms below the pec muscle. Incisions may touch at midline.   R IMF situated about 2 lower than the L.   Moderate lateral thoracic rolls.   No anterior axillary folds.    No lymphadenopathy or masses.   Photos taken with consent.     A&P: 26 year old biological female transitioning to male who is a good candidate for gender affirming top surgery with one of the following: bilateral simple mastectomy vs. breast reduction, +/- possible nipple graft reconstruction. He will most likely need a bilateral simple mastectomy with nipple graft reconstruction depending on intraoperative findings and the patient's desired outcome. The patient is interested in nipple grafts. The patient will need a pre-op mammogram in addition to an H&P from their PCP. They will also need a diagnostic " "assessment with their LOS. They may also benefit from a PAC visit given their extensive PMH and \"cataplexy\".    He did not meet with our Transgender Coordinator but they will be in contact via [employer] to discuss communications with staff and timeline. He did receive an introductory folder of information and contact numbers/names. Any further discussion of risks and complications will be reviewed during the pre-op visit.    Patient accepts the risks of this procedure and would like to proceed with surgery. He is able to give informed consent for this medically necessary procedure.   Once we receive his therapist letter of support, DA and mammogram results we will initiate the prior authorization process.     According to [place] Case Law and [facility] standards of care, with an appropriate letter of support from a mental health provider, top surgery/mastectomy is medically necessary for the treatment of gender dysphoria.     Total time = 45 minutes, spent on the date of encounter doing chart review, history and physical, dressing changes, documentation, patient education, and any further activity as noted above.     This note was prepared on behalf of Anita Grant MD by Sanjana Gross, a trained medical scribe, based on my observations and the provider's statements to me.         Anita Grant MD  " Principal Discharge DX:	Enteroviral infection   1

## 2023-05-17 RX ORDER — BLOOD SUGAR DIAGNOSTIC
STRIP MISCELLANEOUS
Qty: 100 STRIP | Refills: 3 | Status: SHIPPED | OUTPATIENT
Start: 2023-05-17 | End: 2024-02-20

## 2023-05-23 ENCOUNTER — VIRTUAL VISIT (OUTPATIENT)
Dept: PSYCHOLOGY | Facility: CLINIC | Age: 28
End: 2023-05-23
Payer: COMMERCIAL

## 2023-05-23 DIAGNOSIS — G89.29 OTHER CHRONIC PAIN: ICD-10-CM

## 2023-05-23 DIAGNOSIS — M79.7 FIBROMYALGIA: ICD-10-CM

## 2023-05-23 DIAGNOSIS — G90.A POTS (POSTURAL ORTHOSTATIC TACHYCARDIA SYNDROME): ICD-10-CM

## 2023-05-23 DIAGNOSIS — F54 PSYCHOLOGICAL FACTORS AFFECTING MEDICAL CONDITION: ICD-10-CM

## 2023-05-23 DIAGNOSIS — E16.2 HYPOGLYCEMIA: ICD-10-CM

## 2023-05-23 DIAGNOSIS — J45.20 MILD INTERMITTENT ASTHMA WITHOUT COMPLICATION: ICD-10-CM

## 2023-05-23 DIAGNOSIS — E16.1 HYPERINSULINEMIA: Primary | ICD-10-CM

## 2023-05-23 DIAGNOSIS — M35.9 CONNECTIVE TISSUE DISORDER (H): ICD-10-CM

## 2023-05-23 PROCEDURE — 96156 HLTH BHV ASSMT/REASSESSMENT: CPT | Mod: 95 | Performed by: PSYCHOLOGIST

## 2023-05-23 ASSESSMENT — ANXIETY QUESTIONNAIRES
GAD7 TOTAL SCORE: 10
6. BECOMING EASILY ANNOYED OR IRRITABLE: MORE THAN HALF THE DAYS
4. TROUBLE RELAXING: MORE THAN HALF THE DAYS
7. FEELING AFRAID AS IF SOMETHING AWFUL MIGHT HAPPEN: SEVERAL DAYS
7. FEELING AFRAID AS IF SOMETHING AWFUL MIGHT HAPPEN: SEVERAL DAYS
8. IF YOU CHECKED OFF ANY PROBLEMS, HOW DIFFICULT HAVE THESE MADE IT FOR YOU TO DO YOUR WORK, TAKE CARE OF THINGS AT HOME, OR GET ALONG WITH OTHER PEOPLE?: VERY DIFFICULT
3. WORRYING TOO MUCH ABOUT DIFFERENT THINGS: MORE THAN HALF THE DAYS
GAD7 TOTAL SCORE: 10
IF YOU CHECKED OFF ANY PROBLEMS ON THIS QUESTIONNAIRE, HOW DIFFICULT HAVE THESE PROBLEMS MADE IT FOR YOU TO DO YOUR WORK, TAKE CARE OF THINGS AT HOME, OR GET ALONG WITH OTHER PEOPLE: VERY DIFFICULT
1. FEELING NERVOUS, ANXIOUS, OR ON EDGE: MORE THAN HALF THE DAYS
2. NOT BEING ABLE TO STOP OR CONTROL WORRYING: SEVERAL DAYS
5. BEING SO RESTLESS THAT IT IS HARD TO SIT STILL: NOT AT ALL

## 2023-05-23 NOTE — PROGRESS NOTES
Health Psychology          Sonali Morris, Ph.D.,  [phone]  Anastasiya Pelayo, Ph.D.,  [phone]  Brandie Daniels, Ph.D.,  [phone]  Melody Lanza, Ph.D., , ABP [phone]  Chin Mai, Ph.D., , ABPP [phone]  Isaura Hatch, Ph.D.,  [phone]  Carla Cunningham, Ph.D., , ABPP [phone]    [facility], 3rd Floor  [address] [place] [address]       HEALTH BEHAVIOR ASSESSMENT    Length of Session: 53 minutes  Start time: 2:00 PM  End time: 2:53 PM     :  ALBERTO    Contact #: [phone]    Referred by: Greta Cannon PA-C    Medical conditions:      1. Hyperinsulinemia    2. Hypoglycemia    3. Fibromyalgia    4. Other chronic pain    5. Connective tissue disorder (H)    6. POTS (postural orthostatic tachycardia syndrome)    7. Mild intermittent asthma without complication    8. Psychological factors affecting medical condition      Telemedicine Information    Telemedicine Visit: The patient's condition can be safely assessed and treated via synchronous audio and visual telemedicine encounter.    Reason for Telemedicine Visit: Patient has requested telehealth visit and Patient convenience (e.g. access to timely appointments / distance to available provider)  Originating Site (Patient Location): Patient's [place], [place]  Distant Location (provider location):  On-site: [facility]  Consent:  The patient/guardian has verbally consented to: the potential risks and benefits of telemedicine (video visit) versus in person care; bill my insurance or make self-payment for services provided; and responsibility for payment of non-covered services.   Mode of Communication:  Video Conference via [employer]  As the provider I attest to compliance with applicable laws and regulations related to telemedicine.     MENTAL STATUS EXAM  Appearance: Appropriate   Eye Contact: Good    Orientation: Yes, x4  Behavior/relationship  "to provider/demeanor: Cooperative, Engaged and Pleasant  Motor Activity: normal or unremarkable  Mood (subjective report): Depressed, Anxious  Affect (objective appearance): Appropriate  Speech Rate: Normal  Speech Volume: Normal  Speech Articulation: Normal  Speech Coherence: Normal  Speech Spontaneity: Normal  Thought Content: endorses periodic passive suicidal ideation, denies active SI, plans, or intent, endorses depressive and anxious cognitions  Thought Process (associations): Logical, Linear and Goal directed  Thought Process (rate): Normal  Abnormal Perception: None  Attention/Concentration: Normal  Memory: Appears grossly intact  Fund of Knowledge: Appears within normal limits  Abstraction:  Normal  Insight: Good  Judgment: Good    IDENTIFYING DATA/REASON FOR REFERRAL  The patient is a 27 year old adult who uses he/they pronouns with a PMH significant for POTS, connective tissue disorder, 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 Was referred for health psychology services by endocrinology provider, Greta Cannon PA-C for assistance with stress in the context of multiple, chronic health conditions during a visit in which he was being evaluated for hypoglycemia.     ASSESSMENT OF HEALTH CONDITION    The patient presented with concerns related to stress in the context of multiple, chronic health conditions. Reports that he is feeling tired today. Notes that they are managing an upper respiratory illness but are feeling a bit better.      Patient's Response to Disease/Illness/Injury: \"there's a lot of self-loathing,\" fear of inconveniencing others, feelings of burdensomeness    Patient's Hominy: discouraged, patient describes \"being given the runaround,\" feels more hopeful with current " "diagnoses, \"I feel like I'm being believed.\"     Patient's Coping Strategies: knitting, patrick, listening to music, cat who serves as an NIMESH, taking a nap/sleep, isolation, withdrawal, PLEASE, HALT, and TIPS skills from DBT    Patient Goals: increasing participation in meaningful and purposeful activities outside of the home, increasing independence, and processing grief and loss associated with change in medical status. May also need to consider referral for couple's therapy in the future.     Motivations: Delta (partner), desire for increased independence/autonomy, desire for vitality, kevin, and enrichment, Cadmium (cat who is NIMESH)    Previous strategies used for change: medication, therapy, heat therapy, physical therapy, adaptability tools    Barriers to change: financial, disability, volume of appointments, access to transit/reliable transportation, distance to appointments, lack of telehealth options    Adherence: reports good adherence, states that he has pill organizers to help remember to take medications, they also have other external memory cues as reminders    Exacerbations of health conditions are associated with: fatigue, travel, stress, sleep deprivation, pain, hunger/changes in glucose, exposure to persistent noise, sudden loud noises (trigger psychogenic seizures)    Changes in functioning reported in: interpersonal, occupational, and academic functioning. Also decline in ability to carry out ADLs.     Lifestyle Risk Screening Tool:    Number of hours of sleep per night = 7-8, fragmented (needs 10 to feel well-rested)  Number of times per day eating sweet/fried/processed foods = 3  Number of 8 ounce servings of sugary beverage per day = one 12oz can of Coca Cola per day (has recently cut back from 3 per day)  Number of fruits and vegetables per day = 2  Number of hours of screen time per day = 7  Number of days per week of physical activity = 3  Minutes of physical activity per active day = 60  How " often around smoking = rarely  How often using tobacco products = none  How many alcoholic drinks in one week = 0-1 (rarely drinks, only drinks on holidays approximately 2x per year)  How many alcoholic drinks in one day = 0-1    Readiness for Change:    Importance level: 9/10  Confidence level: 7/10  Willingness level: 8/10    MENTAL HEALTH SCREENING    Depression Screening - PHQ-9   5/9/2023  10:57 AM   PHQ-9 ([employer])    1. Little interest or pleasure in doing things 1    2. Feeling down, depressed, or hopeless 2    3. Trouble falling or staying asleep, or sleeping too much 1    4. Feeling tired or having little energy 1    5. Poor appetite or overeating 2    6. Feeling bad about yourself - or that you are a failure or have let yourself or your family down 2    7. Trouble concentrating on things, such as reading the newspaper or watching television 1    8. Moving or speaking so slowly that other people could have noticed. Or the opposite - being so fidgety or restless that you have been moving around a lot more than usual 0    9. Thoughts that you would be better off dead, or of hurting yourself in some way 0    PHQ-9 Total Score 10        Mood does appear to be a contributor to problems associated with patient's chronic medical concerns    Anxiety Screening - FUNMILAYO-7:   5/23/2023  1:45 PM   FUNMILAYO-7  [employer], 2002; Used with Permission)    1. Feeling nervous, anxious, or on edge 2    2. Not being able to stop or control worrying 1    3. Worrying too much about different things 2    4. Trouble relaxing 2    5. Being so restless that it is hard to sit still 0    6. Becoming easily annoyed or irritable 2    7. Feeling afraid, as if something awful might happen 1    FUNMILAYO-7 Total Score 10        Anxiety does appear to be a contributor to problems associated with patient's chronic medical concerns    RISK OF HARM TO SELF/OTHERS    Patient endorses persistent, passive suicidal ideation. Patient denies a suicide plan or  "intent.  Patient denies violent/homicidal/aggressive ideation.  Patient denies a plan or intent to harm others.    CHEMICAL USE AND DEPENDENCY    CAGE-AID     5/8/2023  1:05 PM   CAGE-AID Flowsheet    Have you ever felt you should Cut down on your drinking or drug use? 0    Have people Annoyed you by criticizing your drinking or drug use? 0    Have you ever felt bad or Guilty about your drinking or drug use? 0    Have you ever had a drink or used drugs first thing in the morning to steady your nerves or to get rid of a hangover? (Eye opener) 0    CAGE-AID SCORE 0        CAGE-AID reprinted with permission from the [place] Medical Journal, VEE Araujo. and GABE Urias, \"Conjoint screening questionnaires for alcohol and drug abuse\" [place] Medical Journal 94: 135-140, 1995.       CAGE-AID score was 0 indicating negative screen for problematic alcohol or drug use.  Gregg Maharaj currently consumes about 0 alcoholic drinks per day. Patient currently denies illicit street drug use.  Patient currently denies use of nicotine. Patient denies a history of problematic use of prescription medications.      MEDICAL PROBLEMS    Patient Active Problem List    Diagnosis Date Noted     Constipation 10/17/2022     Priority: Medium     Urinary urgency 04/01/2022     Priority: Medium     Narcolepsy and cataplexy 01/28/2022     Priority: Medium     Acute UTI 01/18/2022     Priority: Medium     Panic 12/11/2021     Priority: Medium     Bilateral leg pain 09/16/2021     Priority: Medium     VERNON positive 07/09/2021     Priority: Medium     Formatting of this note might be different from the original.  dense fine speckled pattern with 1:320 titer       Elevated CK 07/09/2021     Priority: Medium     Formatting of this note might be different from the original.  Noted on July 9, 2021.       Dysmenorrhea 01/09/2018     Priority: Medium     Mild intermittent asthma without complication 01/09/2018     Priority: Medium       MEDICATIONS  On " average, patient misses 0-1 doses of medication per week. The patient does not have any current concerns with medications.    Current Outpatient Medications   Medication     ACCU-CHEK GUIDE test strip     albuterol (PROAIR HFA/PROVENTIL HFA/VENTOLIN HFA) 108 (90 Base) MCG/ACT inhaler     azelastine (ASTELIN) 0.1 % nasal spray     baclofen (LIORESAL) 10 MG tablet     buPROPion (WELLBUTRIN XL) 150 MG 24 hr tablet     calcipotriene (DOVONOX) 0.005 % external solution     cetirizine (ZYRTEC) 10 MG tablet     clindamycin-benzoyl peroxide (BENZACLIN) 1-5 % external gel     clotrimazole (LOTRIMIN) 1 % external cream     CONCERTA 54 MG CR tablet     Continuous Blood Gluc Sensor (FREESTYLE JOSSY 2 SENSOR) MISC     diazepam (VALIUM) 5 MG tablet     diphenhydrAMINE (BENADRYL) 25 MG capsule     EPINEPHrine (ANY BX GENERIC EQUIV) 0.3 MG/0.3ML injection 2-pack     estradiol cypionate (DEPO-ESTRADIOL) 5 MG/ML injection     famotidine (PEPCID) 20 MG tablet     gabapentin (NEURONTIN) 300 MG capsule     ibuprofen (ADVIL/MOTRIN) 200 MG capsule     ketoconazole (NIZORAL) 2 % external shampoo     loratadine (CLARITIN) 10 MG tablet     metFORMIN (GLUCOPHAGE) 500 MG tablet     Methylphenidate HCl (RITALIN PO)     minoxidil (LONITEN) 2.5 MG tablet     Minoxidil 5 % FOAM     montelukast (SINGULAIR) 10 MG tablet     OTHER MEDICAL SUPPLIES     oxybutynin ER (DITROPAN XL) 5 MG 24 hr tablet     propranolol ER (INDERAL LA) 160 MG 24 hr capsule     Specialty Vitamins Products (VITAMINS FOR HAIR) CAPS     SUMAtriptan (IMITREX) 50 MG tablet     tacrolimus (PROTOPIC) 0.1 % external ointment     testosterone (ANDROGEL 1.62 % PUMP) 20.25 MG/ACT gel     tretinoin (RETIN-A) 0.05 % external cream     ubrogepant (UBRELVY) 50 MG tablet     venlafaxine (EFFEXOR-ER) 150 MG TB24 24 hr tablet     vitamin D3 (CHOLECALCIFEROL) 10 MCG (400 UNIT) capsule     Current Facility-Administered Medications   Medication     ciprofloxacin (CIPRO) tablet 500 mg        PATIENT EDUCATION  Patient was educated about the effects of stress on health as well as ACT as a therapeutic framework. Patient was given handouts on values clarification.    SUMMARY, RECOMMENDATIONS, & PLAN:      1. Patient to follow-up on 6/13/23 at 2:00pm for health behavior intervention to address stress in the context of multiple medical concerns.  2. Primary care needs and medications are managed by Jessica Cardoso MD at [facility]. Referring provider is Greta Cannon PA-C.   3. Patient to contact [facility], [employer] or other community resources if there was a psychiatric emergency.  4. Next visit: complete quick look at values exercise.        Carla Cunningham, Ph.D., , [facility]  Clinical Health Psychologist  Phone: [phone]   Pager: [phone]  5/23/2023 2:00 PM

## 2023-05-30 ENCOUNTER — TELEPHONE (OUTPATIENT)
Dept: PLASTIC SURGERY | Facility: CLINIC | Age: 28
End: 2023-05-30
Payer: COMMERCIAL

## 2023-05-30 NOTE — TELEPHONE ENCOUNTER
RN Care Coordinator: Jun Ferrara; [phone]    Surgery is scheduled with Dr. Grant   Date: 8/18   Location: [facility]  Scheduled per: next available date    H&P to be completed by Primary Care team; patient to schedule    Surgical consult: 6/27 patient requested     Post-op visit(s): 8/25 with Gely, no post-op request in orders    Patient will receive a phone call from pre-admission nurses 1-2 days prior to surgery with arrival and start time.       Spoke with the patient and was able to confirm all scheduled information.       Patient questions/concerns: N/A       Surgery packet was sent via [employer]    __    Addie Rebolledo, Senior Perioperative Coordinator, on 5/30/2023 at 12:33 PM  P: [phone]

## 2023-05-31 ENCOUNTER — DOCUMENTATION ONLY (OUTPATIENT)
Dept: PLASTIC SURGERY | Facility: CLINIC | Age: 28
End: 2023-05-31
Payer: COMMERCIAL

## 2023-05-31 NOTE — PROGRESS NOTES
[facility] :  Care Coordination Note     SITUATION   Gregg Maharaj is a 27 year old adult who is receiving support for: gender dysphoria.    BACKGROUND     Pt has seen Dr Grant for gender affirming top surgery revision consultation  Pt has surgery scheduled for 8/18/23    ASSESSMENT     Pt has LOS + DA in chart, reviewed by     PLAN     Follow-up plan:    Will contact pt to inform of need for pre-op H&P within 30 days of surgery  Pt to have pre-op surgical consultation with Dr Juanita Ferrara RN

## 2023-06-04 DIAGNOSIS — L73.9 FOLLICULITIS: ICD-10-CM

## 2023-06-05 ENCOUNTER — PRE VISIT (OUTPATIENT)
Dept: ENDOCRINOLOGY | Facility: CLINIC | Age: 28
End: 2023-06-05

## 2023-06-06 RX ORDER — KETOCONAZOLE 20 MG/ML
SHAMPOO TOPICAL EVERY OTHER DAY
Qty: 120 ML | Refills: 3 | Status: SHIPPED | OUTPATIENT
Start: 2023-06-06 | End: 2023-09-18

## 2023-06-06 NOTE — TELEPHONE ENCOUNTER
ketoconazole (NIZORAL) 2 % external shampoo  Last Written Prescription Date:   2/6/2023  Last Fill Quantity: 120,   # refills: 3  Last Office Visit :  3/23/2023  Future Office visit:   6/22/2023  120 mL, 3 Refills sent to haris Wilburn RN  Central Triage Red Flags/Med Refills

## 2023-06-13 ENCOUNTER — VIRTUAL VISIT (OUTPATIENT)
Dept: PSYCHOLOGY | Facility: CLINIC | Age: 28
End: 2023-06-13
Payer: COMMERCIAL

## 2023-06-13 DIAGNOSIS — G89.29 OTHER CHRONIC PAIN: ICD-10-CM

## 2023-06-13 DIAGNOSIS — G90.A POTS (POSTURAL ORTHOSTATIC TACHYCARDIA SYNDROME): ICD-10-CM

## 2023-06-13 DIAGNOSIS — E16.1 HYPERINSULINEMIA: Primary | ICD-10-CM

## 2023-06-13 DIAGNOSIS — M35.9 CONNECTIVE TISSUE DISORDER (H): ICD-10-CM

## 2023-06-13 DIAGNOSIS — M79.7 FIBROMYALGIA: ICD-10-CM

## 2023-06-13 DIAGNOSIS — J45.20 MILD INTERMITTENT ASTHMA WITHOUT COMPLICATION: ICD-10-CM

## 2023-06-13 DIAGNOSIS — E16.2 HYPOGLYCEMIA: ICD-10-CM

## 2023-06-13 DIAGNOSIS — F54 PSYCHOLOGICAL FACTORS AFFECTING MEDICAL CONDITION: ICD-10-CM

## 2023-06-13 PROCEDURE — 96159 HLTH BHV IVNTJ INDIV EA ADDL: CPT | Mod: 95 | Performed by: PSYCHOLOGIST

## 2023-06-13 PROCEDURE — 96158 HLTH BHV IVNTJ INDIV 1ST 30: CPT | Mod: 95 | Performed by: PSYCHOLOGIST

## 2023-06-13 ASSESSMENT — ANXIETY QUESTIONNAIRES
IF YOU CHECKED OFF ANY PROBLEMS ON THIS QUESTIONNAIRE, HOW DIFFICULT HAVE THESE PROBLEMS MADE IT FOR YOU TO DO YOUR WORK, TAKE CARE OF THINGS AT HOME, OR GET ALONG WITH OTHER PEOPLE: SOMEWHAT DIFFICULT
5. BEING SO RESTLESS THAT IT IS HARD TO SIT STILL: SEVERAL DAYS
GAD7 TOTAL SCORE: 5
GAD7 TOTAL SCORE: 5
7. FEELING AFRAID AS IF SOMETHING AWFUL MIGHT HAPPEN: NOT AT ALL
3. WORRYING TOO MUCH ABOUT DIFFERENT THINGS: SEVERAL DAYS
6. BECOMING EASILY ANNOYED OR IRRITABLE: SEVERAL DAYS
2. NOT BEING ABLE TO STOP OR CONTROL WORRYING: NOT AT ALL
1. FEELING NERVOUS, ANXIOUS, OR ON EDGE: SEVERAL DAYS
4. TROUBLE RELAXING: SEVERAL DAYS
8. IF YOU CHECKED OFF ANY PROBLEMS, HOW DIFFICULT HAVE THESE MADE IT FOR YOU TO DO YOUR WORK, TAKE CARE OF THINGS AT HOME, OR GET ALONG WITH OTHER PEOPLE?: SOMEWHAT DIFFICULT
7. FEELING AFRAID AS IF SOMETHING AWFUL MIGHT HAPPEN: NOT AT ALL

## 2023-06-13 NOTE — PROGRESS NOTES
Health Psychology          Sonali Morris, Ph.D.,  [phone]  Anastasiya Pelayo, Ph.D.,  [phone]  Brandie Daniels, Ph.D.,  [phone]  Melody Lanza, Ph.D., , [facility] [phone]  Chin Mai, Ph.D., , [facility] [phone]  Isaura Hatch, Ph.D.,  [phone]  Carla Cunningham, Ph.D., , [facility] [phone]    [facility], 3rd Floor  [address] [place] [address]        Health Behavior Intervention    Length of Session: 48 minutes              Start Time: 2:06 PM              Stop Time: 2:54 PM   : ALBERTO  Type: Follow-up  Session #: 3  Referred by: Greta Cannon PA-C    Telemedicine Information    Telemedicine Visit: The patient's condition can be safely assessed and treated via synchronous audio and visual telemedicine encounter.    Reason for Telemedicine Visit: Patient has requested telehealth visit and Patient convenience (e.g. access to timely appointments / distance to available provider)  Originating Site (Patient Location): Patient's home, [place]  Distant Location (provider location):  On-site: [facility]  Consent:  The patient/guardian has verbally consented to: the potential risks and benefits of telemedicine (video visit) versus in person care; bill my insurance or make self-payment for services provided; and responsibility for payment of non-covered services.   Mode of Communication:  Video Conference via Ribbon  As the provider I attest to compliance with applicable laws and regulations related to telemedicine.     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 Was referred for health psychology services by endocrinology provider, Greta Cannon PA-C for assistance with stress in the context of multiple, chronic health conditions during a visit in which he was being evaluated for hypoglycemia.     Session Content:    Update: Patient reports doing generally well. States that he got a new wheelchair, which has been very helpful for increasing his functioning and mobility. They also noted that their testosterone was adjusted, which seems to have helped with their mood and energy. Patient noted that he has been more active and has noted significant functional improvement with his ability to participate in more chores and activities around the home. He notes that he is feeling a sense of freedom, liberation, and accomplishment. Patient also reports that they are back into physical therapy for knee pain and scoliosis.    Promoting Functional Improvement, Minimizing Barriers, and Management/Coping with Medical Condition(s): Discussed the importance of pacing when increasing activity to ensure easing into greater physical demands and loads. Patient noted that he has been trying to pace himself and will take today as a rest day. Patient also completed his homework assignment. Discussed insights from completing values clarification exercise. Top 6 values identified include: creativity, kindness, mindfulness, persistence, safety, and skillfulness. They note that creativity, kindness, and mindfulness are the values that he feels most distant from. Patient identified a concern about his biological father pursuing contact with him as a deterrent to participating in creative artwork. He notes that he has been processing some of his emotional distress related to his biological father with his current mental health therapist. Explored ways that they can continue to engage in creative expression while also attending to privacy needs. Discussed  "patient's history with mindfulness training. He notes that he has some experience with it and that they often use imagery as part of their practice. Provided psychoeducation about the benefits of mindfulness practice. Introduced ACT principle of committed action and reviewed the Willingness and Action Plan.         Patient Education:    Patient was taught cognitive and behavioral strategies to address lifestyle and behavioral factors associated with medical concerns.    Patient was educated about ACT.      Patient was given handouts on ACT.    Treatment Goals & Progress:      Treatment Goals:   o Increasing participation in meaningful and purposeful activities outside of the home  o Increasing independence  o Processing grief and loss associated with change in medical status    Progress: In progress    Mental Status Exam:  Appearance: Appropriate   Eye Contact: Good    Orientation: Yes, x4  Behavior/relationship to provider/demeanor: Cooperative, Engaged and Pleasant  Motor Activity: normal or unremarkable  Mood (subjective report): \"Better\"  Affect (objective appearance): Appropriate/mood congruent   Speech Rate: Normal  Speech Volume: Normal  Speech Articulation: Normal  Speech Coherence: Normal  Speech Spontaneity: Normal  Thought Content: recently endorsed periodic passive suicidal ideation, no active SI, plans, or intent, endorses depressive and anxious cognitions  Thought Process (associations): Logical, Linear and Goal directed  Thought Process (rate): Normal  Abnormal Perception: None  Attention/Concentration: Normal  Memory: Appears grossly intact  Fund of Knowledge: Appears within normal limits  Abstraction:  Normal  Insight: Good  Judgment: Good    Diagnoses:    1. Hyperinsulinemia    2. Hypoglycemia    3. Fibromyalgia    4. Other chronic pain    5. Connective tissue disorder (H)    6. POTS (postural orthostatic tachycardia syndrome)    7. Mild intermittent asthma without complication    8. Psychological " factors affecting medical condition         Medications:    Current Outpatient Medications   Medication     ACCU-CHEK GUIDE test strip     albuterol (PROAIR HFA/PROVENTIL HFA/VENTOLIN HFA) 108 (90 Base) MCG/ACT inhaler     azelastine (ASTELIN) 0.1 % nasal spray     baclofen (LIORESAL) 10 MG tablet     buPROPion (WELLBUTRIN XL) 150 MG 24 hr tablet     calcipotriene (DOVONOX) 0.005 % external solution     cetirizine (ZYRTEC) 10 MG tablet     clindamycin-benzoyl peroxide (BENZACLIN) 1-5 % external gel     clotrimazole (LOTRIMIN) 1 % external cream     CONCERTA 54 MG CR tablet     Continuous Blood Gluc Sensor (FREESTYLE JOSSY 2 SENSOR) MISC     diazepam (VALIUM) 5 MG tablet     diphenhydrAMINE (BENADRYL) 25 MG capsule     EPINEPHrine (ANY BX GENERIC EQUIV) 0.3 MG/0.3ML injection 2-pack     estradiol cypionate (DEPO-ESTRADIOL) 5 MG/ML injection     famotidine (PEPCID) 20 MG tablet     gabapentin (NEURONTIN) 300 MG capsule     ibuprofen (ADVIL/MOTRIN) 200 MG capsule     ketoconazole (NIZORAL) 2 % external shampoo     loratadine (CLARITIN) 10 MG tablet     metFORMIN (GLUCOPHAGE) 500 MG tablet     Methylphenidate HCl (RITALIN PO)     minoxidil (LONITEN) 2.5 MG tablet     Minoxidil 5 % FOAM     montelukast (SINGULAIR) 10 MG tablet     OTHER MEDICAL SUPPLIES     oxybutynin ER (DITROPAN XL) 5 MG 24 hr tablet     propranolol ER (INDERAL LA) 160 MG 24 hr capsule     Specialty Vitamins Products (VITAMINS FOR HAIR) CAPS     SUMAtriptan (IMITREX) 50 MG tablet     tacrolimus (PROTOPIC) 0.1 % external ointment     testosterone (ANDROGEL 1.62 % PUMP) 20.25 MG/ACT gel     tretinoin (RETIN-A) 0.05 % external cream     ubrogepant (UBRELVY) 50 MG tablet     venlafaxine (EFFEXOR-ER) 150 MG TB24 24 hr tablet     vitamin D3 (CHOLECALCIFEROL) 10 MCG (400 UNIT) capsule     Current Facility-Administered Medications   Medication     ciprofloxacin (CIPRO) tablet 500 mg            Plan:    1. Patient to follow-up on 7/17/23 at 1:00pm [facility]  behavior intervention to address stress in the context of multiple medical concerns.  2. Primary care needs and medications are managed by Jessica Cardoso MD at [facility]. Referring provider is Greta Cannon PA-C.   3. Patient to contact [facility], [facility] or other community resources if there was a psychiatric emergency.  4. Next visit:   a. Introductory mindfulness meditation at next visit  b. Use the willingness and action plan to structure goals and activities that are enriching and values-aligned.    Skills taught/interventions used thus far:    Psychoeducation    Values clarification    Motivational interviewing    Carla Cunningham, Ph.D., , [facility]  Clinical Health Psychologist  Phone: [phone]   Pager: [phone]  6/13/2023 2:06 PM

## 2023-06-22 ENCOUNTER — OFFICE VISIT (OUTPATIENT)
Dept: DERMATOLOGY | Facility: CLINIC | Age: 28
End: 2023-06-22
Payer: COMMERCIAL

## 2023-06-22 DIAGNOSIS — L73.9 FOLLICULITIS: ICD-10-CM

## 2023-06-22 DIAGNOSIS — L74.519 FOCAL HYPERHIDROSIS: Primary | ICD-10-CM

## 2023-06-22 DIAGNOSIS — L70.0 ACNE VULGARIS: ICD-10-CM

## 2023-06-22 DIAGNOSIS — L65.0 TELOGEN EFFLUVIUM: ICD-10-CM

## 2023-06-22 PROCEDURE — 99214 OFFICE O/P EST MOD 30 MIN: CPT | Performed by: DERMATOLOGY

## 2023-06-22 RX ORDER — OXYBUTYNIN CHLORIDE 10 MG/1
10 TABLET, EXTENDED RELEASE ORAL DAILY
Qty: 30 TABLET | Refills: 11 | Status: SHIPPED | OUTPATIENT
Start: 2023-06-22 | End: 2024-08-15

## 2023-06-22 ASSESSMENT — PAIN SCALES - GENERAL: PAINLEVEL: NO PAIN (0)

## 2023-06-22 NOTE — NURSING NOTE
Chief Complaint   Patient presents with     Hair Loss     Pt here today for hair loss.     Jesús Lopes, EMT

## 2023-06-22 NOTE — LETTER
6/22/2023       RE: Gregg Maharaj  [address]  [address]  [place] [address]     Dear Colleague,    Thank you for referring your patient, Gregg Maharaj, to the [facility] DERMATOLOGY [facility]INIC [place] at [facility]. Please see a copy of my visit note below.    [facility] Dermatology Note  Encounter Date: Jun 22, 2023  Office Visit     Dermatology Problem List:  1. Nonscarring alopecia consistent with Telogen effluvium   - Current tx: none   - Prior offered: Rogaine 5% foam (not covered by insurance), minoxidil 1.25 mg (low bp exacerbating POTS)   2. Folliculitis  - ketoconazole 2% shampoo, tretinoin cream  3. Irritant hand dermatitis  - Current tx: tacrolimus ointment  - Prior tx: triamcinolone 0.1% ointment bid  4. Referred to genetics- family hx of EDS  5. Hyperhidrosis   - Current tx: oxybutynin 10 mg     ____________________________________________    Assessment & Plan:     # Nonscarring alopecia consistent with telogen effluvium. Chronic, flare.   Prescribed oral minoxidil at last visit. Unfortunately, pt experienced low/labile blood pressure exacerbating his POTS. Notes some decreased shedding now that they have stopped using as much product in their hair. Does not feel strongly about further treatment at this time.   - Nothing to do at this time   - Future considerations: if becomes further concerned finasteride would be an appropriate option      # Acne vulgaris. Chronic, flare.   Pt sees Monserrat for acne, current treatment includes topical retinoid. Discussed the possibility of Accutane. Counseled that logistically it would be easier for him to start Accutane after his hysterectomy and top surgery revision this summer.   - Follow up with Monserrat for Accutane initiation in September     # Hyperhidrosis  Pt currently takes oxybutynin 5 mg for bladder spasms. Discussed that we could increase oxybutynin for 10 mg. Counseled on the  possible side effects of increased dose, will reach out via MyChart if occurs.   - Start oxybutynin 10 mg     # Dermatitis of scalp and eyebrows, seborrheic   Notes success with keto shampoo regimen.   - Continue keto shampoo    Procedures Performed:   None    Follow-up: 3 month(s) in-person, or earlier for new or changing lesions    Staff and Medical Student:     Gt Willis, MS3  [employer]    Staff Physician:  I was present with the medical student who participated in the service and in the documentation of the note. I have verified the history and personally performed the physical exam and medical decision making. I agree with the assessment and plan of care as documented in the note.     Usama Jackson MD  Pronouns: he/him/his    Department of Dermatology  [facility]: Phone: [phone], Fax:[phone]  [facility]: Phone: [phone] Fax: [phone]  ____________________________________________    CC: Hair Loss (Pt here today for hair loss.)    HPI:  Mr. Gregg Maharaj is a(n) 27 year old adult who presents today as a return patient for telogen effluvium. Last seen March 2023 when started on minoxidil 1.25 mg PO qdaily.     Unfortunately, the pt experienced low bp with minoxidil and was subsequently discontinued. Pt notes that the hair loss seems to be somewhat better now that they have stopped using as many hair products.     Pt shares that they have been seeing Monserrat for acne, however it seems to be persistent. He notes that he has been using his topical retinoids, but still is experiencing cystic painful acne.     Patient is otherwise feeling well, without additional skin concerns.     Labs Reviewed:  N/A    Physical Exam:  Vitals: There were no vitals taken for this visit.  SKIN: Focused examination of scalp, face and back was performed.  -  Inflammatory papules and hyperpigmented macules across the upper to mid back   - Inflammatory papules scattered across the forehead and jaw line   - Hair pull test negative    - No other lesions of concern on areas examined.     Medications:  Current Outpatient Medications   Medication    ACCU-CHEK GUIDE test strip    albuterol (PROAIR HFA/PROVENTIL HFA/VENTOLIN HFA) 108 (90 Base) MCG/ACT inhaler    azelastine (ASTELIN) 0.1 % nasal spray    baclofen (LIORESAL) 10 MG tablet    buPROPion (WELLBUTRIN XL) 150 MG 24 hr tablet    calcipotriene (DOVONOX) 0.005 % external solution    cetirizine (ZYRTEC) 10 MG tablet    clotrimazole (LOTRIMIN) 1 % external cream    CONCERTA 54 MG CR tablet    Continuous Blood Gluc Sensor (FREESTYLE JOSSY 2 SENSOR) MISC    diazepam (VALIUM) 5 MG tablet    diphenhydrAMINE (BENADRYL) 25 MG capsule    EPINEPHrine (ANY BX GENERIC EQUIV) 0.3 MG/0.3ML injection 2-pack    famotidine (PEPCID) 20 MG tablet    gabapentin (NEURONTIN) 300 MG capsule    ibuprofen (ADVIL/MOTRIN) 200 MG capsule    ketoconazole (NIZORAL) 2 % external shampoo    loratadine (CLARITIN) 10 MG tablet    metFORMIN (GLUCOPHAGE) 500 MG tablet    Methylphenidate HCl (RITALIN PO)    montelukast (SINGULAIR) 10 MG tablet    oxybutynin ER (DITROPAN XL) 10 MG 24 hr tablet    oxybutynin ER (DITROPAN XL) 5 MG 24 hr tablet    propranolol ER (INDERAL LA) 160 MG 24 hr capsule    tacrolimus (PROTOPIC) 0.1 % external ointment    testosterone (ANDROGEL 1.62 % PUMP) 20.25 MG/ACT gel    tretinoin (RETIN-A) 0.05 % external cream    ubrogepant (UBRELVY) 50 MG tablet    venlafaxine (EFFEXOR-ER) 150 MG TB24 24 hr tablet    clindamycin-benzoyl peroxide (BENZACLIN) 1-5 % external gel    estradiol cypionate (DEPO-ESTRADIOL) 5 MG/ML injection    minoxidil (LONITEN) 2.5 MG tablet    Minoxidil 5 % FOAM    OTHER MEDICAL SUPPLIES    Specialty Vitamins Products (VITAMINS FOR HAIR) CAPS    SUMAtriptan (IMITREX) 50 MG tablet    vitamin D3 (CHOLECALCIFEROL) 10 MCG  (400 UNIT) capsule     Current Facility-Administered Medications   Medication    ciprofloxacin (CIPRO) tablet 500 mg      Past Medical History:   Patient Active Problem List   Diagnosis    Acute UTI    VERNON positive    Bilateral leg pain    Dysmenorrhea    Elevated CK    Mild intermittent asthma without complication    Narcolepsy and cataplexy    Panic    Urinary urgency    Constipation     Past Medical History:   Diagnosis Date    ADHD (attention deficit hyperactivity disorder)     VERNON positive     Anxiety     Asthma     Borderline personality disorder (H)     Concussion     Depression     Fibromyalgia     Gastroesophageal reflux disease with esophagitis     Gender dysphoria in adolescent and adult     Hypermobility of joint     Hypersomnia     Migraine     Mucous retention cyst of maxillary sinus     Obesity     PTSD (post-traumatic stress disorder)     Seasonal allergic rhinitis     Urinary frequency        CC Jessica Cardoso  [address]  [address]  [place] [address] on close of this encounter.

## 2023-06-22 NOTE — PROGRESS NOTES
[facility] Dermatology Note  Encounter Date: Jun 22, 2023  Office Visit     Dermatology Problem List:  1. Nonscarring alopecia consistent with Telogen effluvium   - Current tx: none   - Prior offered: Rogaine 5% foam (not covered by insurance), minoxidil 1.25 mg (low bp exacerbating POTS)   2. Folliculitis  - ketoconazole 2% shampoo, tretinoin cream  3. Irritant hand dermatitis  - Current tx: tacrolimus ointment  - Prior tx: triamcinolone 0.1% ointment bid  4. Referred to genetics- family hx of EDS  5. Hyperhidrosis   - Current tx: oxybutynin 10 mg     ____________________________________________    Assessment & Plan:     # Nonscarring alopecia consistent with telogen effluvium. Chronic, flare.   Prescribed oral minoxidil at last visit. Unfortunately, pt experienced low/labile blood pressure exacerbating his POTS. Notes some decreased shedding now that they have stopped using as much product in their hair. Does not feel strongly about further treatment at this time.   - Nothing to do at this time   - Future considerations: if becomes further concerned finasteride would be an appropriate option      # Acne vulgaris. Chronic, flare.   Pt sees Monserrat for acne, current treatment includes topical retinoid. Discussed the possibility of Accutane. Counseled that logistically it would be easier for him to start Accutane after his hysterectomy and top surgery revision this summer.   - Follow up with Monserrat for Accutane initiation in September     # Hyperhidrosis  Pt currently takes oxybutynin 5 mg for bladder spasms. Discussed that we could increase oxybutynin for 10 mg. Counseled on the possible side effects of increased dose, will reach out via MyChart if occurs.   - Start oxybutynin 10 mg     # Dermatitis of scalp and eyebrows, seborrheic   Notes success with keto shampoo regimen.   - Continue keto shampoo    Procedures Performed:   None    Follow-up: 3 month(s) in-person, or earlier for new or changing  lesions    Staff and Medical Student:     Gt Willis, MS3  [employer]    Staff Physician:  I was present with the medical student who participated in the service and in the documentation of the note. I have verified the history and personally performed the physical exam and medical decision making. I agree with the assessment and plan of care as documented in the note.     Usama Jackson MD  Pronouns: he/him/his    Department of Dermatology  [facility]: Phone: [phone], Fax:[phone]  [facility]: Phone: [phone] Fax: [phone]  ____________________________________________    CC: Hair Loss (Pt here today for hair loss.)    HPI:  Mr. Gregg Maharaj is a(n) 27 year old adult who presents today as a return patient for telogen effluvium. Last seen March 2023 when started on minoxidil 1.25 mg PO qdaily.     Unfortunately, the pt experienced low bp with minoxidil and was subsequently discontinued. Pt notes that the hair loss seems to be somewhat better now that they have stopped using as many hair products.     Pt shares that they have been seeing Monserrat for acne, however it seems to be persistent. He notes that he has been using his topical retinoids, but still is experiencing cystic painful acne.     Patient is otherwise feeling well, without additional skin concerns.     Labs Reviewed:  N/A    Physical Exam:  Vitals: There were no vitals taken for this visit.  SKIN: Focused examination of scalp, face and back was performed.  - Inflammatory papules and hyperpigmented macules across the upper to mid back   - Inflammatory papules scattered across the forehead and jaw line   - Hair pull test negative    - No other lesions of concern on areas examined.     Medications:  Current Outpatient Medications   Medication     ACCU-CHEK GUIDE test strip     albuterol  (PROAIR HFA/PROVENTIL HFA/VENTOLIN HFA) 108 (90 Base) MCG/ACT inhaler     azelastine (ASTELIN) 0.1 % nasal spray     baclofen (LIORESAL) 10 MG tablet     buPROPion (WELLBUTRIN XL) 150 MG 24 hr tablet     calcipotriene (DOVONOX) 0.005 % external solution     cetirizine (ZYRTEC) 10 MG tablet     clotrimazole (LOTRIMIN) 1 % external cream     CONCERTA 54 MG CR tablet     Continuous Blood Gluc Sensor (FREESTYLE JOSSY 2 SENSOR) MISC     diazepam (VALIUM) 5 MG tablet     diphenhydrAMINE (BENADRYL) 25 MG capsule     EPINEPHrine (ANY BX GENERIC EQUIV) 0.3 MG/0.3ML injection 2-pack     famotidine (PEPCID) 20 MG tablet     gabapentin (NEURONTIN) 300 MG capsule     ibuprofen (ADVIL/MOTRIN) 200 MG capsule     ketoconazole (NIZORAL) 2 % external shampoo     loratadine (CLARITIN) 10 MG tablet     metFORMIN (GLUCOPHAGE) 500 MG tablet     Methylphenidate HCl (RITALIN PO)     montelukast (SINGULAIR) 10 MG tablet     oxybutynin ER (DITROPAN XL) 10 MG 24 hr tablet     oxybutynin ER (DITROPAN XL) 5 MG 24 hr tablet     propranolol ER (INDERAL LA) 160 MG 24 hr capsule     tacrolimus (PROTOPIC) 0.1 % external ointment     testosterone (ANDROGEL 1.62 % PUMP) 20.25 MG/ACT gel     tretinoin (RETIN-A) 0.05 % external cream     ubrogepant (UBRELVY) 50 MG tablet     venlafaxine (EFFEXOR-ER) 150 MG TB24 24 hr tablet     clindamycin-benzoyl peroxide (BENZACLIN) 1-5 % external gel     estradiol cypionate (DEPO-ESTRADIOL) 5 MG/ML injection     minoxidil (LONITEN) 2.5 MG tablet     Minoxidil 5 % FOAM     OTHER MEDICAL SUPPLIES     Specialty Vitamins Products (VITAMINS FOR HAIR) CAPS     SUMAtriptan (IMITREX) 50 MG tablet     vitamin D3 (CHOLECALCIFEROL) 10 MCG (400 UNIT) capsule     Current Facility-Administered Medications   Medication     ciprofloxacin (CIPRO) tablet 500 mg      Past Medical History:   Patient Active Problem List   Diagnosis     Acute UTI     VERNON positive     Bilateral leg pain     Dysmenorrhea     Elevated CK     Mild  intermittent asthma without complication     Narcolepsy and cataplexy     Panic     Urinary urgency     Constipation     Past Medical History:   Diagnosis Date     ADHD (attention deficit hyperactivity disorder)      VERNON positive      Anxiety      Asthma      Borderline personality disorder (H)      Concussion      Depression      Fibromyalgia      Gastroesophageal reflux disease with esophagitis      Gender dysphoria in adolescent and adult      Hypermobility of joint      Hypersomnia      Migraine      Mucous retention cyst of maxillary sinus      Obesity      PTSD (post-traumatic stress disorder)      Seasonal allergic rhinitis      Urinary frequency        CC Jessica Cardoso  [address]  [address]  [place] [address] on close of this encounter.

## 2023-07-17 ENCOUNTER — VIRTUAL VISIT (OUTPATIENT)
Dept: PSYCHOLOGY | Facility: CLINIC | Age: 28
End: 2023-07-17
Payer: COMMERCIAL

## 2023-07-17 DIAGNOSIS — M79.7 FIBROMYALGIA: ICD-10-CM

## 2023-07-17 DIAGNOSIS — G89.29 OTHER CHRONIC PAIN: ICD-10-CM

## 2023-07-17 DIAGNOSIS — E16.2 HYPOGLYCEMIA: ICD-10-CM

## 2023-07-17 DIAGNOSIS — G90.A POTS (POSTURAL ORTHOSTATIC TACHYCARDIA SYNDROME): ICD-10-CM

## 2023-07-17 DIAGNOSIS — E16.1 HYPERINSULINEMIA: Primary | ICD-10-CM

## 2023-07-17 DIAGNOSIS — F54 PSYCHOLOGICAL FACTORS AFFECTING MEDICAL CONDITION: ICD-10-CM

## 2023-07-17 DIAGNOSIS — M35.9 CONNECTIVE TISSUE DISORDER (H): ICD-10-CM

## 2023-07-17 DIAGNOSIS — J45.20 MILD INTERMITTENT ASTHMA WITHOUT COMPLICATION: ICD-10-CM

## 2023-07-17 PROCEDURE — 96158 HLTH BHV IVNTJ INDIV 1ST 30: CPT | Mod: VID | Performed by: PSYCHOLOGIST

## 2023-07-17 PROCEDURE — 96159 HLTH BHV IVNTJ INDIV EA ADDL: CPT | Mod: VID | Performed by: PSYCHOLOGIST

## 2023-07-17 NOTE — PROGRESS NOTES
Health Psychology          Sonali Morris, Ph.D.,  [phone]  Anastasiya Pelayo, Ph.D.,  [phone]  Brandie Daniels, Ph.D.,  [phone]  Melody Lanza, Ph.D., , [facility] [phone]  Chin Mai, Ph.D., , [facility] [phone]  Isaura Hatch, Ph.D.,  [phone]  Carla Cunningham, Ph.D., , [facility] [phone]    [facility], 3rd Floor  [address] [place] [address]        Health Behavior Intervention    Length of Session: 54 minutes              Start Time: 1:00 PM              Stop Time: 1:54 PM    : ALBERTO  Type: Follow-up  Session #: 4  Referred by: Greta Cannon PA-C    Telemedicine Information    Telemedicine Visit: The patient's condition can be safely assessed and treated via synchronous audio and visual telemedicine encounter.    Reason for Telemedicine Visit: Patient has requested telehealth visit and Patient convenience (e.g. access to timely appointments / distance to available provider)  Originating Site (Patient Location): Patient's home, [place]  Distant Location (provider location): Off-site: Provider's home office  Consent:  The patient/guardian has verbally consented to: the potential risks and benefits of telemedicine (video visit) versus in person care; bill my insurance or make self-payment for services provided; and responsibility for payment of non-covered services.   Mode of Communication:  Video Conference via [employer]  As the provider I attest to compliance with applicable laws and regulations related to telemedicine.     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 Was referred for health psychology services by endocrinology provider, Greta Cannon PA-C for assistance with stress in the context of multiple, chronic health conditions during a visit in which he was being evaluated for hypoglycemia.     Session Content:    Update: Patient reports navigating through some ongoing medical stressors, which they are navigating through skillfully. Notes that he has been struggling with some insomnia, which is likely multifactorial. States that he has a hysterectomy and top surgery revision, which has him a little nervous and is disrupting his sleep. Also notes that he has been staying up late playing video games. Reports that they also were struggling with uterine pain, which was keeping them up at night.      Promoting Functional Improvement, Minimizing Barriers, and Management/Coping with Medical Condition(s): Patient did not complete the willingness and action plan. Reviewed this exercise and how to complete it. Mutually agreed that patient will carry over to the next visit. Provided psychoeducation about the benefits of mindfulness practice. Briefly discussed the attitudinal foundations of mindfulness practice. Complete introductory mindfulness meditation. Discussed insights. Patient noted that visualization is a main component of his experience.        Top 6 values identified include: creativity, kindness, mindfulness, persistence, safety, and skillfulness. They note that creativity, kindness, and mindfulness are the values that he feels most distant from.    Patient Education:  Patient was taught cognitive and behavioral strategies to address lifestyle and behavioral factors associated with medical concerns.  Patient was educated about mindfulness, ACT.    Patient was given handouts on mindfulness.    Treatment Goals & Progress:    Treatment Goals:   Increasing participation in meaningful and purposeful activities outside of the  "home  Increasing independence  Processing grief and loss associated with change in medical status  Progress: In progress    Mental Status Exam:  Appearance: Appropriate   Eye Contact: Good    Orientation: Yes, x4  Behavior/relationship to provider/demeanor: Cooperative, Engaged and Pleasant  Motor Activity: normal or unremarkable  Mood (subjective report): \"okay\"  Affect (objective appearance): Appropriate/mood congruent   Speech Rate: Normal  Speech Volume: Normal  Speech Articulation: Normal  Speech Coherence: Normal  Speech Spontaneity: Normal  Thought Content: recently endorsed periodic passive suicidal ideation, no active SI, plans, or intent, endorses depressive and anxious cognitions  Thought Process (associations): Logical, Linear and Goal directed  Thought Process (rate): Normal  Abnormal Perception: None  Attention/Concentration: Normal  Memory: Appears grossly intact  Fund of Knowledge: Appears within normal limits  Abstraction:  Normal  Insight: Good  Judgment: Good    Diagnoses:    1. Hyperinsulinemia    2. Hypoglycemia    3. Fibromyalgia    4. Other chronic pain    5. Connective tissue disorder (H)    6. POTS (postural orthostatic tachycardia syndrome)    7. Mild intermittent asthma without complication    8. Psychological factors affecting medical condition         Medications:    Current Outpatient Medications   Medication    ACCU-CHEK GUIDE test strip    albuterol (PROAIR HFA/PROVENTIL HFA/VENTOLIN HFA) 108 (90 Base) MCG/ACT inhaler    azelastine (ASTELIN) 0.1 % nasal spray    baclofen (LIORESAL) 10 MG tablet    buPROPion (WELLBUTRIN XL) 150 MG 24 hr tablet    calcipotriene (DOVONOX) 0.005 % external solution    cetirizine (ZYRTEC) 10 MG tablet    clindamycin-benzoyl peroxide (BENZACLIN) 1-5 % external gel    clotrimazole (LOTRIMIN) 1 % external cream    CONCERTA 54 MG CR tablet    Continuous Blood Gluc Sensor (FREESTYLE JOSSY 2 SENSOR) MISC    diazepam (VALIUM) 5 MG tablet    diphenhydrAMINE " (BENADRYL) 25 MG capsule    EPINEPHrine (ANY BX GENERIC EQUIV) 0.3 MG/0.3ML injection 2-pack    estradiol cypionate (DEPO-ESTRADIOL) 5 MG/ML injection    famotidine (PEPCID) 20 MG tablet    gabapentin (NEURONTIN) 300 MG capsule    ibuprofen (ADVIL/MOTRIN) 200 MG capsule    ketoconazole (NIZORAL) 2 % external shampoo    loratadine (CLARITIN) 10 MG tablet    metFORMIN (GLUCOPHAGE) 500 MG tablet    Methylphenidate HCl (RITALIN PO)    minoxidil (LONITEN) 2.5 MG tablet    Minoxidil 5 % FOAM    montelukast (SINGULAIR) 10 MG tablet    OTHER MEDICAL SUPPLIES    oxybutynin ER (DITROPAN XL) 10 MG 24 hr tablet    oxybutynin ER (DITROPAN XL) 5 MG 24 hr tablet    propranolol ER (INDERAL LA) 160 MG 24 hr capsule    Specialty Vitamins Products (VITAMINS FOR HAIR) CAPS    SUMAtriptan (IMITREX) 50 MG tablet    tacrolimus (PROTOPIC) 0.1 % external ointment    testosterone (ANDROGEL 1.62 % PUMP) 20.25 MG/ACT gel    tretinoin (RETIN-A) 0.05 % external cream    ubrogepant (UBRELVY) 50 MG tablet    venlafaxine (EFFEXOR-ER) 150 MG TB24 24 hr tablet    vitamin D3 (CHOLECALCIFEROL) 10 MCG (400 UNIT) capsule     Current Facility-Administered Medications   Medication    ciprofloxacin (CIPRO) tablet 500 mg       Plan:    Patient to follow-up on 8/15/23 at 1:00pm for continued health behavior intervention to address stress in the context of multiple medical concerns.  Primary care needs and medications are managed by Jessica Cardoso MD at [facility]. Referring provider is Greta Cannon PA-C.   Patient to contact [facility], [facility] or other community resources if there was a psychiatric emergency.  Next visit:   Practice each of the meditations at least 2x per week  Read the attitudinal foundations of mindfulness practice.   Use the willingness and action plan to structure goals and activities that are enriching and values-aligned. Consider using this as a mechanism for structuring revisiting [address]    Skills  taught/interventions used thus far:  Psychoeducation  Values clarification  Motivational interviewing  Mindfulness (breath, body scan)    Carla Cunningham, Ph.D., , [facility]  Clinical Health Psychologist  Phone: [phone]   Pager: [phone]

## 2023-07-25 ENCOUNTER — OFFICE VISIT (OUTPATIENT)
Dept: PLASTIC SURGERY | Facility: CLINIC | Age: 28
End: 2023-07-25
Payer: COMMERCIAL

## 2023-07-25 VITALS
HEART RATE: 66 BPM | BODY MASS INDEX: 35.52 KG/M2 | WEIGHT: 213.2 LBS | HEIGHT: 65 IN | OXYGEN SATURATION: 97 % | SYSTOLIC BLOOD PRESSURE: 117 MMHG | DIASTOLIC BLOOD PRESSURE: 76 MMHG

## 2023-07-25 DIAGNOSIS — F64.0 GENDER DYSPHORIA IN ADULT: Primary | ICD-10-CM

## 2023-07-25 PROCEDURE — 99214 OFFICE O/P EST MOD 30 MIN: CPT | Performed by: SURGERY

## 2023-07-25 ASSESSMENT — PAIN SCALES - GENERAL: PAINLEVEL: MILD PAIN (2)

## 2023-07-25 NOTE — LETTER
"7/25/2023       RE: Gregg Maharaj  [facility]  [address]  [place] [address]     Dear Colleague,    Thank you for referring your patient, Gregg Maharaj, to the [facility] [place] at [facility]. Please see a copy of my visit note below.    PLASTICS PRE-OP  This is a 28 year old trans adult (he/they) who presents for a scheduled pre-op visit prior to bilateral simple mastectomy revision scheduled for 8/18/2023. He is here today with his partner Yuan. His letter of support from   JESSICA Zhao  has been received. History and physical were completed yesterday. They also have a hysterectomy scheduled for tomorrow.     Gregg questions whether their nipple could have \"collapsed\" after they stretched wrong given their history of ED since the L nipple has more projection, possibly from from hypertrophic scarring. After a manual exam the internal structure of the scar does not feel collapsed. We reviewed the specific parts of their existing results for which they are interested in revision.     This includes: R lateral attenuated scar and \"droopiness\" along with less droopiness on L side. Elimination of \"divot\" between both incisions at midline, even if the incisions need to meet in middle. Downsizing of larger R NG, particularly central nipple with trapped hairs and fluid and less definition/projection. Possible lowering of L NG to match R side.     Gregg states that they are self-conscious about the coloration of their nipples. We discussed that during their first pre-op visit the calico nature of the nipples can happen due to the thinning process to ensure graft survival. This can also happen with nipple projection. If they are still unhappy with the pigmentation after everything is healed, they will be able to have touch-up tattooing. [facility] is well known for working with the trans community.  " Gregg wants to make sure that only the central nipple on the right side should be revised, versus the right areola, which he says he is fine with. Left nipple can be left alone.     Our RN, Donald, discussed periop instructions with the patient including: not eating anything 8 hours prior to surgery, drinking clear liquids up to 2 hours before surgery except for morning medications with a sip of water, the preop shower with surgical soap which was given, and wearing a button- or zip-up shirt on the day of surgery. The patient was also instructed to avoid NSAIDs x 1 week both before AND after surgery, but he may take Tylenol post-op for pain as needed. The patient was provided with a folder of information on kp-op topics, including where the surgery will be.     I discussed the following with the patient; preop, intraop and postop phases of care on the day of surgery, the placement of a bladder catheter during surgery that will likely be removed in recovery, postop cares and limitations with relation to home and work settings, 5-lb weight restriction for the first 3 weeks postop, and how long to maintain limited activities. We also discussed Zofran, oxycodone, Z-jamar, and antipruritics which will be prescribed. We discussed that preventing constipation will be their responsibility, and we discussed methods such as aloe, prune juice or Miralax.     In addition, we went over the possible risks and complications involved with this elective procedure. These include but are not limited to: infection, bleeding, hematoma/seroma formation, and poor healing (including dehiscence, nipple graft loss, or hypertrophic scarring). We also discussed the possibility of altered chest sensation (either hypo or hypersensitive), residual deformities and asymmetries, possible further surgical revisions, and possible injury to surrounding neurovascular and musculoskeletal structures, including intra-axillary or intra-thoracic. We lastly  discussed anesthetic risks including DVT/PE or cardiopulmonary events.      The patient once again expressed their disappointment about their results, and we reminded Gregg and their partner that we will do our best to make things better, but cannot promise that things will be perfectly symmetrical, just as we discussed for his first surgery. Results may also vary given their diagnosis of EDS. We cannot guarantee that this revision will completely resolve their dysphoria.     All questions were answered. Gregg will bring any other questions that arise to the day of surgery.  We will review and refine our final plan with the patient when we are doing our preop marking.    Total time = 30 minutes, spent on the date of encounter doing chart review, history and physical, dressing changes, documentation, patient education, and any further activity as noted above.     This note was prepared on behalf of Anita Grant MD by Jennifer Dominguez (they/them), a trained medical scribe, based on my observations and the provider's statements to me.            Again, thank you for allowing me to participate in the care of your patient.      Sincerely,    Anita Grant MD

## 2023-07-25 NOTE — PROGRESS NOTES
"PLASTICS PRE-OP  This is a 28 year old trans adult (he/they) who presents for a scheduled pre-op visit prior to bilateral simple mastectomy revision scheduled for 8/18/2023. He is here today with his partner Yuan. His letter of support from   JESSICA Zhao  has been received. History and physical were completed yesterday. They also have a hysterectomy scheduled for tomorrow.     Gregg questions whether their nipple could have \"collapsed\" after they stretched wrong given their history of ED since the L nipple has more projection, possibly from from hypertrophic scarring. After a manual exam the internal structure of the scar does not feel collapsed. We reviewed the specific parts of their existing results for which they are interested in revision.     This includes: R lateral attenuated scar and \"droopiness\" along with less droopiness on L side. Elimination of \"divot\" between both incisions at midline, even if the incisions need to meet in middle. Downsizing of larger R NG, particularly central nipple with trapped hairs and fluid and less definition/projection. Possible lowering of L NG to match R side.     Gregg states that they are self-conscious about the coloration of their nipples. We discussed that during their first pre-op visit the calico nature of the nipples can happen due to the thinning process to ensure graft survival. This can also happen with nipple projection. If they are still unhappy with the pigmentation after everything is healed, they will be able to have touch-up tattooing. [facility] is well known for working with the trans community.  Gregg wants to make sure that only the central nipple on the right side should be revised, versus the right areola, which he says he is fine with. Left nipple can be left alone.     Our RN, Donald, discussed periop instructions with the patient including: not eating anything 8 hours prior to surgery, drinking clear liquids up to 2 hours before " surgery except for morning medications with a sip of water, the preop shower with surgical soap which was given, and wearing a button- or zip-up shirt on the day of surgery. The patient was also instructed to avoid NSAIDs x 1 week both before AND after surgery, but he may take Tylenol post-op for pain as needed. The patient was provided with a folder of information on kp-op topics, including where the surgery will be.     I discussed the following with the patient; preop, intraop and postop phases of care on the day of surgery, the placement of a bladder catheter during surgery that will likely be removed in recovery, postop cares and limitations with relation to home and work settings, 5-lb weight restriction for the first 3 weeks postop, and how long to maintain limited activities. We also discussed Zofran, oxycodone, Z-jamar, and antipruritics which will be prescribed. We discussed that preventing constipation will be their responsibility, and we discussed methods such as aloe, prune juice or Miralax.     In addition, we went over the possible risks and complications involved with this elective procedure. These include but are not limited to: infection, bleeding, hematoma/seroma formation, and poor healing (including dehiscence, nipple graft loss, or hypertrophic scarring). We also discussed the possibility of altered chest sensation (either hypo or hypersensitive), residual deformities and asymmetries, possible further surgical revisions, and possible injury to surrounding neurovascular and musculoskeletal structures, including intra-axillary or intra-thoracic. We lastly discussed anesthetic risks including DVT/PE or cardiopulmonary events.      The patient once again expressed their disappointment about their results, and we reminded Gregg and their partner that we will do our best to make things better, but cannot promise that things will be perfectly symmetrical, just as we discussed for his first surgery.  Results may also vary given their diagnosis of EDS. We cannot guarantee that this revision will completely resolve their dysphoria.     All questions were answered. Gregg will bring any other questions that arise to the day of surgery.  We will review and refine our final plan with the patient when we are doing our preop marking.    Total time = 30 minutes, spent on the date of encounter doing chart review, history and physical, dressing changes, documentation, patient education, and any further activity as noted above.     This note was prepared on behalf of Anita Grant MD by Jennifer Dominguez (they/them), a trained medical scribe, based on my observations and the provider's statements to me.

## 2023-07-25 NOTE — NURSING NOTE
"Chief Complaint   Patient presents with    RECHECK     Mohsenne is being seen today for a pre-op consult DOS 8/18/23.       Vitals:    07/25/23 1432   BP: 117/76   BP Location: Left arm   Patient Position: Sitting   Cuff Size: Adult Large   Pulse: 66   SpO2: 97%   Weight: 96.7 kg (213 lb 3.2 oz)   Height: 1.651 m (5' 5\")       Body mass index is 35.48 kg/m .    Chin Justice, EMT    "

## 2023-08-15 ENCOUNTER — VIRTUAL VISIT (OUTPATIENT)
Dept: PSYCHOLOGY | Facility: CLINIC | Age: 28
End: 2023-08-15
Payer: COMMERCIAL

## 2023-08-15 DIAGNOSIS — J45.20 MILD INTERMITTENT ASTHMA WITHOUT COMPLICATION: ICD-10-CM

## 2023-08-15 DIAGNOSIS — M79.7 FIBROMYALGIA: ICD-10-CM

## 2023-08-15 DIAGNOSIS — F54 PSYCHOLOGICAL FACTORS AFFECTING MEDICAL CONDITION: ICD-10-CM

## 2023-08-15 DIAGNOSIS — G90.A POTS (POSTURAL ORTHOSTATIC TACHYCARDIA SYNDROME): ICD-10-CM

## 2023-08-15 DIAGNOSIS — E16.2 HYPOGLYCEMIA: ICD-10-CM

## 2023-08-15 DIAGNOSIS — G89.29 OTHER CHRONIC PAIN: ICD-10-CM

## 2023-08-15 DIAGNOSIS — M35.9 CONNECTIVE TISSUE DISORDER (H): ICD-10-CM

## 2023-08-15 DIAGNOSIS — E16.1 HYPERINSULINEMIA: Primary | ICD-10-CM

## 2023-08-15 PROCEDURE — 96159 HLTH BHV IVNTJ INDIV EA ADDL: CPT | Mod: VID | Performed by: PSYCHOLOGIST

## 2023-08-15 PROCEDURE — 96158 HLTH BHV IVNTJ INDIV 1ST 30: CPT | Mod: VID | Performed by: PSYCHOLOGIST

## 2023-08-15 ASSESSMENT — ANXIETY QUESTIONNAIRES
7. FEELING AFRAID AS IF SOMETHING AWFUL MIGHT HAPPEN: NOT AT ALL
GAD7 TOTAL SCORE: 7
4. TROUBLE RELAXING: MORE THAN HALF THE DAYS
1. FEELING NERVOUS, ANXIOUS, OR ON EDGE: MORE THAN HALF THE DAYS
6. BECOMING EASILY ANNOYED OR IRRITABLE: SEVERAL DAYS
3. WORRYING TOO MUCH ABOUT DIFFERENT THINGS: NOT AT ALL
2. NOT BEING ABLE TO STOP OR CONTROL WORRYING: MORE THAN HALF THE DAYS
IF YOU CHECKED OFF ANY PROBLEMS ON THIS QUESTIONNAIRE, HOW DIFFICULT HAVE THESE PROBLEMS MADE IT FOR YOU TO DO YOUR WORK, TAKE CARE OF THINGS AT HOME, OR GET ALONG WITH OTHER PEOPLE: SOMEWHAT DIFFICULT
5. BEING SO RESTLESS THAT IT IS HARD TO SIT STILL: NOT AT ALL
GAD7 TOTAL SCORE: 7

## 2023-08-15 NOTE — PROGRESS NOTES
Health Psychology          Sonali Morris, Ph.D.,  [phone]  Anastasiya Pelayo, Ph.D.,  [phone]  Brandie Daniels, Ph.D.,  [phone]  Melody Lanza, Ph.D., , [facility] [phone]  Chin Mai, Ph.D., , [facility] [phone]  Isaura Hatch, Ph.D.,  [phone]  Carla Cunningham, Ph.D., , [facility] [phone]    [facility], 3rd Floor  [address] [place] [address]        Health Behavior Intervention    Length of Session: 48 minutes              Start Time: 1:01 PM              Stop Time: 1:49 PM     : ALBERTO  Type: Follow-up  Session #: 5  Referred by: Greta Cannon PA-C    Telemedicine Information    Telemedicine Visit: The patient's condition can be safely assessed and treated via synchronous audio and visual telemedicine encounter.    Reason for Telemedicine Visit: Patient has requested telehealth visit and Patient convenience (e.g. access to timely appointments / distance to available provider)  Originating Site (Patient Location): Patient's home, [place]  Distant Location (provider location): On-site: [facility]  Consent:  The patient/guardian has verbally consented to: the potential risks and benefits of telemedicine (video visit) versus in person care; bill my insurance or make self-payment for services provided; and responsibility for payment of non-covered services.   Mode of Communication:  Video Conference via StepUp  As the provider I attest to compliance with applicable laws and regulations related to telemedicine.     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 Disp: 1 Inhaler Rfl: 6   [DISCONTINUED] lansoprazole (PREVACID) 30 MG Oral Capsule Delayed Release Take 1 Cap by mouth daily.  Disp: 30 Cap Rfl: 11 seizures, and borderline personality disorder. Was referred for health psychology services by endocrinology provider, Greta Cannon PA-C for assistance with stress in the context of multiple, chronic health conditions during a visit in which he was being evaluated for hypoglycemia.     Session Content:    Update: Patient reports doing generally well. Recently had their hysterectomy. States that the recovery is going well, but notes that they had an allergic reaction to the surgical glue. States that they are treating the allergic reaction with benadryl and topical corticosteroids. Notes that they are in less pain post hysterectomy and feels glad that they went through with the surgery. Patient also reports some ongoing stress related to disability approval.       Promoting Functional Improvement, Minimizing Barriers, and Management/Coping with Medical Condition(s): Patient reports that he started revisiting a book that he wanted to re-read. He notes that it is giving him an opportunity to revisit something that helps him process his creativity and use creativity as a means of expressing his values. Patient used the willingness and action plan to structure his goal around reading and to identify strategies for overcoming barriers. He was also able to use these skills for managing his distress around having his SSDI denied. He notes that this enabled him to avoid a psychogenic/non-epileptic seizure and that his functional symptoms did not escalate. Patient did not complete the formal meditations. Provided additional psychoeducation about the benefits of mindfulness practice and the impact that mindfulness can have on pain management. He notes that he does have a history of benefiting from the body scan in the past. Assisted patient in generating committed action goals. Patient noted that he would like to increase the number of times he gets out of the house to 3x per week and continuing to read his book, The Artist's  1. Sob,fatigue,Palpitations,chest pain; seems like anxiety more than anything; however, has never had stress test; will ask [facility] to evaluate  2.  HTN-resume home meds    Quality:  · DVT Prophylaxis: lovenox  · CODE status: full  · Maradiaga: no    Plan of ca Way and continue expanding his activities with getting out of the house.           Top 6 values identified include: creativity, kindness, mindfulness, persistence, safety, and skillfulness. They note that creativity, kindness, and mindfulness are the values that he feels most distant from.    Patient Education:  Patient was taught cognitive and behavioral strategies to address lifestyle and behavioral factors associated with medical concerns.  Patient was educated about mindfulness.    Patient was given handouts on mindfulness.    Treatment Goals & Progress:    Treatment Goals:   Increasing participation in meaningful and purposeful activities outside of the home  Increasing independence  Processing grief and loss associated with change in medical status  Progress: In progress    Mental Status Exam:  Appearance: Appropriate   Eye Contact: Good    Orientation: Yes, x4  Behavior/relationship to provider/demeanor: Cooperative, Engaged and Pleasant  Motor Activity: normal or unremarkable  Mood (subjective report): Good  Affect (objective appearance): Appropriate/mood congruent   Speech Rate: Normal  Speech Volume: Normal  Speech Articulation: Normal  Speech Coherence: Normal  Speech Spontaneity: Normal  Thought Content: recently endorsed periodic passive suicidal ideation, no active SI, plans, or intent, endorses depressive and anxious cognitions  Thought Process (associations): Logical, Linear and Goal directed  Thought Process (rate): Normal  Abnormal Perception: None  Attention/Concentration: Normal  Memory: Appears grossly intact  Fund of Knowledge: Appears within normal limits  Abstraction:  Normal  Insight: Good  Judgment: Good    Diagnoses:    1. Hyperinsulinemia    2. Hypoglycemia    3. Fibromyalgia    4. Other chronic pain    5. Connective tissue disorder (H)    6. POTS (postural orthostatic tachycardia syndrome)    7. Mild intermittent asthma without complication    8. Psychological factors affecting medical  condition         Medications:    Current Outpatient Medications   Medication    ACCU-CHEK GUIDE test strip    albuterol (PROAIR HFA/PROVENTIL HFA/VENTOLIN HFA) 108 (90 Base) MCG/ACT inhaler    azelastine (ASTELIN) 0.1 % nasal spray    baclofen (LIORESAL) 10 MG tablet    buPROPion (WELLBUTRIN XL) 150 MG 24 hr tablet    calcipotriene (DOVONOX) 0.005 % external solution    cetirizine (ZYRTEC) 10 MG tablet    clindamycin-benzoyl peroxide (BENZACLIN) 1-5 % external gel    clotrimazole (LOTRIMIN) 1 % external cream    CONCERTA 54 MG CR tablet    Continuous Blood Gluc Sensor (FREESTYLE JOSSY 2 SENSOR) MISC    diazepam (VALIUM) 5 MG tablet    diphenhydrAMINE (BENADRYL) 25 MG capsule    EPINEPHrine (ANY BX GENERIC EQUIV) 0.3 MG/0.3ML injection 2-pack    estradiol cypionate (DEPO-ESTRADIOL) 5 MG/ML injection    famotidine (PEPCID) 20 MG tablet    gabapentin (NEURONTIN) 300 MG capsule    ibuprofen (ADVIL/MOTRIN) 200 MG capsule    ketoconazole (NIZORAL) 2 % external shampoo    loratadine (CLARITIN) 10 MG tablet    metFORMIN (GLUCOPHAGE) 500 MG tablet    Methylphenidate HCl (RITALIN PO)    minoxidil (LONITEN) 2.5 MG tablet    Minoxidil 5 % FOAM    montelukast (SINGULAIR) 10 MG tablet    OTHER MEDICAL SUPPLIES    oxybutynin ER (DITROPAN XL) 10 MG 24 hr tablet    oxybutynin ER (DITROPAN XL) 5 MG 24 hr tablet    propranolol ER (INDERAL LA) 160 MG 24 hr capsule    Specialty Vitamins Products (VITAMINS FOR HAIR) CAPS    SUMAtriptan (IMITREX) 50 MG tablet    tacrolimus (PROTOPIC) 0.1 % external ointment    testosterone (ANDROGEL 1.62 % PUMP) 20.25 MG/ACT gel    tretinoin (RETIN-A) 0.05 % external cream    ubrogepant (UBRELVY) 50 MG tablet    venlafaxine (EFFEXOR-ER) 150 MG TB24 24 hr tablet    vitamin D3 (CHOLECALCIFEROL) 10 MCG (400 UNIT) capsule     Current Facility-Administered Medications   Medication    ciprofloxacin (CIPRO) tablet 500 mg       Plan:    Patient to follow-up on 9/18/23 at 1:00pm for continued health behavior  intervention to address stress in the context of multiple medical concerns.  Primary care needs and medications are managed by Jessica Cardoso MD at [facility]. Referring provider is Greta Cannon PA-C.   Patient to contact [facility], [facility] or other community resources if there was a psychiatric emergency.  Next visit:   Practice each of the meditations at least 2x  Watch attitudinal foundations of mindfulness video.   Continue to read through The Artist's Way at own pace  Get out of the house at least 3x per week.    Skills taught/interventions used thus far:  Psychoeducation  Values clarification  Motivational interviewing  Willingness and action plan  Mindfulness (breath, body scan)    Carla Cunningham, Ph.D., , [facility]  Clinical Health Psychologist  Phone: [phone]   Pager: [phone]

## 2023-08-17 ENCOUNTER — ANESTHESIA EVENT (OUTPATIENT)
Dept: SURGERY | Facility: CLINIC | Age: 28
End: 2023-08-17
Payer: COMMERCIAL

## 2023-08-17 ASSESSMENT — ENCOUNTER SYMPTOMS: SEIZURES: 0

## 2023-08-17 NOTE — ANESTHESIA PREPROCEDURE EVALUATION
Anesthesia Pre-Procedure Evaluation    Patient: Gregg Maharaj   MRN: [id number] : 1995        Procedure : Procedure(s):  REVISION, SCAR, BREAST - s/p bilateral transgender mastectomy (incisions, dogears, nipple grafts)          Past Medical History:   Diagnosis Date    ADHD (attention deficit hyperactivity disorder)     VERNON positive     Anxiety     Asthma     Borderline personality disorder (H)     Concussion     Depression     Fibromyalgia     Gastroesophageal reflux disease with esophagitis     Gender dysphoria in adolescent and adult     Hypermobility of joint     Hypersomnia     Migraine     Mucous retention cyst of maxillary sinus     Obesity     PTSD (post-traumatic stress disorder)     Seasonal allergic rhinitis     Urinary frequency       Past Surgical History:   Procedure Laterality Date    Direct microlaryngoscopy with biopsy  2022    TRANSGENDER MASTECTOMY Bilateral 2022    Procedure: MASTECTOMY, BILATERAL, SIMPLE, nipple grafts. OnQ;  Surgeon: Anita Grant MD;  Location: [facility] OR      Allergies   Allergen Reactions    Capsaicin Anaphylaxis    Capsicum Annuum Extract & Derivative (Bell Pepper) [Capsicum] Anaphylaxis    Food Shortness Of Breath     Red chili peppers (harissa paste)    No Clinical Screening - See Comments Anaphylaxis, Dermatitis, Hives, Itching, Rash and Shortness Of Breath     Cotton seed oil  Cotton seed oil    Adhesive Tape      Other reaction(s): Atopic Dermatitis ALSO DERMABOND - PT HAD HIVES  Other reaction(s): Atopic Dermatitis  Other reaction(s): Atopic Dermatitis  ALSO PT REACTED TO ABSORBABLE SUTURES - BODY REJECTION, PUS, SWELLING AND REDNESS.    Kiwi     Other Environmental Allergy Hives     Cotton seed oil    Tomato     Metoclopramide Anxiety     Makes pt feel claustrophobic      Social History     Tobacco Use    Smoking status: Never     Passive exposure: Never    Smokeless tobacco: Never   Substance Use Topics    Alcohol use: Not on file      Wt  Readings from Last 1 Encounters:   07/25/23 96.7 kg (213 lb 3.2 oz)        Anesthesia Evaluation   Pt has had prior anesthetic. Type: General.    History of anesthetic complications  - .  slow awakening after mastectomy in 2022 and hypoxic events in PACU.    ROS/MED HX  ENT/Pulmonary: Comment: Sinus mucous retention cyst    Vocal cord dysfunction    (+)           allergic rhinitis,         Intermittent, asthma  Treatment: Inhaler prn and Oral steroids,                 Neurologic: Comment: Concussion 8/9/22    Physiogenic non epileptic events    (+)      migraines,                       (-) no seizures, no CVA and no TIA   Cardiovascular: Comment: H/o POTS - neg cardiovascular ROS   (+)  - -   -  - -                                 Previous cardiac testing   Echo: Date: Results:    Stress Test:  Date: Results:    ECG Reviewed:  Date: 12/8/21 Results:  SB  Cath:  Date: Results:      METS/Exercise Tolerance: 4 - Raking leaves, gardening    Hematologic:  - neg hematologic  ROS     Musculoskeletal: Comment: VERNON positive    Fibromyalgia    Hypermobility of joints: hands, knees, elbows and hips      GI/Hepatic:     (+) GERD, Asymptomatic on medication,                  Renal/Genitourinary: Comment: Pelvic floor dysfunction    Urinary urgency/ frequency     interstitial cystitis       Endo: Comment: H/o hyperinsulinemia of unclear etiology. Had a BG monitor for a while for recurrent hypoglycemia. Not using it currently,      (+)               Obesity,       Psychiatric/Substance Use:     (+) psychiatric history anxiety, depression and other (comment) (hypersomnia, ADHD, PTSD, borderline personality disorder)       Infectious Disease:  - neg infectious disease ROS     Malignancy:  - neg malignancy ROS     Other:  - neg other ROS          Physical Exam    Airway        Mallampati: II   TM distance: > 3 FB   Neck ROM: full   Mouth opening: > 3 cm    Respiratory Devices and Support         Dental       (+) Completely normal  teeth      Cardiovascular   cardiovascular exam normal          Pulmonary   pulmonary exam normal                OUTSIDE LABS:  CBC:   Lab Results   Component Value Date    WBC 8.0 12/15/2022    WBC 8.2 07/21/2022    HGB 15.5 12/15/2022    HGB 16.4 07/21/2022    HCT 46.9 12/15/2022    HCT 49.3 07/21/2022     12/15/2022     07/21/2022     BMP:   Lab Results   Component Value Date    CR 0.8 02/14/2022     (H) 04/12/2023     (H) 09/26/2022     COAGS:   Lab Results   Component Value Date    INR 0.99 07/21/2022     POC: No results found for: BGM, HCG, HCGS  HEPATIC: No results found for: ALBUMIN, PROTTOTAL, ALT, AST, GGT, ALKPHOS, BILITOTAL, BILIDIRECT, SAIRA  OTHER:   Lab Results   Component Value Date    TSH 1.92 04/12/2023    SED 4 07/21/2022       Anesthesia Plan    ASA Status:  2    NPO Status:  NPO Appropriate    Anesthesia Type: General.     - Airway: ETT   Induction: Intravenous, Propofol.   Maintenance: Balanced.   Techniques and Equipment:     - Airway: Video-Laryngoscope     - Lines/Monitors: BIS     Consents    Anesthesia Plan(s) and associated risks, benefits, and realistic alternatives discussed. Questions answered and patient/representative(s) expressed understanding.     - Discussed:     - Discussed with:  Patient, Parent (Mother and/or Father)            Postoperative Care    Pain management: IV analgesics, Oral pain medications, Multi-modal analgesia.   PONV prophylaxis: Ondansetron (or other 5HT-3), Dexamethasone or Solumedrol, Background Propofol Infusion     Comments:           H&P reviewed: Unable to attach H&P to encounter due to EHR limitations. H&P Update: appropriate H&P reviewed, patient examined. No interval changes since H&P (within 30 days).         Shu Tierney MD

## 2023-08-18 ENCOUNTER — ANESTHESIA (OUTPATIENT)
Dept: SURGERY | Facility: CLINIC | Age: 28
End: 2023-08-18
Payer: COMMERCIAL

## 2023-08-18 ENCOUNTER — HOSPITAL ENCOUNTER (OUTPATIENT)
Facility: CLINIC | Age: 28
Discharge: HOME | End: 2023-08-18
Attending: SURGERY | Admitting: SURGERY
Payer: COMMERCIAL

## 2023-08-18 VITALS
SYSTOLIC BLOOD PRESSURE: 119 MMHG | WEIGHT: 206.79 LBS | HEART RATE: 84 BPM | TEMPERATURE: 97.7 F | RESPIRATION RATE: 18 BRPM | HEIGHT: 65 IN | BODY MASS INDEX: 34.45 KG/M2 | DIASTOLIC BLOOD PRESSURE: 72 MMHG | OXYGEN SATURATION: 96 %

## 2023-08-18 DIAGNOSIS — F64.9 GENDER DYSPHORIA: Primary | ICD-10-CM

## 2023-08-18 LAB
GLUCOSE BLDC GLUCOMTR-MCNC: 100 MG/DL (ref 70–99)
GLUCOSE BLDC GLUCOMTR-MCNC: 102 MG/DL (ref 70–99)
GLUCOSE BLDC GLUCOMTR-MCNC: 113 MG/DL (ref 70–99)
GLUCOSE BLDC GLUCOMTR-MCNC: 84 MG/DL (ref 70–99)

## 2023-08-18 PROCEDURE — 272N000001 HC OR GENERAL SUPPLY STERILE: Performed by: SURGERY

## 2023-08-18 PROCEDURE — 82962 GLUCOSE BLOOD TEST: CPT

## 2023-08-18 PROCEDURE — 710N000012 HC RECOVERY PHASE 2, PER MINUTE: Performed by: SURGERY

## 2023-08-18 PROCEDURE — 250N000009 HC RX 250: Performed by: SURGERY

## 2023-08-18 PROCEDURE — 258N000003 HC RX IP 258 OP 636

## 2023-08-18 PROCEDURE — 370N000017 HC ANESTHESIA TECHNICAL FEE, PER MIN: Performed by: SURGERY

## 2023-08-18 PROCEDURE — 250N000011 HC RX IP 250 OP 636: Performed by: SURGERY

## 2023-08-18 PROCEDURE — 710N000009 HC RECOVERY PHASE 1, LEVEL 1, PER MIN: Performed by: SURGERY

## 2023-08-18 PROCEDURE — 250N000025 HC SEVOFLURANE, PER MIN: Performed by: SURGERY

## 2023-08-18 PROCEDURE — 250N000009 HC RX 250

## 2023-08-18 PROCEDURE — 19350 NIPPLE/AREOLA RECONSTRUCTION: CPT | Mod: RT | Performed by: SURGERY

## 2023-08-18 PROCEDURE — 250N000013 HC RX MED GY IP 250 OP 250 PS 637: Performed by: ANESTHESIOLOGY

## 2023-08-18 PROCEDURE — 250N000011 HC RX IP 250 OP 636: Mod: JZ

## 2023-08-18 PROCEDURE — 250N000011 HC RX IP 250 OP 636: Performed by: NURSE ANESTHETIST, CERTIFIED REGISTERED

## 2023-08-18 PROCEDURE — 250N000009 HC RX 250: Performed by: NURSE ANESTHETIST, CERTIFIED REGISTERED

## 2023-08-18 PROCEDURE — 250N000011 HC RX IP 250 OP 636: Mod: JZ | Performed by: NURSE ANESTHETIST, CERTIFIED REGISTERED

## 2023-08-18 PROCEDURE — 999N000141 HC STATISTIC PRE-PROCEDURE NURSING ASSESSMENT: Performed by: SURGERY

## 2023-08-18 PROCEDURE — 360N000075 HC SURGERY LEVEL 2, PER MIN: Performed by: SURGERY

## 2023-08-18 PROCEDURE — 250N000013 HC RX MED GY IP 250 OP 250 PS 637

## 2023-08-18 PROCEDURE — 250N000011 HC RX IP 250 OP 636

## 2023-08-18 PROCEDURE — 11406 EXC TR-EXT B9+MARG >4.0 CM: CPT | Mod: 22 | Performed by: SURGERY

## 2023-08-18 RX ORDER — SODIUM CHLORIDE, SODIUM LACTATE, POTASSIUM CHLORIDE, CALCIUM CHLORIDE 600; 310; 30; 20 MG/100ML; MG/100ML; MG/100ML; MG/100ML
INJECTION, SOLUTION INTRAVENOUS CONTINUOUS
Status: DISCONTINUED | OUTPATIENT
Start: 2023-08-18 | End: 2023-08-18 | Stop reason: HOSPADM

## 2023-08-18 RX ORDER — FENTANYL CITRATE 50 UG/ML
INJECTION, SOLUTION INTRAMUSCULAR; INTRAVENOUS PRN
Status: DISCONTINUED | OUTPATIENT
Start: 2023-08-18 | End: 2023-08-18

## 2023-08-18 RX ORDER — ONDANSETRON 4 MG/1
4 TABLET, ORALLY DISINTEGRATING ORAL EVERY 8 HOURS PRN
Qty: 12 TABLET | Refills: 0 | Status: SHIPPED | OUTPATIENT
Start: 2023-08-18 | End: 2024-08-14

## 2023-08-18 RX ORDER — ONDANSETRON 4 MG/1
4 TABLET, ORALLY DISINTEGRATING ORAL EVERY 30 MIN PRN
Status: DISCONTINUED | OUTPATIENT
Start: 2023-08-18 | End: 2023-08-18 | Stop reason: HOSPADM

## 2023-08-18 RX ORDER — DEXAMETHASONE SODIUM PHOSPHATE 4 MG/ML
INJECTION, SOLUTION INTRA-ARTICULAR; INTRALESIONAL; INTRAMUSCULAR; INTRAVENOUS; SOFT TISSUE PRN
Status: DISCONTINUED | OUTPATIENT
Start: 2023-08-18 | End: 2023-08-18

## 2023-08-18 RX ORDER — HYDROMORPHONE HYDROCHLORIDE 1 MG/ML
0.2 INJECTION, SOLUTION INTRAMUSCULAR; INTRAVENOUS; SUBCUTANEOUS EVERY 5 MIN PRN
Status: DISCONTINUED | OUTPATIENT
Start: 2023-08-18 | End: 2023-08-18 | Stop reason: HOSPADM

## 2023-08-18 RX ORDER — ONDANSETRON 2 MG/ML
4 INJECTION INTRAMUSCULAR; INTRAVENOUS EVERY 30 MIN PRN
Status: DISCONTINUED | OUTPATIENT
Start: 2023-08-18 | End: 2023-08-18 | Stop reason: HOSPADM

## 2023-08-18 RX ORDER — FENTANYL CITRATE 50 UG/ML
50 INJECTION, SOLUTION INTRAMUSCULAR; INTRAVENOUS EVERY 5 MIN PRN
Status: DISCONTINUED | OUTPATIENT
Start: 2023-08-18 | End: 2023-08-18 | Stop reason: HOSPADM

## 2023-08-18 RX ORDER — FENTANYL CITRATE 50 UG/ML
25 INJECTION, SOLUTION INTRAMUSCULAR; INTRAVENOUS EVERY 5 MIN PRN
Status: DISCONTINUED | OUTPATIENT
Start: 2023-08-18 | End: 2023-08-18 | Stop reason: HOSPADM

## 2023-08-18 RX ORDER — HYDROMORPHONE HYDROCHLORIDE 1 MG/ML
0.4 INJECTION, SOLUTION INTRAMUSCULAR; INTRAVENOUS; SUBCUTANEOUS EVERY 5 MIN PRN
Status: DISCONTINUED | OUTPATIENT
Start: 2023-08-18 | End: 2023-08-18 | Stop reason: HOSPADM

## 2023-08-18 RX ORDER — CEFAZOLIN SODIUM/WATER 2 G/20 ML
2 SYRINGE (ML) INTRAVENOUS
Status: COMPLETED | OUTPATIENT
Start: 2023-08-18 | End: 2023-08-18

## 2023-08-18 RX ORDER — ONDANSETRON 2 MG/ML
INJECTION INTRAMUSCULAR; INTRAVENOUS PRN
Status: DISCONTINUED | OUTPATIENT
Start: 2023-08-18 | End: 2023-08-18

## 2023-08-18 RX ORDER — HYDROCODONE BITARTRATE AND ACETAMINOPHEN 5; 325 MG/1; MG/1
1 TABLET ORAL
Status: COMPLETED | OUTPATIENT
Start: 2023-08-18 | End: 2023-08-18

## 2023-08-18 RX ORDER — LIDOCAINE HYDROCHLORIDE 20 MG/ML
INJECTION, SOLUTION INFILTRATION; PERINEURAL PRN
Status: DISCONTINUED | OUTPATIENT
Start: 2023-08-18 | End: 2023-08-18

## 2023-08-18 RX ORDER — CEFAZOLIN SODIUM/WATER 2 G/20 ML
2 SYRINGE (ML) INTRAVENOUS SEE ADMIN INSTRUCTIONS
Status: DISCONTINUED | OUTPATIENT
Start: 2023-08-18 | End: 2023-08-18 | Stop reason: HOSPADM

## 2023-08-18 RX ORDER — LIDOCAINE 40 MG/G
CREAM TOPICAL
Status: DISCONTINUED | OUTPATIENT
Start: 2023-08-18 | End: 2023-08-18 | Stop reason: HOSPADM

## 2023-08-18 RX ORDER — ACETAMINOPHEN 325 MG/1
975 TABLET ORAL ONCE
Status: COMPLETED | OUTPATIENT
Start: 2023-08-18 | End: 2023-08-18

## 2023-08-18 RX ORDER — HYDROCODONE BITARTRATE AND ACETAMINOPHEN 5; 325 MG/1; MG/1
1 TABLET ORAL EVERY 6 HOURS PRN
Qty: 12 TABLET | Refills: 0 | Status: SHIPPED | OUTPATIENT
Start: 2023-08-18 | End: 2023-08-21

## 2023-08-18 RX ORDER — DEXMEDETOMIDINE HYDROCHLORIDE 4 UG/ML
INJECTION, SOLUTION INTRAVENOUS PRN
Status: DISCONTINUED | OUTPATIENT
Start: 2023-08-18 | End: 2023-08-18

## 2023-08-18 RX ORDER — PROPOFOL 10 MG/ML
INJECTION, EMULSION INTRAVENOUS PRN
Status: DISCONTINUED | OUTPATIENT
Start: 2023-08-18 | End: 2023-08-18

## 2023-08-18 RX ORDER — KETOROLAC TROMETHAMINE 30 MG/ML
INJECTION, SOLUTION INTRAMUSCULAR; INTRAVENOUS PRN
Status: DISCONTINUED | OUTPATIENT
Start: 2023-08-18 | End: 2023-08-18

## 2023-08-18 RX ADMIN — DEXMEDETOMIDINE 6 MCG: 100 INJECTION, SOLUTION, CONCENTRATE INTRAVENOUS at 15:34

## 2023-08-18 RX ADMIN — Medication 2 G: at 13:23

## 2023-08-18 RX ADMIN — SODIUM CHLORIDE, POTASSIUM CHLORIDE, SODIUM LACTATE AND CALCIUM CHLORIDE: 600; 310; 30; 20 INJECTION, SOLUTION INTRAVENOUS at 12:53

## 2023-08-18 RX ADMIN — Medication 10 MG: at 14:40

## 2023-08-18 RX ADMIN — HYDROMORPHONE HYDROCHLORIDE 0.25 MG: 1 INJECTION, SOLUTION INTRAMUSCULAR; INTRAVENOUS; SUBCUTANEOUS at 15:16

## 2023-08-18 RX ADMIN — PHENYLEPHRINE HYDROCHLORIDE 100 MCG: 10 INJECTION INTRAVENOUS at 13:11

## 2023-08-18 RX ADMIN — DEXMEDETOMIDINE 8 MCG: 100 INJECTION, SOLUTION, CONCENTRATE INTRAVENOUS at 13:49

## 2023-08-18 RX ADMIN — MIDAZOLAM 2 MG: 1 INJECTION INTRAMUSCULAR; INTRAVENOUS at 13:00

## 2023-08-18 RX ADMIN — SUGAMMADEX 200 MG: 100 INJECTION, SOLUTION INTRAVENOUS at 16:58

## 2023-08-18 RX ADMIN — FENTANYL CITRATE 50 MCG: 50 INJECTION, SOLUTION INTRAMUSCULAR; INTRAVENOUS at 17:37

## 2023-08-18 RX ADMIN — HYDROMORPHONE HYDROCHLORIDE 0.25 MG: 1 INJECTION, SOLUTION INTRAMUSCULAR; INTRAVENOUS; SUBCUTANEOUS at 16:16

## 2023-08-18 RX ADMIN — PHENYLEPHRINE HYDROCHLORIDE 100 MCG: 10 INJECTION INTRAVENOUS at 14:31

## 2023-08-18 RX ADMIN — LIDOCAINE HYDROCHLORIDE 60 MG: 20 INJECTION, SOLUTION INFILTRATION; PERINEURAL at 13:03

## 2023-08-18 RX ADMIN — Medication 60 MG: at 13:10

## 2023-08-18 RX ADMIN — FENTANYL CITRATE 25 MCG: 50 INJECTION, SOLUTION INTRAMUSCULAR; INTRAVENOUS at 13:06

## 2023-08-18 RX ADMIN — HYDROMORPHONE HYDROCHLORIDE 0.2 MG: 1 INJECTION, SOLUTION INTRAMUSCULAR; INTRAVENOUS; SUBCUTANEOUS at 17:58

## 2023-08-18 RX ADMIN — HYDROMORPHONE HYDROCHLORIDE 0.2 MG: 1 INJECTION, SOLUTION INTRAMUSCULAR; INTRAVENOUS; SUBCUTANEOUS at 17:53

## 2023-08-18 RX ADMIN — HYDROMORPHONE HYDROCHLORIDE 0.2 MG: 1 INJECTION, SOLUTION INTRAMUSCULAR; INTRAVENOUS; SUBCUTANEOUS at 18:07

## 2023-08-18 RX ADMIN — KETOROLAC TROMETHAMINE 15 MG: 30 INJECTION, SOLUTION INTRAMUSCULAR at 16:56

## 2023-08-18 RX ADMIN — DEXMEDETOMIDINE 6 MCG: 100 INJECTION, SOLUTION, CONCENTRATE INTRAVENOUS at 16:02

## 2023-08-18 RX ADMIN — SODIUM CHLORIDE, POTASSIUM CHLORIDE, SODIUM LACTATE AND CALCIUM CHLORIDE: 600; 310; 30; 20 INJECTION, SOLUTION INTRAVENOUS at 17:10

## 2023-08-18 RX ADMIN — PROPOFOL 170 MG: 10 INJECTION, EMULSION INTRAVENOUS at 13:05

## 2023-08-18 RX ADMIN — ONDANSETRON 4 MG: 2 INJECTION INTRAMUSCULAR; INTRAVENOUS at 16:58

## 2023-08-18 RX ADMIN — PROPOFOL 30 MG: 10 INJECTION, EMULSION INTRAVENOUS at 13:06

## 2023-08-18 RX ADMIN — HYDROCODONE BITARTRATE AND ACETAMINOPHEN 1 TABLET: 5; 325 TABLET ORAL at 18:59

## 2023-08-18 RX ADMIN — HYDROMORPHONE HYDROCHLORIDE 0.2 MG: 1 INJECTION, SOLUTION INTRAMUSCULAR; INTRAVENOUS; SUBCUTANEOUS at 18:15

## 2023-08-18 RX ADMIN — DEXAMETHASONE SODIUM PHOSPHATE 4 MG: 4 INJECTION, SOLUTION INTRA-ARTICULAR; INTRALESIONAL; INTRAMUSCULAR; INTRAVENOUS; SOFT TISSUE at 13:26

## 2023-08-18 RX ADMIN — Medication 20 MG: at 14:06

## 2023-08-18 RX ADMIN — FENTANYL CITRATE 25 MCG: 50 INJECTION, SOLUTION INTRAMUSCULAR; INTRAVENOUS at 14:04

## 2023-08-18 RX ADMIN — FENTANYL CITRATE 50 MCG: 50 INJECTION, SOLUTION INTRAMUSCULAR; INTRAVENOUS at 14:06

## 2023-08-18 RX ADMIN — ACETAMINOPHEN 975 MG: 325 TABLET ORAL at 12:06

## 2023-08-18 ASSESSMENT — ACTIVITIES OF DAILY LIVING (ADL)
ADLS_ACUITY_SCORE: 35

## 2023-08-18 NOTE — ANESTHESIA CARE TRANSFER NOTE
Patient: Gregg Maharaj    Procedure: Procedure(s):  REVISION, SCAR, BREAST - s/p bilateral transgender mastectomy (incisions, dogears, nipple revision)       Diagnosis: Gender dysphoria in adult [F64.0]  Diagnosis Additional Information: No value filed.    Anesthesia Type:   General     Note:    Oropharynx: oral airway in place and spontaneously breathing  Level of Consciousness: iatrogenic sedation  Oxygen Supplementation: face mask  Level of Supplemental Oxygen (L/min / FiO2): 6  Independent Airway: airway patency satisfactory and stable  Dentition: dentition unchanged  Vital Signs Stable: post-procedure vital signs reviewed and stable  Report to RN Given: handoff report given  Patient transferred to: PACU    Handoff Report: Identifed the Patient, Identified the Reponsible Provider, Reviewed the pertinent medical history, Discussed the surgical course, Reviewed Intra-OP anesthesia mangement and issues during anesthesia, Set expectations for post-procedure period and Allowed opportunity for questions and acknowledgement of understanding      Vitals:  Vitals Value Taken Time   BP 88/51 08/18/23 1720   Temp 36.3  C (97.3  F) 08/18/23 1720   Pulse 57 08/18/23 1725   Resp 14 08/18/23 1725   SpO2 100 % 08/18/23 1725   Vitals shown include unvalidated device data.    Electronically Signed By: CA Worrell CRNA  August 18, 2023  5:26 PM

## 2023-08-18 NOTE — ANESTHESIA POSTPROCEDURE EVALUATION
Patient: Gregg Maharaj    Procedure: Procedure(s):  REVISION, SCAR, BREAST - s/p bilateral transgender mastectomy (incisions, dogears, nipple revision)       Anesthesia Type:  General    Note:  Disposition: Outpatient   Postop Pain Control: Uneventful            Sign Out: Well controlled pain   PONV: No   Neuro/Psych: Uneventful            Sign Out: Acceptable/Baseline neuro status   Airway/Respiratory: Uneventful            Sign Out: Acceptable/Baseline resp. status   CV/Hemodynamics: Uneventful            Sign Out: Acceptable CV status; No obvious hypovolemia; No obvious fluid overload   Other NRE:    DID A NON-ROUTINE EVENT OCCUR?     Event details/Postop Comments:  Doing well. Alert, oriented. Did not experience prolonged emergence. No sore throat or nausea. Patient denies any anesthesia-related concerns.                Last vitals:  Vitals Value Taken Time   /74 08/18/23 1830   Temp 36.5  C (97.7  F) 08/18/23 1800   Pulse 83 08/18/23 1836   Resp 10 08/18/23 1836   SpO2 97 % 08/18/23 1838   Vitals shown include unvalidated device data.    Electronically Signed By: Bre Thomson MD  August 18, 2023  6:41 PM

## 2023-08-18 NOTE — OR NURSING
4 Lap sites from recent hysterectomy appear red and swollen. 3 of the sites are open with yellow drainage. Pt reports allergy/hypersensitivity to adhesives, sutures, and dermabond. Updated Dr. Grant who came to bedside to assess.

## 2023-08-18 NOTE — DISCHARGE INSTRUCTIONS
Immediately report the following to Dr. Grant's clinic:    Redness, warmth, swelling, or tenderness at the site the tubing was inserted  Increase in pain  Fever, chills, sweats  Bowel or bladder changes  Difficulty breathing  Dizziness, lightheadedness  Blurred vision  Ringing or buzzing in your ears  Metal taste in your mouth  Numbness and/or tingling around your mouth, fingers or toes  Drowsiness  Confusion  Trouble removing the tubing  Dark tip is not present when tubing is removed       During daytime hours call Dr. Grant's plastic surgery clinic RN at [phone] or main office at [phone]     After hours and weekends: [phone] and ask for the Resident On Call for Plastic Surgeon         Same-Day Surgery   Adult Discharge Orders & Instructions     For 24 hours after surgery:  Get plenty of rest.  A responsible adult must stay with you for at least 24 hours after you leave the hospital.   Pain medication can slow your reflexes. Do not drive or use heavy equipment.  If you have weakness or tingling, don't drive or use heavy equipment until this feeling goes away.  Mixing alcohol and pain medication can cause dizziness and slow your breathing. It can even be fatal. Do not drink alcohol while taking pain medication.  Avoid strenuous or risky activities.  Ask for help when climbing stairs.   You may feel lightheaded.  If so, sit for a few minutes before standing.  Have someone help you get up.   If you have nausea (feel sick to your stomach), drink only clear liquids such as apple juice, ginger ale, broth or 7-Up.  Rest may also help.  Be sure to drink enough fluids.  Move to a regular diet as you feel able. Take pain medications with a small amount of solid food, such as toast or crackers, to avoid nausea.   A slight fever is normal. Call the doctor if your fever is over 100 F (37.7 C) (taken under the tongue) or lasts longer than 24 hours.  You may have a dry mouth, muscle aches, trouble sleeping or  a sore throat.  These symptoms should go away after 24 hours.  Do not make important or legal decisions.   Pain Management:      1. Take pain medication (if prescribed) for pain as directed by your physician.        2. WARNING: If the pain medication you have been prescribed contains Tylenol  (acetaminophen), DO NOT take additional doses of Tylenol (acetaminophen).     Call your doctor for any of the followin.  Signs of infection (fever, growing tenderness at the surgery site, severe pain, a large amount of drainage or bleeding, foul-smelling drainage, redness, swelling).    2.  It has been over 8 to 10 hours since surgery and you are still not able to urinate (pee).    3.  Headache for over 24 hours.    4.  Numbness, tingling or weakness the day after surgery (if you had spinal anesthesia).  To contact a doctor, call _______Darrian Lundberg's office at [phone] at the Plastic Reconstructive Surgery Clinic from 8 am till 5 pm __________ or:  '   [phone] and ask for the Resident On Call for:            _______________Plastic Reconstructive Surgery__________ (answered 24 hours a day)  '   Emergency Department:  [place] [facility]: [phone]  [place] [facility]: [phone]               Rev. 10/2014

## 2023-08-18 NOTE — BRIEF OP NOTE
[facility]    Brief Operative Note    Pre-operative diagnosis: Gender dysphoria in adult [F64.0]  Post-operative diagnosis Same as pre-operative diagnosis    Procedure: Procedure(s):  REVISION, SCAR, BREAST - s/p bilateral transgender mastectomy (incisions, dogears, nipple revision)  Surgeon: Surgeon(s) and Role:     * Anita Grant MD - Primary     * Jaylon Barriga MD - Resident - Assisting  Anesthesia: General   Estimated Blood Loss: Less than 100 ml    Drains: None  Specimens: * No specimens in log *  Findings:   None.  Complications: None.  Implants: * No implants in log *    Jaylon Barriga   Plastic & Reconstructive Surgery

## 2023-08-21 ENCOUNTER — PATIENT OUTREACH (OUTPATIENT)
Dept: PLASTIC SURGERY | Facility: CLINIC | Age: 28
End: 2023-08-21
Payer: COMMERCIAL

## 2023-08-21 NOTE — PATIENT INSTRUCTIONS
Pt called today with an update that his hysterectomy incisions still have pus drainage from suture hypersensitivity and that he has a rash from wearing the ace wrap. Pt denies fevers, chills or pain and is most interested in dressings for his hysterectomy incision sites. I updated pt that he will need to contact his surgeon that performed the hysterectomy to tell them about the incision sites and that I could not advise pt on this issue. I did advise pt that he could remove the ace wrap today, wash his skin gently with soap and water on a washcloth, dry area and then put on a clean abram shirt and re-wrap with the ace wrap. Pt was upset, saying that he has tried to communicate with his surgeon that performed the hysterectomy but that they wouldn't listen to him and he now has a sensitivity to Benadryl. I encouraged him to continue to communicate with their team as they need to be kept up to date on his condition. Pt has an appt with us for top surgery revision on 8/25.  Donald MCGEE RN

## 2023-08-25 ENCOUNTER — OFFICE VISIT (OUTPATIENT)
Dept: PLASTIC SURGERY | Facility: CLINIC | Age: 28
End: 2023-08-25
Payer: COMMERCIAL

## 2023-08-25 VITALS
SYSTOLIC BLOOD PRESSURE: 123 MMHG | HEART RATE: 62 BPM | DIASTOLIC BLOOD PRESSURE: 80 MMHG | OXYGEN SATURATION: 98 % | TEMPERATURE: 98 F

## 2023-08-25 DIAGNOSIS — Z90.13 S/P BILATERAL MASTECTOMY: Primary | ICD-10-CM

## 2023-08-25 PROCEDURE — 99024 POSTOP FOLLOW-UP VISIT: CPT | Performed by: NURSE PRACTITIONER

## 2023-08-25 RX ORDER — HYDROCODONE BITARTRATE AND ACETAMINOPHEN 5; 325 MG/1; MG/1
1 TABLET ORAL EVERY 6 HOURS PRN
COMMUNITY
End: 2024-08-14

## 2023-08-25 ASSESSMENT — PAIN SCALES - GENERAL: PAINLEVEL: MILD PAIN (3)

## 2023-08-25 NOTE — LETTER
8/25/2023       RE: Gregg Maharaj  [facility]  [address]  [place] [address]       Dear Colleague,    Thank you for referring your patient, Gregg Maharaj, to the [facility] PLASTIC AND RECONSTRUCTIVE SURGERY CLINIC [place] at [facility]. Please see a copy of my visit note below.    Plastic Surgery Outpatient Visit    ID: Gregg Maharaj is a 28 year old transgender male s/p scar revision with revision of right nipple graft for gender dysphoria 8/18/2023 with Dr. Grant. 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 He underwent revision of scars, removal of dog ears, and revision of right nipple graft on 8/18/23.    S: Gregg is here today for post-op visit after revision surgery on 8/18/23. Healing from top revision is going well and he is here for removal of sutures used for his nipple graft.   He has a lot of ongoing concerns about his laparoscopic incisions from his hysterectomy which was done through [facility] on 7/26/23. He has contacted their office and attended post-op visit through them.  He states he has had very negative experiences with that surgeon and their office. He reports being yelled at and ignored despite repeated concerns about a severe allergic reaction he experienced to the Dermabond and absorbable sutures they used for the hysterectomy. He states they told him to take benadryl, but did not warn him about toxicity. He is now using zyrtec in addition to his usual allergy medications to manage the hives and itching he experienced as part of the allergic reaction. He reports a suture spit out of one of the incisions with a hair on it that he fears was not his. He has also had pustular drainage from some of his laparoscopic incisions, but he was not given any antibiotics or intervention. The left  "most incision had a pocket of pus he was draining, but now reports only blood was coming out if he squeezed the area. Things have improved overall. He states he \"no longer feel safe\" with this surgeon or their care team, and is hoping we can assess his incisions or refer to wound care for ongoing care of those incisions.     O:  /80 (BP Location: Left arm, Patient Position: Sitting, Cuff Size: Adult Large)   Pulse 62   Temp 98  F (36.7  C) (Oral)   SpO2 98%    General: NAD  Chest: chest incisions c/d/I with minimal light bruising and minimal swelling.   Right nipple graft well adhered with sutures intact.    Abdomen: Four laparoscopic incisions in varied stages of healing. Three incisions well healed with some residual scab in place on the middle incision. Far left incision partially  with new fibrinous and granulation tissue filling in wound. Mild erythema of tissue surrounding incision, but tissue is soft and not hot or fluctuant. No signs of active infection.        A/P:  -healing well  - central sutures removed from nipple but sutures along bottom edge of nipple left inn place per Dr Grant preference.   -wrap x1 week  -Trex/5lb lifting restrictions x2 more weeks. Ok to increase movement and lifting up to 10lbs after this until 6 weeks post op, when restrictions will likely be cleared.  -RTC next week for removal of additional sutures.    45 minutes spent on the date of the encounter doing chart review, history and physical, dressing changes, documentation and further activity as noted above.           Again, thank you for allowing me to participate in the care of your patient.      Sincerely,    CA Patel CNP    "

## 2023-08-25 NOTE — NURSING NOTE
Chief Complaint   Patient presents with    Surgical Followup     Gregg is being seen today for a 1 week post-op, DOS 8/18/23.       Vitals:    08/25/23 1412   BP: 123/80   BP Location: Left arm   Patient Position: Sitting   Cuff Size: Adult Large   Pulse: 62   Temp: 98  F (36.7  C)   TempSrc: Oral   SpO2: 98%       There is no height or weight on file to calculate BMI. Per patient.    Chin Justice, EMT

## 2023-08-25 NOTE — PROGRESS NOTES
"Plastic Surgery Outpatient Visit    ID: Gregg Maharaj is a 28 year old transgender male s/p scar revision with revision of right nipple graft for gender dysphoria 8/18/2023 with Dr. Grant. 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 He underwent revision of scars, removal of dog ears, and revision of right nipple graft on 8/18/23.    S: Gregg is here today for post-op visit after revision surgery on 8/18/23. Healing from top revision is going well and he is here for removal of sutures used for his nipple graft.   He has a lot of ongoing concerns about his laparoscopic incisions from his hysterectomy which was done through [facility] on 7/26/23. He has contacted their office and attended post-op visit through them.  He states he has had very negative experiences with that surgeon and their office. He reports being yelled at and ignored despite repeated concerns about a severe allergic reaction he experienced to the Dermabond and absorbable sutures they used for the hysterectomy. He states they told him to take benadryl, but did not warn him about toxicity. He is now using zyrtec in addition to his usual allergy medications to manage the hives and itching he experienced as part of the allergic reaction. He reports a suture spit out of one of the incisions with a hair on it that he fears was not his. He has also had pustular drainage from some of his laparoscopic incisions, but he was not given any antibiotics or intervention. The left most incision had a pocket of pus he was draining, but now reports only blood was coming out if he squeezed the area. Things have improved overall. He states he \"no longer feel safe\" with this surgeon or their care team, and is hoping we can assess his incisions or refer to wound care for ongoing care of those incisions.     O:  /80 (BP " Location: Left arm, Patient Position: Sitting, Cuff Size: Adult Large)   Pulse 62   Temp 98  F (36.7  C) (Oral)   SpO2 98%    General: NAD  Chest: chest incisions c/d/I with minimal light bruising and minimal swelling.   Right nipple graft well adhered with sutures intact.    Abdomen: Four laparoscopic incisions in varied stages of healing. Three incisions well healed with some residual scab in place on the middle incision. Far left incision partially  with new fibrinous and granulation tissue filling in wound. Mild erythema of tissue surrounding incision, but tissue is soft and not hot or fluctuant. No signs of active infection.        A/P:  -healing well  - central sutures removed from nipple but sutures along bottom edge of nipple left inn place per Dr Grant preference.   -wrap x1 week  -Trex/5lb lifting restrictions x2 more weeks. Ok to increase movement and lifting up to 10lbs after this until 6 weeks post op, when restrictions will likely be cleared.  -RTC next week for removal of additional sutures.    Gely Barboza APRN, CNP  [facility]    45 minutes spent on the date of the encounter doing chart review, history and physical, dressing changes, documentation and further activity as noted above.

## 2023-08-28 NOTE — OP NOTE
OPERATIVE NOTE    DATE OF PROCEDURE: August 18, 2023  ATTENDING SURGEON: Darrian Grant MD  RESIDENT SURGEON:Jaylon Barriga MD PGY3  ASSISTANT(S): Rony Rodriges, zoila MS Sub-I  PREOP DIAGNOSIS: Persistent gender dysphoria status post top surgery  POSTOP DIAGNOSIS: Same  PROCEDURE: Revision top surgery, including scars (80 cm), dogear deformities, and right nipple graft revision.  ANESTHESIA: General  EBL: 50 cc  IVFS: 1000 cc  UO: Not measured  COUNTS: Correct  COMPLICATIONS: None  DRAINS: None  SPECIMENS: None    INDICATIONS:  This is a 28 year old transgender individual with a diagnosis of persistent gender dysphoria status post top surgery.  The patient was visibly distressed by residual deformities involving bilateral incisions, midline and posterior dogears, and right nipple graft asymmetry.  Our plan was to most likely excised the entirety of the inframammary fold incisions.  This would correct not only the bilateral and midline dogear deformities, but also gained better symmetry with regard to the somewhat ptotic redundant tissue at the anterolateral aspects of the superior skin flaps.  We also planned to remove some of the right areolar graft to gain better symmetry.  The central nipple would also be reduced and hopefully remove the portion producing drainage when the patient squeezed their nipple graft.    PROCEDURE:  The patient was seen in the preop waiting area. The operative site was marked either on the patient or on the anatomical diagram.  In this case, we marked the sternal notch, sternal midline, previous incisions along the IMF including the areas to be resected, midline and posterior dogear deformities, and portions of the right nipple graft to be excised in order to better match the left graft.  Informed consent was obtained after reviewing the possible risks and complications, including but not limited to the following: infection, bleeding, hematoma/seroma formation,  injury  to surrounding musculoskeletal and neurovascular structures, including intrathoracic and intra axillary structures, partial or complete nipple graft failure, altered sensation of the chest wall or surgical sites including hypo or hypersensitivity,  residual deformities or asymmetries, need for further surgery, persistent gender dysphoria, incisional dehiscence, spitting sutures, hypertrophic or keloid scarring, anesthetic risks such as DVT, PE, cardiopulmonary arrest.     The patient was then brought to the operating room. Anesthesia was administered and the patient was oral endotracheally intubated. The patient was  on the table in a supine position with pillows under the thighs and forelegs, secured by padded safety straps.  Arms were secured to arm boards with Ace wraps and oriented at 90 degrees from the table. The surgical site was exposed, prepped and draped in the usual sterile fashion using ChloraPrep. Preop photos were taken of our markings. A time out was taken and the proper patient and procedure were identified.    We reincised the IMF scars including the areas for additional skin resection along the preop markings.  #10 scalpel and [place] lab cautery was used on the left, and PEAK PlasmaBlade was used on the right.  We did not have to undermine the skin flaps too much other than to gain a better contour and symmetry.  We did join our medial incisions at the midline to eliminate small dogear deformities and correct slight differences in skin flap thickness.  The posterior small dogears were also excised.  After our initial resection, the incisions were temporarily skin stapled and the patient put into a sitting position.  This allowed us to make additional changes in the level and shape of our new incisions.  It also allowed us to davon what portion of the right nipple graft would need to be excised for better symmetry.  A crescent of tissue from the right central nipple graft was also excised.    Closure  was achieved with 2-0 and 3-0 Vicryl deep and deep dermal buried sutures.  This patient had not had a significant reaction to our previous sutures, unlike the reactions they had to their recent hysterectomy sutures.  We chose not to use drains or On-Q catheters since the space of dissection was not very large.  Skin was reapproximated using 4-0 Vicryl running subcuticular sutures.  Since this patient also has sensitivities to adhesives, we chose to forego our usual Dermabond dressing on the incisions.  Of note, rather than using our usual 5-0 fast-absorbing gut sutures for the nipple revision, we used 4-0 Prolene to reapproximate the newly reduced graft, areola and central nipple to avoid any additional inflammatory reactions.  Or usual nipple graft bolster dressings were also not necessary.    Once we are happy with our closures, we dressed the site with Kerlix rolls before wrapping circumferentially with double long 6 inch Ace.  The patient was then extubated, transferred to stretcher, and taken to the recovery room in satisfactory condition having tolerated the procedure without difficulty or complication.  Note: The length of the entire incision that was revised was 80 cm long.  No path specimens were submitted since this excised tissue was predominantly skin.

## 2023-08-29 ENCOUNTER — OFFICE VISIT (OUTPATIENT)
Dept: PLASTIC SURGERY | Facility: CLINIC | Age: 28
End: 2023-08-29
Payer: COMMERCIAL

## 2023-08-29 VITALS
DIASTOLIC BLOOD PRESSURE: 69 MMHG | HEART RATE: 53 BPM | BODY MASS INDEX: 34.41 KG/M2 | HEIGHT: 65 IN | SYSTOLIC BLOOD PRESSURE: 107 MMHG | OXYGEN SATURATION: 99 %

## 2023-08-29 DIAGNOSIS — Z98.890 POSTOPERATIVE STATE: Primary | ICD-10-CM

## 2023-08-29 PROCEDURE — 99024 POSTOP FOLLOW-UP VISIT: CPT | Performed by: SURGERY

## 2023-08-29 ASSESSMENT — PAIN SCALES - GENERAL: PAINLEVEL: MILD PAIN (2)

## 2023-08-29 NOTE — PROGRESS NOTES
"PLASTICS POST-OP  This is a 28 year old trans male with a history of gender dysphoria who presents 2 weeks post-op after a top surgery revision with nipple graft reconstruction on 8/16/2023. He is here today with his partner.    Gregg states that he had some spitting sutures from their abdominal port sites. They are interested in having the remaining suture material removed from their R nipple revision during today's visit.     PE: Chest: Nice upper chest contour. Redundant tissue resected with good results.  Good symmetry.   Dog ear in the right axillary is \"as small as possible.\"  Scars are nice and thin and flat at present. Join in midline with subtle curve.   Nipples  - R areola reduced, as well as central nipple. Appears to be healing well with only mold drainage.   Photos taken with verbal consent.     A&P: 28 year old trans male who presents 2 weeks post-op after a top surgery revision with nipple graft conservation on 8/16/2023.     Overall Gregg is healing quite well.     Given that they are only 2 weeks out of surgery they are advised to wait al least 1 more week before they begin doing scar care with massage. Moisturizing is ok.     Gregg is happy with his results. \"Everything looks a lot less inflamed,\" he states. He is advised that he can wear acewraps over shirts if he wishes, given his history of contact dermatitis from irritants (different from allergies).     He will follow up 1 month post-op. He was given an extra double long 6\" ACE wrap, and a handful of Mepilex border dressings to protect R NG.Causes for returning sooner were discussed.     Total time = 10 minutes, spent on the date of encounter doing chart review, history and physical, dressing changes, documentation, patient education, and any further activity as noted above.     This note was prepared on behalf of Anita Grant MD by Jennifer Dominguez (they/them), a trained medical scribe, based on my observations and the provider's " statements to me.

## 2023-08-29 NOTE — LETTER
"8/29/2023       RE: Gregg Maharaj  [facility]  [address]  [place] [address]     Dear Colleague,    Thank you for referring your patient, Gregg Maharaj, to the [facility] PLASTIC AND RECONSTRUCTIVE SURGERY CLINIC [place] at [facility]. Please see a copy of my visit note below.    PLASTICS POST-OP  This is a 28 year old trans male with a history of gender dysphoria who presents 2 weeks post-op after a top surgery revision with nipple graft reconstruction on 8/16/2023. He is here today with his partner.    Gregg states that he had some spitting sutures from their abdominal port sites. They are interested in having the remaining suture material removed from their R nipple revision during today's visit.     PE: Chest: Nice upper chest contour. Redundant tissue resected with good results.  Good symmetry.   Dog ear in the right axillary is \"as small as possible.\"  Scars are nice and thin and flat at present. Join in midline with subtle curve.   Nipples  - R areola reduced, as well as central nipple. Appears to be healing well with only mold drainage.   Photos taken with verbal consent.     A&P: 28 year old trans male who presents 2 weeks post-op after a top surgery revision with nipple graft conservation on 8/16/2023.     Overall Gregg is healing quite well.     Given that they are only 2 weeks out of surgery they are advised to wait al least 1 more week before they begin doing scar care with massage. Moisturizing is ok.     Gregg is happy with his results. \"Everything looks a lot less inflamed,\" he states. He is advised that he can wear acewraps over shirts if he wishes, given his history of contact dermatitis from irritants (different from allergies).     He will follow up 1 month post-op. He was given an extra double long 6\" ACE wrap, and a handful of Mepilex border dressings to protect R NG.Causes for returning sooner were discussed.     Total time " = 10 minutes, spent on the date of encounter doing chart review, history and physical, dressing changes, documentation, patient education, and any further activity as noted above.     This note was prepared on behalf of Anita Grant MD by Jennifer Dominguez (they/them), a trained medical scribe, based on my observations and the provider's statements to me.         Again, thank you for allowing me to participate in the care of your patient.      Sincerely,    Ainta Grant MD

## 2023-08-29 NOTE — NURSING NOTE
"Chief Complaint   Patient presents with    RECHECK     Gregg, is being seen today for a post-op suture removal, DOS 8/18/23       Vitals:    08/29/23 1229   BP: 107/69   BP Location: Left arm   Patient Position: Chair   Cuff Size: Adult Large   Pulse: 53   SpO2: 99%   Height: 1.651 m (5' 5\")       Body mass index is 34.41 kg/m .      Cassandra Zamora LPN    "

## 2023-09-05 ENCOUNTER — OFFICE VISIT (OUTPATIENT)
Dept: PLASTIC SURGERY | Facility: CLINIC | Age: 28
End: 2023-09-05
Payer: COMMERCIAL

## 2023-09-05 VITALS — SYSTOLIC BLOOD PRESSURE: 115 MMHG | OXYGEN SATURATION: 100 % | DIASTOLIC BLOOD PRESSURE: 71 MMHG | HEART RATE: 65 BPM

## 2023-09-05 DIAGNOSIS — F64.0 GENDER DYSPHORIA IN ADULT: Primary | ICD-10-CM

## 2023-09-05 PROCEDURE — 99024 POSTOP FOLLOW-UP VISIT: CPT | Performed by: PHYSICIAN ASSISTANT

## 2023-09-05 RX ORDER — DOXYCYCLINE 100 MG/1
100 CAPSULE ORAL 2 TIMES DAILY
Qty: 14 CAPSULE | Refills: 0 | Status: SHIPPED | OUTPATIENT
Start: 2023-09-05 | End: 2023-09-12

## 2023-09-05 RX ORDER — FLUCONAZOLE 150 MG/1
150 TABLET ORAL ONCE
Qty: 1 TABLET | Refills: 1 | Status: SHIPPED | OUTPATIENT
Start: 2023-09-05 | End: 2023-09-05

## 2023-09-05 ASSESSMENT — PAIN SCALES - GENERAL: PAINLEVEL: MODERATE PAIN (4)

## 2023-09-05 NOTE — NURSING NOTE
Chief Complaint   Patient presents with    Surgical Followup     Gregg is being seen today for a post-op, DOS 8/18/23.       Vitals:    09/05/23 1135   BP: 115/71   BP Location: Right arm   Patient Position: Sitting   Cuff Size: Adult Large   Pulse: 65   SpO2: 100%       There is no height or weight on file to calculate BMI.    Chin Justice, EMT

## 2023-09-05 NOTE — PROGRESS NOTES
Plastic Surgery Outpatient Visit    ID: Gregg Maharaj is a 28 year old transgender male s/p revision top surgery 8/18 with Dr. Grant.     S: complains of pain to L lateral incision area. Complains of drainage with pus when they squeeze it. Pain, as well as the hot temperatures, are keeping them up at night and they aren't sleeping, which is making things worse. Tearful.     O:  /71 (BP Location: Right arm, Patient Position: Sitting, Cuff Size: Adult Large)   Pulse 65   SpO2 100%    General: NAD  Chest: L lateral incision with mild erythema and swelling. Small scab and pinpoint draining area. Remaining bilateral chest incisions c/d/I. Nipple grafts healing well. R graft with pinpoint draining area centrally.     A/P:  -suture irritation/early granuloma with possible cellulitis.  -will start doxy and send diflucan per patient request  -mepilex for L lateral incision and R nipple. Can use aquafor or baci but no other wound care needed at this time as areas are essentially closed with small openings that are draining. Will continue to monitor and update any wound care as needed  -RTC 1 week    Grecia Rodriguez PA-C  Plastic and Reconstructive Surgery    20 minutes spent on the date of the encounter doing chart review, history and physical, dressing changes, documentation and further activity as noted above.

## 2023-09-05 NOTE — LETTER
9/5/2023       RE: Gregg Maharaj  [address]  [address]  [place] [address]       Dear Colleague,    Thank you for referring your patient, Gregg Maharaj, to the [facility] PLASTIC AND RECONSTRUCTIVE SURGERY CLINIC [place] at [facility]. Please see a copy of my visit note below.    Plastic Surgery Outpatient Visit    ID: Gregg Maharaj is a 28 year old transgender male s/p revision top surgery 8/18 with Dr. Grant.     S: complains of pain to L lateral incision area. Complains of drainage with pus when they squeeze it. Pain, as well as the hot temperatures, are keeping them up at night and they aren't sleeping, which is making things worse. Tearful.     O:  /71 (BP Location: Right arm, Patient Position: Sitting, Cuff Size: Adult Large)   Pulse 65   SpO2 100%    General: NAD  Chest: L lateral incision with mild erythema and swelling. Small scab and pinpoint draining area. Remaining bilateral chest incisions c/d/I. Nipple grafts healing well. R graft with pinpoint draining area centrally.     A/P:  -suture irritation/early granuloma with possible cellulitis.  -will start doxy and send diflucan per patient request  -mepilex for L lateral incision and R nipple. Can use aquafor or baci but no other wound care needed at this time as areas are essentially closed with small openings that are draining. Will continue to monitor and update any wound care as needed  -RTC 1 week      20 minutes spent on the date of the encounter doing chart review, history and physical, dressing changes, documentation and further activity as noted above.      Again, thank you for allowing me to participate in the care of your patient.      Sincerely,    Grecia Rodriguez PA-C

## 2023-09-13 DIAGNOSIS — L73.9 FOLLICULITIS: ICD-10-CM

## 2023-09-18 RX ORDER — KETOCONAZOLE 20 MG/ML
SHAMPOO TOPICAL
Qty: 120 ML | Refills: 8 | Status: SHIPPED | OUTPATIENT
Start: 2023-09-18

## 2023-09-19 ENCOUNTER — TELEPHONE (OUTPATIENT)
Dept: DERMATOLOGY | Facility: CLINIC | Age: 28
End: 2023-09-19
Payer: COMMERCIAL

## 2023-09-19 NOTE — TELEPHONE ENCOUNTER
Left Voicemail (1st Attempt) for the patient to call back and schedule the following:    Appointment type: Return  Provider: Monserrat Celeste PA-C  Return date: 10/04/23  Specialty phone number: [phone]

## 2023-09-21 ENCOUNTER — TELEPHONE (OUTPATIENT)
Dept: DERMATOLOGY | Facility: CLINIC | Age: 28
End: 2023-09-21
Payer: COMMERCIAL

## 2023-09-21 NOTE — TELEPHONE ENCOUNTER
Left Voicemail (1st Attempt) informing pt that the following has been cancelled:    Appointment type: Return  Provider: Monserrat Celeste  Return date: 10/4/23  Specialty phone number: [phone]

## 2023-09-22 ENCOUNTER — TELEPHONE (OUTPATIENT)
Dept: DERMATOLOGY | Facility: CLINIC | Age: 28
End: 2023-09-22
Payer: COMMERCIAL

## 2023-09-22 NOTE — TELEPHONE ENCOUNTER
Left Voicemail (2nd Attempt) for the patient to call back and schedule the following:    Appointment type: Return  Provider: Monserrat Celeste  Return date: 10/4/23  Specialty phone number: [phone]

## 2023-10-07 ENCOUNTER — HEALTH MAINTENANCE LETTER (OUTPATIENT)
Age: 28
End: 2023-10-07

## 2023-10-27 ENCOUNTER — HOSPITAL ENCOUNTER (EMERGENCY)
Facility: CLINIC | Age: 28
Discharge: HOME | End: 2023-10-27
Attending: EMERGENCY MEDICINE | Admitting: EMERGENCY MEDICINE
Payer: COMMERCIAL

## 2023-10-27 ENCOUNTER — APPOINTMENT (OUTPATIENT)
Dept: CT IMAGING | Facility: CLINIC | Age: 28
End: 2023-10-27
Attending: EMERGENCY MEDICINE
Payer: COMMERCIAL

## 2023-10-27 VITALS
WEIGHT: 206 LBS | SYSTOLIC BLOOD PRESSURE: 110 MMHG | TEMPERATURE: 97.8 F | OXYGEN SATURATION: 100 % | DIASTOLIC BLOOD PRESSURE: 78 MMHG | HEART RATE: 93 BPM | BODY MASS INDEX: 34.28 KG/M2 | RESPIRATION RATE: 17 BRPM

## 2023-10-27 DIAGNOSIS — R51.9 FACIAL PAIN: ICD-10-CM

## 2023-10-27 DIAGNOSIS — R51.9 ACUTE NONINTRACTABLE HEADACHE, UNSPECIFIED HEADACHE TYPE: ICD-10-CM

## 2023-10-27 LAB
ALBUMIN SERPL BCG-MCNC: 4.5 G/DL (ref 3.5–5.2)
ALP SERPL-CCNC: 118 U/L (ref 35–129)
ALT SERPL W P-5'-P-CCNC: 24 U/L (ref 0–70)
ANION GAP SERPL CALCULATED.3IONS-SCNC: 12 MMOL/L (ref 7–15)
AST SERPL W P-5'-P-CCNC: 29 U/L (ref 0–45)
BASOPHILS # BLD AUTO: 0 10E3/UL (ref 0–0.2)
BASOPHILS NFR BLD AUTO: 0 %
BILIRUB SERPL-MCNC: 0.4 MG/DL
BUN SERPL-MCNC: 7.9 MG/DL (ref 6–20)
CALCIUM SERPL-MCNC: 9.8 MG/DL (ref 8.6–10)
CHLORIDE SERPL-SCNC: 103 MMOL/L (ref 98–107)
CREAT SERPL-MCNC: 0.9 MG/DL (ref 0.51–1.17)
DEPRECATED HCO3 PLAS-SCNC: 28 MMOL/L (ref 22–29)
EGFRCR SERPLBLD CKD-EPI 2021: >90 ML/MIN/1.73M2
EOSINOPHIL # BLD AUTO: 0.1 10E3/UL (ref 0–0.7)
EOSINOPHIL NFR BLD AUTO: 2 %
ERYTHROCYTE [DISTWIDTH] IN BLOOD BY AUTOMATED COUNT: 13.2 % (ref 10–15)
GLUCOSE SERPL-MCNC: 89 MG/DL (ref 70–99)
HCG SERPL QL: NEGATIVE
HCT VFR BLD AUTO: 50 % (ref 35–53)
HGB BLD-MCNC: 16.4 G/DL (ref 11.7–17.7)
IMM GRANULOCYTES # BLD: 0 10E3/UL
IMM GRANULOCYTES NFR BLD: 0 %
LYMPHOCYTES # BLD AUTO: 2.2 10E3/UL (ref 0.8–5.3)
LYMPHOCYTES NFR BLD AUTO: 32 %
MAGNESIUM SERPL-MCNC: 2.4 MG/DL (ref 1.7–2.3)
MCH RBC QN AUTO: 27.9 PG (ref 26.5–33)
MCHC RBC AUTO-ENTMCNC: 32.8 G/DL (ref 31.5–36.5)
MCV RBC AUTO: 85 FL (ref 78–100)
MONOCYTES # BLD AUTO: 0.6 10E3/UL (ref 0–1.3)
MONOCYTES NFR BLD AUTO: 9 %
NEUTROPHILS # BLD AUTO: 3.9 10E3/UL (ref 1.6–8.3)
NEUTROPHILS NFR BLD AUTO: 57 %
NRBC # BLD AUTO: 0 10E3/UL
NRBC BLD AUTO-RTO: 0 /100
PHOSPHATE SERPL-MCNC: 3.7 MG/DL (ref 2.5–4.5)
PLATELET # BLD AUTO: 252 10E3/UL (ref 150–450)
POTASSIUM SERPL-SCNC: 4.4 MMOL/L (ref 3.4–5.3)
PROT SERPL-MCNC: 7.5 G/DL (ref 6.4–8.3)
RBC # BLD AUTO: 5.87 10E6/UL (ref 3.8–5.9)
SODIUM SERPL-SCNC: 143 MMOL/L (ref 135–145)
WBC # BLD AUTO: 6.9 10E3/UL (ref 4–11)

## 2023-10-27 PROCEDURE — 99285 EMERGENCY DEPT VISIT HI MDM: CPT | Mod: 25 | Performed by: EMERGENCY MEDICINE

## 2023-10-27 PROCEDURE — 80053 COMPREHEN METABOLIC PANEL: CPT | Performed by: EMERGENCY MEDICINE

## 2023-10-27 PROCEDURE — 70450 CT HEAD/BRAIN W/O DYE: CPT

## 2023-10-27 PROCEDURE — 85025 COMPLETE CBC W/AUTO DIFF WBC: CPT | Performed by: EMERGENCY MEDICINE

## 2023-10-27 PROCEDURE — 99284 EMERGENCY DEPT VISIT MOD MDM: CPT | Performed by: EMERGENCY MEDICINE

## 2023-10-27 PROCEDURE — 84100 ASSAY OF PHOSPHORUS: CPT | Performed by: EMERGENCY MEDICINE

## 2023-10-27 PROCEDURE — 70450 CT HEAD/BRAIN W/O DYE: CPT | Mod: 26 | Performed by: RADIOLOGY

## 2023-10-27 PROCEDURE — 83735 ASSAY OF MAGNESIUM: CPT | Performed by: EMERGENCY MEDICINE

## 2023-10-27 PROCEDURE — 96361 HYDRATE IV INFUSION ADD-ON: CPT | Performed by: EMERGENCY MEDICINE

## 2023-10-27 PROCEDURE — 96374 THER/PROPH/DIAG INJ IV PUSH: CPT | Performed by: EMERGENCY MEDICINE

## 2023-10-27 PROCEDURE — 84703 CHORIONIC GONADOTROPIN ASSAY: CPT | Performed by: EMERGENCY MEDICINE

## 2023-10-27 PROCEDURE — 258N000003 HC RX IP 258 OP 636: Performed by: EMERGENCY MEDICINE

## 2023-10-27 PROCEDURE — 250N000011 HC RX IP 250 OP 636: Mod: JZ | Performed by: EMERGENCY MEDICINE

## 2023-10-27 PROCEDURE — 36415 COLL VENOUS BLD VENIPUNCTURE: CPT | Performed by: EMERGENCY MEDICINE

## 2023-10-27 RX ORDER — KETOROLAC TROMETHAMINE 15 MG/ML
15 INJECTION, SOLUTION INTRAMUSCULAR; INTRAVENOUS ONCE
Status: COMPLETED | OUTPATIENT
Start: 2023-10-27 | End: 2023-10-27

## 2023-10-27 RX ADMIN — SODIUM CHLORIDE 1000 ML: 9 INJECTION, SOLUTION INTRAVENOUS at 18:27

## 2023-10-27 RX ADMIN — KETOROLAC TROMETHAMINE 15 MG: 15 INJECTION, SOLUTION INTRAMUSCULAR; INTRAVENOUS at 18:28

## 2023-10-27 ASSESSMENT — ACTIVITIES OF DAILY LIVING (ADL): ADLS_ACUITY_SCORE: 35

## 2023-10-27 NOTE — ED TRIAGE NOTES
28 yr old ambulatory to triage presenting with concern of TIA yesterday. Also concerned about cyst in sinus on right side. Currently has headache. Alert and oriented in triage, clear speech, no gross neuro deficits.      Triage Assessment (Adult)       Row Name 10/27/23 7679          Triage Assessment    Airway WDL WDL        Respiratory WDL    Respiratory WDL WDL        Skin Circulation/Temperature WDL    Skin Circulation/Temperature WDL WDL        Cardiac WDL    Cardiac WDL WDL        Peripheral/Neurovascular WDL    Peripheral Neurovascular WDL WDL        Cognitive/Neuro/Behavioral WDL    Cognitive/Neuro/Behavioral WDL WDL

## 2023-10-27 NOTE — ED PROVIDER NOTES
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 Patient reports that yesterday he awoke from a nightmare with right sided parasthesias. Patient also reports that his vision seemed abnormal and darker than usual on the right side. The parasthesias and vision seemed to be improving.    The patient reports that today he had a headache. He has migraines and states this seemed a little different than usual. He has been getting more headaches recently and does have a neurologist he sees. He called a nurse triage line and was told to go to the ED.    Patient also states he had a cyst in his right sinus and he thinks this ruptured he had some nasal drainage from the right nare.      Past Medical History  Past Medical History:   Diagnosis Date    ADHD (attention deficit hyperactivity disorder)     VERNON positive     Anxiety     Asthma     Borderline personality disorder (H)     Concussion     Depression     Fibromyalgia     Gastroesophageal reflux disease with esophagitis     Gender dysphoria in adolescent and adult     Hypermobility of joint     Hypersomnia     Migraine     Mucous retention cyst of maxillary sinus     Obesity     PTSD (post-traumatic stress disorder)     Seasonal allergic rhinitis     Urinary frequency      Past Surgical History:   Procedure Laterality Date    Direct microlaryngoscopy with biopsy  06/29/2022    REVISE SCAR BREAST Bilateral 8/18/2023    Procedure: REVISION, SCAR, BREAST - s/p bilateral transgender mastectomy (incisions, dogears, nipple revision);  Surgeon: Anita Grant MD;  Location: [facility] OR    TRANSGENDER MASTECTOMY Bilateral 9/26/2022    Procedure: MASTECTOMY, BILATERAL, SIMPLE, nipple  grafts. OnQ;  Surgeon: Anita Grant MD;  Location: [facility] OR     ACCU-CHEK GUIDE test strip  albuterol (PROAIR HFA/PROVENTIL HFA/VENTOLIN HFA) 108 (90 Base) MCG/ACT inhaler  azelastine (ASTELIN) 0.1 % nasal spray  baclofen (LIORESAL) 10 MG tablet  buPROPion (WELLBUTRIN XL) 150 MG 24 hr tablet  calcipotriene (DOVONOX) 0.005 % external solution  cetirizine (ZYRTEC) 10 MG tablet  clindamycin-benzoyl peroxide (BENZACLIN) 1-5 % external gel  clotrimazole (LOTRIMIN) 1 % external cream  CONCERTA 54 MG CR tablet  Continuous Blood Gluc Sensor (FREESTYLE JOSSY 2 SENSOR) MISC  diazepam (VALIUM) 5 MG tablet  diphenhydrAMINE (BENADRYL) 25 MG capsule  EPINEPHrine (ANY BX GENERIC EQUIV) 0.3 MG/0.3ML injection 2-pack  estradiol cypionate (DEPO-ESTRADIOL) 5 MG/ML injection  famotidine (PEPCID) 20 MG tablet  gabapentin (NEURONTIN) 300 MG capsule  HYDROcodone-acetaminophen (NORCO) 5-325 MG tablet  ibuprofen (ADVIL/MOTRIN) 200 MG capsule  ketoconazole (NIZORAL) 2 % external shampoo  loratadine (CLARITIN) 10 MG tablet  metFORMIN (GLUCOPHAGE) 500 MG tablet  Methylphenidate HCl (RITALIN PO)  minoxidil (LONITEN) 2.5 MG tablet  Minoxidil 5 % FOAM  montelukast (SINGULAIR) 10 MG tablet  ondansetron (ZOFRAN ODT) 4 MG ODT tab  OTHER MEDICAL SUPPLIES  oxybutynin ER (DITROPAN XL) 10 MG 24 hr tablet  oxybutynin ER (DITROPAN XL) 5 MG 24 hr tablet  propranolol ER (INDERAL LA) 160 MG 24 hr capsule  Specialty Vitamins Products (VITAMINS FOR HAIR) CAPS  SUMAtriptan (IMITREX) 50 MG tablet  tacrolimus (PROTOPIC) 0.1 % external ointment  testosterone (ANDROGEL 1.62 % PUMP) 20.25 MG/ACT gel  tretinoin (RETIN-A) 0.05 % external cream  ubrogepant (UBRELVY) 50 MG tablet  venlafaxine (EFFEXOR-ER) 150 MG TB24 24 hr tablet  vitamin D3 (CHOLECALCIFEROL) 10 MCG (400 UNIT) capsule      Allergies   Allergen Reactions    Capsaicin Anaphylaxis    Capsicum Annuum Extract & Derivative (Bell Pepper) [Capsicum] Anaphylaxis    Food Shortness Of Breath     Red chili  peppers (harissa paste)    No Clinical Screening - See Comments Anaphylaxis, Dermatitis, Hives, Itching, Rash and Shortness Of Breath     Cotton seed oil  Cotton seed oil    Adhesive Tape      Other reaction(s): Atopic Dermatitis ALSO DERMABOND - PT HAD HIVES  Other reaction(s): Atopic Dermatitis  Other reaction(s): Atopic Dermatitis  ALSO PT REACTED TO ABSORBABLE SUTURES - BODY REJECTION, PUS, SWELLING AND REDNESS.    Benadryl [Diphenhydramine] Hallucination     Auditory hallucination from benadryl toxicity    Kiwi     Other Environmental Allergy Hives     Cotton seed oil    Tomato     Metoclopramide Anxiety     Makes pt feel claustrophobic    Oxycodone Other (See Comments)     Dysphoria and insomnia     Family History  Family History   Problem Relation Age of Onset    Anesthesia Reaction No family hx of     Deep Vein Thrombosis (DVT) No family hx of      Social History   Social History     Tobacco Use    Smoking status: Never     Passive exposure: Never    Smokeless tobacco: Never   Vaping Use    Vaping Use: Never used         A medically appropriate review of systems was performed with pertinent positives and negatives noted in the HPI, and all other systems negative.    Physical Exam   BP: (!) 150/90  Pulse: 93  Temp: 97.6  F (36.4  C)  Resp: 18  Weight: 93.4 kg (206 lb)  SpO2: 97 %  Physical Exam  Constitutional:       General: He is not in acute distress.     Appearance: Normal appearance. He is not toxic-appearing.   HENT:      Head: Normocephalic and atraumatic.      Nose: Nose normal. No congestion or rhinorrhea.      Mouth/Throat:      Mouth: Mucous membranes are moist.      Pharynx: Oropharynx is clear. No oropharyngeal exudate or posterior oropharyngeal erythema.   Eyes:      General:         Right eye: No discharge.         Left eye: No discharge.      Extraocular Movements: Extraocular movements intact.      Conjunctiva/sclera: Conjunctivae normal.      Pupils: Pupils are equal, round, and reactive to  light.   Cardiovascular:      Rate and Rhythm: Normal rate and regular rhythm.   Pulmonary:      Effort: Pulmonary effort is normal. No respiratory distress.      Breath sounds: No wheezing.   Musculoskeletal:         General: Normal range of motion.      Cervical back: Normal range of motion. No rigidity.   Skin:     General: Skin is warm and dry.   Neurological:      General: No focal deficit present.      Mental Status: He is alert and oriented to person, place, and time.      Sensory: No sensory deficit.      Motor: No weakness.           ED Course, Procedures, & Data      Procedures                      Results for orders placed or performed during the hospital encounter of 10/27/23   CT Head w/o Contrast     Status: None    Narrative    CT HEAD W/O CONTRAST 10/27/2023 7:39 PM    Provided History: headache, acute    Comparison: None.    Technique: Using multidetector thin collimation helical acquisition  technique, axial, coronal and sagittal CT images from the skull base  to the vertex were obtained without intravenous contrast.     Findings:    No intracranial hemorrhage. No mass effect. No midline shift. No  extra-axial fluid collection. The gray to white matter differentiation  of the cerebral hemispheres is preserved. Ventricles are proportionate  to the sulci. No sulcal effacement. The basal cisterns are patent.    Osseous lesion within the right frontal sinus.The visualized paranasal  sinuses are otherwise clear. The mastoid air cells are clear. Orbits  appear unremarkable. No acute fracture.      Impression    Impression:  1. No acute intracranial pathology.  2. Osseous lesion within the right frontoethmoidal recess, possibly  representing a fibro-osseous lesion.    I have personally reviewed the examination and initial interpretation  and I agree with the findings.    ZOIE HAY MD         SYSTEM ID:  [id number]   HCG qualitative pregnancy (blood)     Status: Normal   Result Value Ref Range     "hCG Serum Qualitative Negative Negative   Magnesium     Status: Abnormal   Result Value Ref Range    Magnesium 2.4 (H) 1.7 - 2.3 mg/dL   Phosphorus     Status: Normal   Result Value Ref Range    Phosphorus 3.7 2.5 - 4.5 mg/dL   Comprehensive metabolic panel     Status: Normal   Result Value Ref Range    Sodium 143 135 - 145 mmol/L    Potassium 4.4 3.4 - 5.3 mmol/L    Carbon Dioxide (CO2) 28 22 - 29 mmol/L    Anion Gap 12 7 - 15 mmol/L    Urea Nitrogen 7.9 6.0 - 20.0 mg/dL    Creatinine 0.90 0.51 - 1.17 mg/dL    GFR Estimate >90 >60 mL/min/1.73m2    Calcium 9.8 8.6 - 10.0 mg/dL    Chloride 103 98 - 107 mmol/L    Glucose 89 70 - 99 mg/dL    Alkaline Phosphatase 118 35 - 129 U/L    AST 29 0 - 45 U/L    ALT 24 0 - 70 U/L    Protein Total 7.5 6.4 - 8.3 g/dL    Albumin 4.5 3.5 - 5.2 g/dL    Bilirubin Total 0.4 <=1.2 mg/dL    Narrative    The generation of reference intervals for this test is currently based on binary male or female sex. If the electronic health record information indicates another gender identity or if Legal Sex is recorded as \"Unknown\", both male and female reference intervals are provided where applicable, and should be considered according to the individual's appropriate clinical context.   CBC with platelets and differential     Status: None   Result Value Ref Range    WBC Count 6.9 4.0 - 11.0 10e3/uL    RBC Count 5.87 3.80 - 5.90 10e6/uL    Hemoglobin 16.4 11.7 - 17.7 g/dL    Hematocrit 50.0 35.0 - 53.0 %    MCV 85 78 - 100 fL    MCH 27.9 26.5 - 33.0 pg    MCHC 32.8 31.5 - 36.5 g/dL    RDW 13.2 10.0 - 15.0 %    Platelet Count 252 150 - 450 10e3/uL    % Neutrophils 57 %    % Lymphocytes 32 %    % Monocytes 9 %    % Eosinophils 2 %    % Basophils 0 %    % Immature Granulocytes 0 %    NRBCs per 100 WBC 0 <1 /100    Absolute Neutrophils 3.9 1.6 - 8.3 10e3/uL    Absolute Lymphocytes 2.2 0.8 - 5.3 10e3/uL    Absolute Monocytes 0.6 0.0 - 1.3 10e3/uL    Absolute Eosinophils 0.1 0.0 - 0.7 10e3/uL    Absolute " "Basophils 0.0 0.0 - 0.2 10e3/uL    Absolute Immature Granulocytes 0.0 <=0.4 10e3/uL    Absolute NRBCs 0.0 10e3/uL    Narrative    The generation of reference intervals for this test is currently based on binary male or female sex. If the electronic health record information indicates another gender identity or if Legal Sex is recorded as \"Unknown\", both male and female reference intervals are provided where applicable, and should be considered according to the individual's appropriate clinical context.   CBC with platelets differential     Status: None    Narrative    The following orders were created for panel order CBC with platelets differential.  Procedure                               Abnormality         Status                     ---------                               -----------         ------                     CBC with platelets and d...[[id number]]                      Final result                 Please view results for these tests on the individual orders.     Medications   sodium chloride 0.9% BOLUS 1,000 mL (0 mLs Intravenous Stopped 10/27/23 2037)   ketorolac (TORADOL) injection 15 mg (15 mg Intravenous $Given 10/27/23 1828)     Labs Ordered and Resulted from Time of ED Arrival to Time of ED Departure   MAGNESIUM - Abnormal       Result Value    Magnesium 2.4 (*)    HCG QUALITATIVE PREGNANCY - Normal    hCG Serum Qualitative Negative     PHOSPHORUS - Normal    Phosphorus 3.7     COMPREHENSIVE METABOLIC PANEL - Normal    Sodium 143      Potassium 4.4      Carbon Dioxide (CO2) 28      Anion Gap 12      Urea Nitrogen 7.9      Creatinine 0.90      GFR Estimate >90      Calcium 9.8      Chloride 103      Glucose 89      Alkaline Phosphatase 118      AST 29      ALT 24      Protein Total 7.5      Albumin 4.5      Bilirubin Total 0.4     CBC WITH PLATELETS AND DIFFERENTIAL    WBC Count 6.9      RBC Count 5.87      Hemoglobin 16.4      Hematocrit 50.0      MCV 85      MCH 27.9      MCHC 32.8      RDW 13.2   "    Platelet Count 252      % Neutrophils 57      % Lymphocytes 32      % Monocytes 9      % Eosinophils 2      % Basophils 0      % Immature Granulocytes 0      NRBCs per 100 WBC 0      Absolute Neutrophils 3.9      Absolute Lymphocytes 2.2      Absolute Monocytes 0.6      Absolute Eosinophils 0.1      Absolute Basophils 0.0      Absolute Immature Granulocytes 0.0      Absolute NRBCs 0.0       CT Head w/o Contrast   Final Result   Impression:   1. No acute intracranial pathology.   2. Osseous lesion within the right frontoethmoidal recess, possibly   representing a fibro-osseous lesion.      I have personally reviewed the examination and initial interpretation   and I agree with the findings.      ZOIE HAY MD            SYSTEM ID:  [id number]             Critical care was not performed.     Medical Decision Making  The patient's presentation was of moderate complexity (2 or more stable chronic illnesses).    The patient's evaluation involved:  review of 1 test result(s) ordered prior to this encounter (prior MRI baldev)  ordering and/or review of 3+ test(s) in this encounter (see separate area of note for details)    The patient's management necessitated moderate risk (prescription drug management including medications given in the ED).    Assessment & Plan    28yoM here for multiple complaints. He was afebrile, hemodynamically stable. Well appearing on exam. Differential included but was not limited to atypical migraine, infectious process including viral illness, intracranial pathology, metabolic derangement.    CBC showed no leukocytosis. Additionally with no fever or focal infectious symptoms an infectious process appears less likely. CMP showed no significant derangement to explain his symptoms.     CT head showed no acute intracranial process. Osseous lesion in the sinus noted but likely is a more chronic process and less likely related to his current headache and symptoms.     Patient was given fluids,  toradol. These findings were discussed with the patient. The patient was instructed to follow up with his primary care doctor and a referral to ENT was placed.     I have reviewed the nursing notes. I have reviewed the findings, diagnosis, plan and need for follow up with the patient.    Discharge Medication List as of 10/27/2023  8:56 PM          Final diagnoses:   Acute nonintractable headache, unspecified headache type   Facial pain         [facility] EMERGENCY DEPARTMENT  10/27/2023     Osbaldo Devries MD  10/27/23 2010

## 2023-10-28 NOTE — DISCHARGE INSTRUCTIONS
I have placed a referral for you to see ENT. [facility] will call you to coordinate your care as prescribed by the provider. If you don t hear from a representative within 2 business days, please call [phone].     I also recommend you follow up with your neurologist.    If you develop new or worsening symptoms please return to the emergency department.

## 2023-10-28 NOTE — ED NOTES
Pt received discharge paperwork, pt had no further questions for the RN of MD, IV removed and vitals taken, pt ambulated out of the ED with family member, pt stated they were able to get a ride to take them home

## 2023-11-24 NOTE — TELEPHONE ENCOUNTER
FUTURE VISIT INFORMATION      FUTURE VISIT INFORMATION:  Date: 1/25/2024  Time: 2 PM  [place] [facility] [place]  [place]ERRAL INFORMATION:  Referring provider:  Osbaldo Devries MD   Referring providers clinic:  [facility] Emergency Department  Reason for visit/diagnosis:  Facial pain/Right frontal sinus lesion. Referred by Osbaldo Devries MD. Appt per pt. Recs in [facility]. [facility]SC confirmed.     RECORDS REQUESTED FROM:       Clinic name Comments Records Status Imaging Status   [facility] Emergency Department 10/27/23 referral/ ER OV with Osbaldo Devries MD  [facility] Imaging CT head [facility]   11/1/23 ER OV with Tanesha Garcia PA   CE    [facility] medical imaging  Fax: [phone] CT head brain 11/10/23  CT temporal 11/10/23  MR head brain 8/10/22  CT head brain 8/9/22 CE Pending req  12/28/23  PACS   [facility]  11/7/23 ENT OV with Joel Zavala MD      5/22/23 + 3/24/22 ALLERGY OV with Alban Wang MD   CE      December 28, 2023 7:23 AM - images requested -[facility]  January 4, 2024 7:16 AM - images in pacs -megha

## 2023-12-04 ENCOUNTER — MYC MEDICAL ADVICE (OUTPATIENT)
Dept: ENDOCRINOLOGY | Facility: CLINIC | Age: 28
End: 2023-12-04
Payer: COMMERCIAL

## 2024-01-22 ENCOUNTER — TELEPHONE (OUTPATIENT)
Dept: PSYCHOLOGY | Facility: CLINIC | Age: 29
End: 2024-01-22
Payer: COMMERCIAL

## 2024-01-22 NOTE — TELEPHONE ENCOUNTER
Patient needs to be rescheduled for their virtual visit due to Reason for Reschedule: No-Show    Appointment mode: Video  Provider: Carla Cunningham     Unable to reach pt for their virtual visit check-in.    Please reach out to pt to reschedule as needed.    Kami Wyatt,  VVF

## 2024-01-24 ENCOUNTER — VIRTUAL VISIT (OUTPATIENT)
Dept: PSYCHOLOGY | Facility: CLINIC | Age: 29
End: 2024-01-24
Payer: COMMERCIAL

## 2024-01-24 DIAGNOSIS — J45.20 MILD INTERMITTENT ASTHMA WITHOUT COMPLICATION: ICD-10-CM

## 2024-01-24 DIAGNOSIS — G89.29 OTHER CHRONIC PAIN: ICD-10-CM

## 2024-01-24 DIAGNOSIS — G90.A POTS (POSTURAL ORTHOSTATIC TACHYCARDIA SYNDROME): ICD-10-CM

## 2024-01-24 DIAGNOSIS — M79.7 FIBROMYALGIA: ICD-10-CM

## 2024-01-24 DIAGNOSIS — F44.5 PSYCHOGENIC NONEPILEPTIC SEIZURE: ICD-10-CM

## 2024-01-24 DIAGNOSIS — M35.9 CONNECTIVE TISSUE DISORDER (H): ICD-10-CM

## 2024-01-24 DIAGNOSIS — E16.2 HYPOGLYCEMIA: ICD-10-CM

## 2024-01-24 DIAGNOSIS — F54 PSYCHOLOGICAL FACTORS AFFECTING MEDICAL CONDITION: ICD-10-CM

## 2024-01-24 DIAGNOSIS — E16.1 HYPERINSULINEMIA: Primary | ICD-10-CM

## 2024-01-24 PROCEDURE — 96158 HLTH BHV IVNTJ INDIV 1ST 30: CPT | Mod: 95 | Performed by: PSYCHOLOGIST

## 2024-01-24 PROCEDURE — 96159 HLTH BHV IVNTJ INDIV EA ADDL: CPT | Mod: 95 | Performed by: PSYCHOLOGIST

## 2024-01-24 ASSESSMENT — ANXIETY QUESTIONNAIRES
5. BEING SO RESTLESS THAT IT IS HARD TO SIT STILL: MORE THAN HALF THE DAYS
4. TROUBLE RELAXING: MORE THAN HALF THE DAYS
6. BECOMING EASILY ANNOYED OR IRRITABLE: SEVERAL DAYS
IF YOU CHECKED OFF ANY PROBLEMS ON THIS QUESTIONNAIRE, HOW DIFFICULT HAVE THESE PROBLEMS MADE IT FOR YOU TO DO YOUR WORK, TAKE CARE OF THINGS AT HOME, OR GET ALONG WITH OTHER PEOPLE: SOMEWHAT DIFFICULT
7. FEELING AFRAID AS IF SOMETHING AWFUL MIGHT HAPPEN: SEVERAL DAYS
3. WORRYING TOO MUCH ABOUT DIFFERENT THINGS: MORE THAN HALF THE DAYS
8. IF YOU CHECKED OFF ANY PROBLEMS, HOW DIFFICULT HAVE THESE MADE IT FOR YOU TO DO YOUR WORK, TAKE CARE OF THINGS AT HOME, OR GET ALONG WITH OTHER PEOPLE?: SOMEWHAT DIFFICULT
GAD7 TOTAL SCORE: 11
7. FEELING AFRAID AS IF SOMETHING AWFUL MIGHT HAPPEN: SEVERAL DAYS
GAD7 TOTAL SCORE: 11
1. FEELING NERVOUS, ANXIOUS, OR ON EDGE: SEVERAL DAYS
2. NOT BEING ABLE TO STOP OR CONTROL WORRYING: MORE THAN HALF THE DAYS

## 2024-01-24 NOTE — PROGRESS NOTES
Health Psychology          Sonali Morris, Ph.D.,  [phone]  Anastasiya Pelayo, Ph.D.,  [phone]  Brandie Daniels, Ph.D.,  [phone]  Melody Lanza, Ph.D., , [facility] [phone]  Chin Mai, Ph.D., , [facility] [phone]  Isaura Hatch, Ph.D.,  [phone]  Carla Cunningham, Ph.D., , [facility] [phone]    [facility], 3rd Floor  [address] [place] [address]        Health Behavior Intervention    Length of Session: 48 minutes              Start Time: 1:00 PM              Stop Time: 1:48 PM      : ALBERTO  Type: Follow-up  Session #: 1 (new episode of care)  Referred by: Greta Cannon PA-C    Telemedicine Information    Telemedicine Visit: The patient's condition can be safely assessed and treated via synchronous audio and visual telemedicine encounter.    Reason for Telemedicine Visit: Patient has requested telehealth visit and Patient convenience (e.g. access to timely appointments / distance to available provider)  Originating Site (Patient Location): Patient's home, [place]  Distant Location (provider location): Off-site: Provider's Home Office  Consent:  The patient/guardian has verbally consented to: the potential risks and benefits of telemedicine (video visit) versus in person care; bill my insurance or make self-payment for services provided; and responsibility for payment of non-covered services.   Mode of Communication:  Video Conference via [employer]  As the provider I attest to compliance with applicable laws and regulations related to telemedicine.     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 Was referred for health psychology services by endocrinology provider, Greta Cannon PA-C for assistance with stress in the context of multiple, chronic health conditions during a visit in which he was being evaluated for hypoglycemia.      Session Content:    Update: Patient returning for a new episode of care after a 5 month hiatus. Reports that he had a significant exacerbation of mental health symptoms over the past several months and had a sudden onset of psychotic symptoms in the fall. Was hospitalized and also seen for consultation in the HOPE program at [facility]. Also reports that he was approved for social security but not disability since our last visit.     Promoting Functional Improvement, Minimizing Barriers, and Management/Coping with Medical Condition(s): Discussed goals for new course of treatment with health psychology. Goals for new course of treatment include addressing physical and psychological triggers for psychogenic seizures, improving pain management, improving dietary habits, increasing physical activity/movement, and BG regulation. Also discussed the possibility that patient may have an autism spectrum disorder. He notes that he was informed by previous provider that he likely is autistic (prefers \"I am autistic\" versus person-first language) and he also notes that his current therapist at [place] has advised him that he appears to meet criteria for an autism spectrum disorder as well. Discussed how the focus of our work will need to include sensitivity to patient's sensory concerns while trying to increase tolerance of uncomfortable sensations. Will continue to discuss and assess this as our work progresses. Next step will be for patient to get a new CGM sensor placed so that we can monitor his BG and support his goals around keeping his BG in range.              Top 6 values identified include: " creativity, kindness, mindfulness, persistence, safety, and skillfulness. They note that creativity, kindness, and mindfulness are the values that he feels most distant from.    Patient Education:  Patient was taught cognitive and behavioral strategies to address lifestyle and behavioral factors associated with medical concerns.  Patient was educated about: health behavior change.    Patient was given handouts on: none today.    Treatment Goals & Progress:    Treatment Goals:   Identifying triggers for and preventing psychogenic seizures (triggers include hypoglycemia, loud noises, physical and emotional overstimulation, and pain)  Increase participation in meaningful and purposeful activities outside of the home  Increase independence  Improve dietary habits  Resume SNAP benefits  Make healthy choices at food shelves  Increase physical activity/movement  BG regulation   Progress: In progress    Mental Status Exam:  Appearance: Appropriate   Eye Contact: Good    Orientation: Yes, x4  Behavior/relationship to provider/demeanor: Cooperative, Engaged and Pleasant  Motor Activity: normal or unremarkable  Mood (subjective report): More stable  Affect (objective appearance): Appropriate/mood congruent   Speech Rate: Normal  Speech Volume: Normal  Speech Articulation: Normal  Speech Coherence: Normal  Speech Spontaneity: Normal  Thought Content: No active SI, plans, or intent, endorses depressive and anxious cognitions  Thought Process (associations): Logical, Linear and Goal directed  Thought Process (rate): Normal  Abnormal Perception: None  Attention/Concentration: Normal  Memory: Appears grossly intact  Fund of Knowledge: Appears within normal limits  Abstraction:  Normal  Insight: Good  Judgment: Good    Diagnoses:    1. Hyperinsulinemia    2. Hypoglycemia    3. Fibromyalgia    4. Other chronic pain    5. Connective tissue disorder (H24)    6. POTS (postural orthostatic tachycardia syndrome)    7. Mild intermittent  asthma without complication    8. Psychological factors affecting medical condition    9. Psychogenic nonepileptic seizure      Medications:    Current Outpatient Medications   Medication    ACCU-CHEK GUIDE test strip    albuterol (PROAIR HFA/PROVENTIL HFA/VENTOLIN HFA) 108 (90 Base) MCG/ACT inhaler    azelastine (ASTELIN) 0.1 % nasal spray    baclofen (LIORESAL) 10 MG tablet    buPROPion (WELLBUTRIN XL) 150 MG 24 hr tablet    calcipotriene (DOVONOX) 0.005 % external solution    cetirizine (ZYRTEC) 10 MG tablet    clindamycin-benzoyl peroxide (BENZACLIN) 1-5 % external gel    clotrimazole (LOTRIMIN) 1 % external cream    CONCERTA 54 MG CR tablet    Continuous Blood Gluc Sensor (FREESTYLE JOSSY 2 SENSOR) MISC    diazepam (VALIUM) 5 MG tablet    diphenhydrAMINE (BENADRYL) 25 MG capsule    EPINEPHrine (ANY BX GENERIC EQUIV) 0.3 MG/0.3ML injection 2-pack    estradiol cypionate (DEPO-ESTRADIOL) 5 MG/ML injection    famotidine (PEPCID) 20 MG tablet    gabapentin (NEURONTIN) 300 MG capsule    HYDROcodone-acetaminophen (NORCO) 5-325 MG tablet    ibuprofen (ADVIL/MOTRIN) 200 MG capsule    ketoconazole (NIZORAL) 2 % external shampoo    loratadine (CLARITIN) 10 MG tablet    metFORMIN (GLUCOPHAGE) 500 MG tablet    Methylphenidate HCl (RITALIN PO)    minoxidil (LONITEN) 2.5 MG tablet    Minoxidil 5 % FOAM    montelukast (SINGULAIR) 10 MG tablet    ondansetron (ZOFRAN ODT) 4 MG ODT tab    OTHER MEDICAL SUPPLIES    oxybutynin ER (DITROPAN XL) 10 MG 24 hr tablet    oxybutynin ER (DITROPAN XL) 5 MG 24 hr tablet    propranolol ER (INDERAL LA) 160 MG 24 hr capsule    Specialty Vitamins Products (VITAMINS FOR HAIR) CAPS    SUMAtriptan (IMITREX) 50 MG tablet    tacrolimus (PROTOPIC) 0.1 % external ointment    testosterone (ANDROGEL 1.62 % PUMP) 20.25 MG/ACT gel    tretinoin (RETIN-A) 0.05 % external cream    ubrogepant (UBRELVY) 50 MG tablet    venlafaxine (EFFEXOR-ER) 150 MG TB24 24 hr tablet    vitamin D3 (CHOLECALCIFEROL) 10 MCG (400  UNIT) capsule     Current Facility-Administered Medications   Medication    ciprofloxacin (CIPRO) tablet 500 mg       Plan:    Patient to follow-up on 2/20/24 at 2:00pm for continued health behavior intervention to address stress in the context of multiple medical concerns.  Primary care needs and medications are managed by Jessica Cardoso MD at [facility]. Referring provider is Greta Cannon PA-C.   Patient's primary mental health needs are addressed by a mental health team at [facility] (therapist is at this facility) and [facility] (psychiatry provider is at this facility).   Patient was seen for consultation by the HOPE Program at [facility].    Patient to contact [facility], [facility] or other community resources if there was a psychiatric emergency.  Next visit:   Get new CGM sensor placed  Introduce behavioral pain management    Skills taught/interventions used thus far:  Psychoeducation  Values clarification  Motivational interviewing  Willingness and action plan  Mindfulness (breath, body scan)    Carla Cunningham, Ph.D., , [facility]  Clinical Health Psychologist  Phone: [phone]   Pager: [phone]

## 2024-01-25 ENCOUNTER — OFFICE VISIT (OUTPATIENT)
Dept: OTOLARYNGOLOGY | Facility: CLINIC | Age: 29
End: 2024-01-25
Attending: EMERGENCY MEDICINE
Payer: COMMERCIAL

## 2024-01-25 ENCOUNTER — PRE VISIT (OUTPATIENT)
Dept: OTOLARYNGOLOGY | Facility: CLINIC | Age: 29
End: 2024-01-25

## 2024-01-25 VITALS
DIASTOLIC BLOOD PRESSURE: 70 MMHG | OXYGEN SATURATION: 98 % | BODY MASS INDEX: 35.27 KG/M2 | WEIGHT: 211.7 LBS | SYSTOLIC BLOOD PRESSURE: 123 MMHG | HEART RATE: 71 BPM | HEIGHT: 65 IN

## 2024-01-25 DIAGNOSIS — J30.2 SEASONAL ALLERGIC RHINITIS, UNSPECIFIED TRIGGER: Primary | ICD-10-CM

## 2024-01-25 DIAGNOSIS — R51.9 FACIAL PAIN: ICD-10-CM

## 2024-01-25 DIAGNOSIS — L20.9 ATOPIC DERMATITIS, UNSPECIFIED TYPE: ICD-10-CM

## 2024-01-25 PROCEDURE — 99204 OFFICE O/P NEW MOD 45 MIN: CPT | Mod: 25 | Performed by: STUDENT IN AN ORGANIZED HEALTH CARE EDUCATION/TRAINING PROGRAM

## 2024-01-25 PROCEDURE — 31231 NASAL ENDOSCOPY DX: CPT | Performed by: STUDENT IN AN ORGANIZED HEALTH CARE EDUCATION/TRAINING PROGRAM

## 2024-01-25 RX ORDER — METHYLPHENIDATE HYDROCHLORIDE 18 MG/1
18 TABLET ORAL
COMMUNITY

## 2024-01-25 RX ORDER — LEVETIRACETAM 500 MG/1
1000 TABLET ORAL 2 TIMES DAILY
COMMUNITY

## 2024-01-25 RX ORDER — FLUTICASONE PROPIONATE 50 MCG
2 SPRAY, SUSPENSION (ML) NASAL 2 TIMES DAILY
Qty: 16 G | Refills: 11 | Status: SHIPPED | OUTPATIENT
Start: 2024-01-25

## 2024-01-25 ASSESSMENT — PAIN SCALES - GENERAL: PAINLEVEL: MILD PAIN (2)

## 2024-01-25 ASSESSMENT — PATIENT HEALTH QUESTIONNAIRE - PHQ9: SUM OF ALL RESPONSES TO PHQ QUESTIONS 1-9: 14

## 2024-01-25 NOTE — LETTER
1/25/2024       RE: Gregg Maharaj  [address]  [address]  [place] [address]     Dear Colleague,    Thank you for referring your patient, Gregg Maharaj, to the [facility] [place] [facility]. Please see a copy of my visit note below.      [facility]  New Patient Visit      Encounter date:   January 25, 2024    Referring Provider:   Osbaldo Devries MD  [address] [place] [address]    Reason for Visit: New Patient      History of Present Illness:      Gregg Maharaj is a 28 year old adult who presents for consultation regarding facial pain and right frontal sinus lesion. He states having a history of recurrent sinus infection that would turn into an ear infections as a kid. He states having sinus infections on a yearly basis. He endorses nasal drainage from the right naris, constant facial pressure/fullness, and headaches. 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 He states having face spasm that are helped with facial massages. However, massaging his face can cause postnasal drip.     Patient is not able to have prick allergy testing as he has contact dermatitis. He reports having blood tests to test for allergies which were negative but does experience allergy like symptoms especially during [place] season and with increased dust exposure. He reports a sense of relief with Astelin, Loraditine, Montelukast of some symptoms but not all of them.       Family history of fibrous dysplasia (mom) and osteoporosis (mom). Recalls having surgery to remove polyp in vocal chords. Patient also endorses history of connective tissue disorder. Recent CT scans luckily negative for sinus infections. Notes extensive hx of migranes with episodes in middle school  "that made him want to \"rip his eyes out\"      Number of episodes per year: Yearly  Facial pain/pressure: yes  Nasal drainage: yes  Nasal obstruction:  Changes in smell or taste: n/a  Epistaxis: n/a    History of asthma/allergy: yes    Prior medication trials: Astelin, Loraditine, Montelukast  Prior sinus surgery: n/a  Prior allergy testing with positives:  Prior CT scans: Yes    Other otolaryngic complaints: headaches    Sino-Nasal Outcome Test (SNOT - 22)  1. Need to Blow Nose: (P) Very mild  2. Nasal Blockage: (P) Moderate  3. Sneezing: (P) None  4. Runny Nose: (P) Very mild  5. Cough: (P) Very mild  6. Post-nasal discharge: (P) Moderate  7. Thick nasal discharge: (P) Severe  8. Ear fullness: (P) Moderate  9. Dizziness: (P) Moderate  10. Ear Pain: (P) Very mild  11. Facial pain/pressure: (P) Severe  12. Decreased Sense of Smell/Taste: (P) Moderate  13. Difficulty falling asleep: (P) Moderate  14. Wake up at night: (P) None  15. Lack of a good night's sleep: (P) Moderate  16. Wake up tired: (P) Severe  17. Fatigue: (P) Severe  18. Reduced Productivity: (P) Severe  19. Reduced Concentration: (P) Bad as it can be  20. Frustrated/restless/irritable: (P) Bad as it can be  21. Sad: (P) Moderate  22. Embarrassed: (P) Moderate  Total Score: (P) 61       Review of Systems:  A comprehensive 14-point review of systems was performed with positives and pertinent negatives listed in the HPI.    Past Medical/Surgical History:  Reviewed today with the patient. No history of major medical comorbidities. Does not take any blood thinners. No prior history of sinonasal surgery or trauma.    Allergies:       Allergies   Allergen Reactions    Capsaicin Anaphylaxis    Capsicum Annuum Extract & Derivative (Bell Pepper) [Capsicum] Anaphylaxis    Food Shortness Of Breath     Red chili peppers (harissa paste)    No Clinical Screening - See Comments Anaphylaxis, Dermatitis, Hives, Itching, Rash and Shortness Of Breath     Cotton seed " oil  Cotton seed oil    Adhesive Tape      Other reaction(s): Atopic Dermatitis ALSO DERMABOND - PT HAD HIVES  Other reaction(s): Atopic Dermatitis  Other reaction(s): Atopic Dermatitis  ALSO PT REACTED TO ABSORBABLE SUTURES - BODY REJECTION, PUS, SWELLING AND REDNESS.  Allergic to adhesive tape that is not silicone.     Benadryl [Diphenhydramine] Hallucination     Auditory hallucination from benadryl toxicity    Kiwi     Other Environmental Allergy Hives     Cotton seed oil    Tomato     Metoclopramide Anxiety     Makes pt feel claustrophobic    Oxycodone Other (See Comments)     Dysphoria and insomnia       Medical History:  Past Medical History:   Diagnosis Date    ADHD (attention deficit hyperactivity disorder)     VERNON positive     Anxiety     Asthma     Borderline personality disorder (H)     Concussion     Depression     Fibromyalgia     Gastroesophageal reflux disease with esophagitis     Gender dysphoria in adolescent and adult     Hypermobility of joint     Hypersomnia     Migraine     Mucous retention cyst of maxillary sinus     Obesity     PTSD (post-traumatic stress disorder)     Seasonal allergic rhinitis     Urinary frequency         Surgical History:   Past Surgical History:   Procedure Laterality Date    Direct microlaryngoscopy with biopsy  06/29/2022    REVISE SCAR BREAST Bilateral 8/18/2023    Procedure: REVISION, SCAR, BREAST - s/p bilateral transgender mastectomy (incisions, dogears, nipple revision);  Surgeon: Anita Grant MD;  Location: [facility] OR    TRANSGENDER MASTECTOMY Bilateral 9/26/2022    Procedure: MASTECTOMY, BILATERAL, SIMPLE, nipple grafts. OnQ;  Surgeon: Anita Grant MD;  Location: [facility] OR        Family History:  Family History   Problem Relation Age of Onset    Anesthesia Reaction No family hx of     Deep Vein Thrombosis (DVT) No family hx of         Social History:   Social History     Socioeconomic History    Marital status: Single   Tobacco Use    Smoking status:  "Never     Passive exposure: Never    Smokeless tobacco: Never   Vaping Use    Vaping Use: Never used            Family History:  Reviewed with patient. No pertinent positives.    Physical Examination:  /70 (BP Location: Right arm, Patient Position: Sitting, Cuff Size: Adult Regular)   Pulse 71   Ht 1.651 m (5' 5\")   Wt 96 kg (211 lb 11.2 oz)   SpO2 98%   BMI 35.23 kg/m      Constitutional/Psychiatric: This is a well-appearing, well-dressed patient in no acute distress. adult communicates easily with no obvious speech issues. Oriented to self and environment with appropriate conversation. Does not appear depressed, anxious, or agitated.  Head: Normocephalic. Overall inspection reveals no prior scars, lesions, or masses. Facial motion is symmetric. No sinus tenderness.  Eyes: Extra-ocular motions are intact with symmetric gaze. Conjunctiva clear. No eyelid lesions. Pupils round, symmetric, and reactive to light.  Ears: Auricles without masses or lesions bilaterally. External auditory canals clear with minimal non-obstructive cerumen. Tympanic membranes intact without middle ear fluid or retraction. Hearing intact grossly at conversational volume.  Oral Cavity/Oropharynx: Lips, gingiva and teeth appear healthy. Floor of mouth without masses or lesions, normal appearing salivary glands. Oral mucosal membranes are well-hydrated. Tongue is mobile without deformity. Palate elevates symmetrically. No lesions of the posterior pharynx or tonsillar fossa.  Larynx/Hypopharynx: Indirect mirror examination demonstrates normal laryngeal anatomy  Neck/Lymphatic: Flat, symmetric without detectable lymphadenopathy. No thyroid/salivary gland tenderness or palpable nodules.  Respiratory: No increased work of breathing or respiratory distress. No audible stridor or stertor.  Peripheral/Cardiovascular: Brisk capillary refill bilaterally.   Neurologic: Cranial nerves II-XII grossly intact as tested.    Nasal examination: No " lesions, masses, or scars of the external nose are visualized. I do not appreciate any external deviation of the bony nasal vault. External valves patent without inspiratory alar collapse. Columella is midline.    Imaging Review:    MR Brain w/o Contrast 12/14/23  Impression  Normal MRA of the head.     CT Head w/o Contrast 11/10/23  Impression  No acute intracranial abnormality.    CT Temporal w/o Contrast 11/02/23  Impression  1.  Normal temporal bone CT.    CT HEAD W/O CONTRAST 10/27/2023   Impression:  1. No acute intracranial pathology.  2. Osseous lesion within the right frontoethmoidal recess, possibly  representing a fibro-osseous lesion.    MR HEAD BRAIN WWO 08/10/2022  Impression  1. No significant intracranial pathology. In particular, no evidence of intracranial hemorrhage.    CT HEAD W/O CONTRAST 08/09/2022  Impression:   Subtle hyperdensities along the bifrontal cortical sulci. Findings may be artifactual or related to vasculature although subtle subarachnoid hemorrhage cannot be completely excluded. Consider short-term 4-6 hour follow-up CT.         Procedure Note: Diagnostic Nasal Endoscopy, CPT 28741  Date of Service: January 25, 2024  Provider: Faizan Yanez MD   Presumptive Diagnosis: No primary diagnosis found.  Anesthesia: Topical lidocaine and oxymetazoline via atomizer    Indication:  Evaluation of nasal/sinus complaints; inadequate visualization with anterior rhinoscopy    Description of procedure:  After obtaining consent for the procedure from the patient, the sinonasal cavity was sprayed with topical anesthetic. A rigid 30-degree nasal endoscope was used to first used to visualize the nasal floor and the nasopharynx on the left. A second pass was then made to visualize the middle meatus and sphenoethmoid recess. Finally, the scope was turned 90-degrees and used to visualize the olfactory cleft and frontal outflow tract. A similar approach was used for the contralateral side. She tolerated  the procedure well without difficulty.    Endoscopic Findings:    Carissa-Sean Endoscopic Scoring System  Endoscopic observation Right Left   Polyps in middle meatus (0 = absent, 1 = restricted to middle meatus, 2 = Beyond middle meatus) 0 0   Discharge (0 = absent, 1 = thin and clear, 2 = thick, purulent) 0 0   Edema (0 = absent, 1 = mild-moderate, 2 = moderate-severe) 0 0   Crusting (0 = absent, 1 = mild-moderate, 2 = moderate-severe) 0 0   Scarring (0= absent, 1 = mild-moderate, 2 = moderate-severe) 0 0   Total 0 0       Bilateral minimal sinonasal edema is noted without mucoid drainage.  No nasal polyposis or mucopurulent drainage is seen within the nasal cavity.  The middle meatus is clear without polyps.  The superior meatus is clear without polyps.  The sphenoethmoid recess and olfactory cleft are clear bilaterally.  No nasopharyngeal lesions are noted.  The eustachian tubes are patent and non-obstructed.      Final Assessment/Plan:  1.Start the patient on Flonase (twice a day two sprays) and continue Astelin. Discussed that these sprays are often synergistic when it comes to allergy control  2.Recommended he follow up with an allergist as he was previously established with one. Will send consult to local one  3.We are going to put in an order for a CT scan in the future, Discussed importance of determining chronic sinus symptoms vs. Allergic exacerbations. CT during symptoms will help with that  4. Had extensive discussion that the lesion in his frontal sinus is most likely not an osteoma and is most likely either fibrous dysplasia vs. A sinus that has auto-obliterated. Plan would be to follow with yearly CT for 4-5 years and if no growth would have very low concern.   5.Place in a referral to my Facial Pain colleagues. Patient has multiple areas of facial pain including retro-orbital pain, pain over the frontal sinus, and sharp pains in the cheeks and by the ears. CT does not demonstrate obvious source and  do not believe his ossified frontal sinus explains these findings.    6.Follow up in 4 months with me       Scribe Disclosure:   I, Marge Alexander, am serving as a scribe; to document services personally performed by Faizan Yanez MD -based on data collection and the provider's statements to me.     Provider Disclosure:  I agree with above History, Review of Systems, Physical exam and Plan.  I have reviewed the content of the documentation and have edited it as needed. I have personally performed the services documented here and the documentation accurately represents those services and the decisions I have made.      Electronically signed by:  Faizan Yanez MD    [facility]  Rhinology  Endoscopic Skull Base Surgery  [facility]  Department of Otolaryngology - Head & Neck Surgery

## 2024-01-25 NOTE — NURSING NOTE
"Chief Complaint   Patient presents with    Consult   Blood pressure 123/70, pulse 71, height 1.651 m (5' 5\"), weight 96 kg (211 lb 11.2 oz), SpO2 98%, not currently breastfeeding. Ronald Villegas, EMT    "

## 2024-01-25 NOTE — PATIENT INSTRUCTIONS
You were seen in the ENT Clinic today by Dr. Yanez. If you have any questions or concerns after your appointment, please contact us (see below)       2.   The following recommendations have been made based upon your appointment today:   -Flonase (fluticasone) nasal spray: Spray 2 sprays per nostril twice daily.   -Perform sinus rinses twice daily. Make sure that you do the rinse before the nasal spray so that you don't wash the medication away.   -We have placed an order for a CT scan. You will obtain this when you have symptoms. Please call to schedule when you develop symptoms at [phone]. Dr. Yanez will follow up on your results once he has reviewed the images.   -We have place an order for you to see an allergist. They will call you to scheduled.   -We have also placed an order to our facial pain clinic. They will call you to schedule this appointment.       3.   Please return to the ENT clinic in 4 months.           How to Contact Us:  Send a [employer] message to your provider. Our team will respond to you via [employer]. Occasionally, we will need to call you to get further information.  For urgent matters (Monday-Friday), call the ENT Clinic: [phone] and speak with a call center team member - they will route your call appropriately.   If you'd like to speak directly with a nurse, please find our contact information below. We do our best to check voicemail frequently throughout the day, and will work to call you back within 1-2 days. For urgent matters, please use the general clinic phone numbers listed above.        Rachel MOSELEY RN  ENT RN Care Coordinator  Direct: [phone]  Christa ESCOBEDO LPN  Direct: [phone]         [facility]  Department of Otolaryngology          Cleaning of the Nasal or Sinus Cavity    Please follow all three steps.  Nasal irrigation (cleaning) should be done 2 times/day.    Preparation:  1.  Wash your hands  2.  We suggest that you buy the [employer] Sinus Rinse Kit  3.  Use  distilled water or tap water that has been boiled and brought to room temperature.  4.  Fill the irrigation bottle with room temperature water (distilled or boiled tap water) and add mixture (pre made packet or homemade recipe).        If you wish to make a homemade recipe:        Mix 1/4 teaspoon (kosher, non-iodine) salt with 1/4 teaspoon baking soda in each bottle.    Cleansing Irrigation/Sinus Rinse:    1.  Lean forward over a sink and insert the rinse bottle applicator into the right side of your nose.    2.  Tilt head down and to the left side.  With your mouth open (breathing through your mouth), gently direct the water around the inside of your nose until clear fluid starts to drain from the opposite nostril.  This is called flushing or irrigation.    3.  When you have used 1/4 to 1/2 or the solution, switch to the left nostril.    4.  To irrigate the left nostril, tilt your head down and to the right side.  With your mouth open (breathing through your mouth),  gently direct the water around the inside of your nose until clear fluid starts to drain from the opposite nostril.     Cleaning the Equipment:  1.  Throw away any leftover solution  2.  Clean the rinse bottle and cap with clear water. Air dry.      Call the ENT Clinic if you have any questions or concerns at [phone]

## 2024-01-25 NOTE — PROGRESS NOTES
"  [facility]  New Patient Visit      Encounter date:   January 25, 2024    Referring Provider:   Osbaldo Devries MD  [address] [place] [address]    Reason for Visit: New Patient      History of Present Illness:      Gregg Maharaj is a 28 year old adult who presents for consultation regarding facial pain and right frontal sinus lesion. He states having a history of recurrent sinus infection that would turn into an ear infections as a kid. He states having sinus infections on a yearly basis. He endorses nasal drainage from the right naris, constant facial pressure/fullness, and headaches. 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 He states having face spasm that are helped with facial massages. However, massaging his face can cause postnasal drip.     Patient is not able to have prick allergy testing as he has contact dermatitis. He reports having blood tests to test for allergies which were negative but does experience allergy like symptoms especially during [place] season and with increased dust exposure. He reports a sense of relief with Astelin, Loraditine, Montelukast of some symptoms but not all of them.       Family history of fibrous dysplasia (mom) and osteoporosis (mom). Recalls having surgery to remove polyp in vocal chords. Patient also endorses history of connective tissue disorder. Recent CT scans luckily negative for sinus infections. Notes extensive hx of migranes with episodes in middle school that made him want to \"rip his eyes out\"      Number of episodes per year: Yearly  Facial pain/pressure: yes  Nasal drainage: yes  Nasal obstruction:  Changes in smell or taste: n/a  Epistaxis: n/a    History of asthma/allergy: yes    Prior medication trials: Astelin, Loraditine, Montelukast  Prior sinus surgery: n/a  Prior allergy " testing with positives:  Prior CT scans: Yes    Other otolaryngic complaints: headaches    Sino-Nasal Outcome Test (SNOT - 22)  1. Need to Blow Nose: (P) Very mild  2. Nasal Blockage: (P) Moderate  3. Sneezing: (P) None  4. Runny Nose: (P) Very mild  5. Cough: (P) Very mild  6. Post-nasal discharge: (P) Moderate  7. Thick nasal discharge: (P) Severe  8. Ear fullness: (P) Moderate  9. Dizziness: (P) Moderate  10. Ear Pain: (P) Very mild  11. Facial pain/pressure: (P) Severe  12. Decreased Sense of Smell/Taste: (P) Moderate  13. Difficulty falling asleep: (P) Moderate  14. Wake up at night: (P) None  15. Lack of a good night's sleep: (P) Moderate  16. Wake up tired: (P) Severe  17. Fatigue: (P) Severe  18. Reduced Productivity: (P) Severe  19. Reduced Concentration: (P) Bad as it can be  20. Frustrated/restless/irritable: (P) Bad as it can be  21. Sad: (P) Moderate  22. Embarrassed: (P) Moderate  Total Score: (P) 61       Review of Systems:  A comprehensive 14-point review of systems was performed with positives and pertinent negatives listed in the HPI.    Past Medical/Surgical History:  Reviewed today with the patient. No history of major medical comorbidities. Does not take any blood thinners. No prior history of sinonasal surgery or trauma.    Allergies:       Allergies   Allergen Reactions    Capsaicin Anaphylaxis    Capsicum Annuum Extract & Derivative (Bell Pepper) [Capsicum] Anaphylaxis    Food Shortness Of Breath     Red chili peppers (harissa paste)    No Clinical Screening - See Comments Anaphylaxis, Dermatitis, Hives, Itching, Rash and Shortness Of Breath     Cotton seed oil  Cotton seed oil    Adhesive Tape      Other reaction(s): Atopic Dermatitis ALSO DERMABOND - PT HAD HIVES  Other reaction(s): Atopic Dermatitis  Other reaction(s): Atopic Dermatitis  ALSO PT REACTED TO ABSORBABLE SUTURES - BODY REJECTION, PUS, SWELLING AND REDNESS.  Allergic to adhesive tape that is not silicone.     Benadryl  "[Diphenhydramine] Hallucination     Auditory hallucination from benadryl toxicity    Kiwi     Other Environmental Allergy Hives     Cotton seed oil    Tomato     Metoclopramide Anxiety     Makes pt feel claustrophobic    Oxycodone Other (See Comments)     Dysphoria and insomnia       Medical History:  Past Medical History:   Diagnosis Date    ADHD (attention deficit hyperactivity disorder)     VERNON positive     Anxiety     Asthma     Borderline personality disorder (H)     Concussion     Depression     Fibromyalgia     Gastroesophageal reflux disease with esophagitis     Gender dysphoria in adolescent and adult     Hypermobility of joint     Hypersomnia     Migraine     Mucous retention cyst of maxillary sinus     Obesity     PTSD (post-traumatic stress disorder)     Seasonal allergic rhinitis     Urinary frequency         Surgical History:   Past Surgical History:   Procedure Laterality Date    Direct microlaryngoscopy with biopsy  06/29/2022    REVISE SCAR BREAST Bilateral 8/18/2023    Procedure: REVISION, SCAR, BREAST - s/p bilateral transgender mastectomy (incisions, dogears, nipple revision);  Surgeon: Anita Grant MD;  Location: [facility] OR    TRANSGENDER MASTECTOMY Bilateral 9/26/2022    Procedure: MASTECTOMY, BILATERAL, SIMPLE, nipple grafts. OnQ;  Surgeon: Anita Grant MD;  Location: [facility] OR        Family History:  Family History   Problem Relation Age of Onset    Anesthesia Reaction No family hx of     Deep Vein Thrombosis (DVT) No family hx of         Social History:   Social History     Socioeconomic History    Marital status: Single   Tobacco Use    Smoking status: Never     Passive exposure: Never    Smokeless tobacco: Never   Vaping Use    Vaping Use: Never used            Family History:  Reviewed with patient. No pertinent positives.    Physical Examination:  /70 (BP Location: Right arm, Patient Position: Sitting, Cuff Size: Adult Regular)   Pulse 71   Ht 1.651 m (5' 5\")   Wt 96 " kg (211 lb 11.2 oz)   SpO2 98%   BMI 35.23 kg/m      Constitutional/Psychiatric: This is a well-appearing, well-dressed patient in no acute distress. adult communicates easily with no obvious speech issues. Oriented to self and environment with appropriate conversation. Does not appear depressed, anxious, or agitated.  Head: Normocephalic. Overall inspection reveals no prior scars, lesions, or masses. Facial motion is symmetric. No sinus tenderness.  Eyes: Extra-ocular motions are intact with symmetric gaze. Conjunctiva clear. No eyelid lesions. Pupils round, symmetric, and reactive to light.  Ears: Auricles without masses or lesions bilaterally. External auditory canals clear with minimal non-obstructive cerumen. Tympanic membranes intact without middle ear fluid or retraction. Hearing intact grossly at conversational volume.  Oral Cavity/Oropharynx: Lips, gingiva and teeth appear healthy. Floor of mouth without masses or lesions, normal appearing salivary glands. Oral mucosal membranes are well-hydrated. Tongue is mobile without deformity. Palate elevates symmetrically. No lesions of the posterior pharynx or tonsillar fossa.  Larynx/Hypopharynx: Indirect mirror examination demonstrates normal laryngeal anatomy  Neck/Lymphatic: Flat, symmetric without detectable lymphadenopathy. No thyroid/salivary gland tenderness or palpable nodules.  Respiratory: No increased work of breathing or respiratory distress. No audible stridor or stertor.  Peripheral/Cardiovascular: Brisk capillary refill bilaterally.   Neurologic: Cranial nerves II-XII grossly intact as tested.    Nasal examination: No lesions, masses, or scars of the external nose are visualized. I do not appreciate any external deviation of the bony nasal vault. External valves patent without inspiratory alar collapse. Columella is midline.    Imaging Review:    MR Brain w/o Contrast 12/14/23  Impression  Normal MRA of the head.     CT Head w/o Contrast  11/10/23  Impression  No acute intracranial abnormality.    CT Temporal w/o Contrast 11/02/23  Impression  1.  Normal temporal bone CT.    CT HEAD W/O CONTRAST 10/27/2023   Impression:  1. No acute intracranial pathology.  2. Osseous lesion within the right frontoethmoidal recess, possibly  representing a fibro-osseous lesion.    MR HEAD BRAIN WWO 08/10/2022  Impression  1. No significant intracranial pathology. In particular, no evidence of intracranial hemorrhage.    CT HEAD W/O CONTRAST 08/09/2022  Impression:   Subtle hyperdensities along the bifrontal cortical sulci. Findings may be artifactual or related to vasculature although subtle subarachnoid hemorrhage cannot be completely excluded. Consider short-term 4-6 hour follow-up CT.         Procedure Note: Diagnostic Nasal Endoscopy, CPT 25319  Date of Service: January 25, 2024  Provider: Faizan Yanez MD   Presumptive Diagnosis: No primary diagnosis found.  Anesthesia: Topical lidocaine and oxymetazoline via atomizer    Indication:  Evaluation of nasal/sinus complaints; inadequate visualization with anterior rhinoscopy    Description of procedure:  After obtaining consent for the procedure from the patient, the sinonasal cavity was sprayed with topical anesthetic. A rigid 30-degree nasal endoscope was used to first used to visualize the nasal floor and the nasopharynx on the left. A second pass was then made to visualize the middle meatus and sphenoethmoid recess. Finally, the scope was turned 90-degrees and used to visualize the olfactory cleft and frontal outflow tract. A similar approach was used for the contralateral side. She tolerated the procedure well without difficulty.    Endoscopic Findings:    Carissa-Sean Endoscopic Scoring System  Endoscopic observation Right Left   Polyps in middle meatus (0 = absent, 1 = restricted to middle meatus, 2 = Beyond middle meatus) 0 0   Discharge (0 = absent, 1 = thin and clear, 2 = thick, purulent) 0 0   Edema (0 =  absent, 1 = mild-moderate, 2 = moderate-severe) 0 0   Crusting (0 = absent, 1 = mild-moderate, 2 = moderate-severe) 0 0   Scarring (0= absent, 1 = mild-moderate, 2 = moderate-severe) 0 0   Total 0 0       Bilateral minimal sinonasal edema is noted without mucoid drainage.  No nasal polyposis or mucopurulent drainage is seen within the nasal cavity.  The middle meatus is clear without polyps.  The superior meatus is clear without polyps.  The sphenoethmoid recess and olfactory cleft are clear bilaterally.  No nasopharyngeal lesions are noted.  The eustachian tubes are patent and non-obstructed.      Final Assessment/Plan:  1.Start the patient on Flonase (twice a day two sprays) and continue Astelin. Discussed that these sprays are often synergistic when it comes to allergy control  2.Recommended he follow up with an allergist as he was previously established with one. Will send consult to local one  3.We are going to put in an order for a CT scan in the future, Discussed importance of determining chronic sinus symptoms vs. Allergic exacerbations. CT during symptoms will help with that  4. Had extensive discussion that the lesion in his frontal sinus is most likely not an osteoma and is most likely either fibrous dysplasia vs. A sinus that has auto-obliterated. Plan would be to follow with yearly CT for 4-5 years and if no growth would have very low concern.   5.Place in a referral to my Facial Pain colleagues. Patient has multiple areas of facial pain including retro-orbital pain, pain over the frontal sinus, and sharp pains in the cheeks and by the ears. CT does not demonstrate obvious source and do not believe his ossified frontal sinus explains these findings.    6.Follow up in 4 months with me       Scribe Disclosure:   I, Marge Alexander, am serving as a scribe; to document services personally performed by Faizan Yanez MD -based on data collection and the provider's statements to me.     Provider Disclosure:  I agree  with above History, Review of Systems, Physical exam and Plan.  I have reviewed the content of the documentation and have edited it as needed. I have personally performed the services documented here and the documentation accurately represents those services and the decisions I have made.      Electronically signed by:  Faizan Yanez MD    [facility]  Rhinology  Endoscopic Skull Base Surgery  [facility]  Department of Otolaryngology - Head & Neck Surgery

## 2024-02-17 ENCOUNTER — MYC REFILL (OUTPATIENT)
Dept: ENDOCRINOLOGY | Facility: CLINIC | Age: 29
End: 2024-02-17
Payer: COMMERCIAL

## 2024-02-17 DIAGNOSIS — E16.1 REACTIVE HYPOGLYCEMIA: ICD-10-CM

## 2024-02-20 ENCOUNTER — MYC REFILL (OUTPATIENT)
Dept: ENDOCRINOLOGY | Facility: CLINIC | Age: 29
End: 2024-02-20
Payer: COMMERCIAL

## 2024-02-20 ENCOUNTER — VIRTUAL VISIT (OUTPATIENT)
Dept: PSYCHOLOGY | Facility: CLINIC | Age: 29
End: 2024-02-20
Payer: COMMERCIAL

## 2024-02-20 DIAGNOSIS — F54 PSYCHOLOGICAL FACTORS AFFECTING MEDICAL CONDITION: ICD-10-CM

## 2024-02-20 DIAGNOSIS — M79.7 FIBROMYALGIA: ICD-10-CM

## 2024-02-20 DIAGNOSIS — G90.A POTS (POSTURAL ORTHOSTATIC TACHYCARDIA SYNDROME): ICD-10-CM

## 2024-02-20 DIAGNOSIS — G89.29 OTHER CHRONIC PAIN: ICD-10-CM

## 2024-02-20 DIAGNOSIS — M35.9 CONNECTIVE TISSUE DISORDER (H): ICD-10-CM

## 2024-02-20 DIAGNOSIS — J45.20 MILD INTERMITTENT ASTHMA WITHOUT COMPLICATION: ICD-10-CM

## 2024-02-20 DIAGNOSIS — E16.1 REACTIVE HYPOGLYCEMIA: ICD-10-CM

## 2024-02-20 DIAGNOSIS — F89 NEURODEVELOPMENTAL DISORDER: ICD-10-CM

## 2024-02-20 DIAGNOSIS — F44.5 PSYCHOGENIC NONEPILEPTIC SEIZURE: ICD-10-CM

## 2024-02-20 DIAGNOSIS — E16.1 HYPERINSULINEMIA: Primary | ICD-10-CM

## 2024-02-20 DIAGNOSIS — E16.2 HYPOGLYCEMIA: ICD-10-CM

## 2024-02-20 PROCEDURE — 96159 HLTH BHV IVNTJ INDIV EA ADDL: CPT | Mod: 95 | Performed by: PSYCHOLOGIST

## 2024-02-20 PROCEDURE — 96158 HLTH BHV IVNTJ INDIV 1ST 30: CPT | Mod: 95 | Performed by: PSYCHOLOGIST

## 2024-02-20 RX ORDER — BLOOD SUGAR DIAGNOSTIC
STRIP MISCELLANEOUS
Qty: 100 STRIP | Refills: 1 | Status: SHIPPED | OUTPATIENT
Start: 2024-02-20

## 2024-02-20 NOTE — PROGRESS NOTES
Health Psychology          Sonali Morris, Ph.D.,  [phone]  Anastasiya Pelayo, Ph.D.,  [phone]  Brandie Daniels, Ph.D.,  [phone]  Melody Lanza, Ph.D., , [facility] [phone]  Chin Mai, Ph.D., , [facility] [phone]  Isaura Hatch, Ph.D.,  [phone]  Carla Cunningham, Ph.D., , [facility] [phone]    [facility], 3rd Floor  [address] [place] [address]        Health Behavior Intervention    Length of Session: 58 minutes              Start Time: 2:01 PM              Stop Time: 2:59 PM       : ALBERTO  Type: Follow-up  Session #: 2 (new episode of care)  Referred by: Greta Cannon PA-C    Telemedicine Information    Telemedicine Visit: The patient's condition can be safely assessed and treated via synchronous audio and visual telemedicine encounter.    Reason for Telemedicine Visit: Patient has requested telehealth visit and Patient convenience (e.g. access to timely appointments / distance to available provider)  Originating Site (Patient Location): Patient's home, [place]  Distant Location (provider location): On-site: [facility]  Consent:  The patient/guardian has verbally consented to: the potential risks and benefits of telemedicine (video visit) versus in person care; bill my insurance or make self-payment for services provided; and responsibility for payment of non-covered services.   Mode of Communication:  Video Conference via [employer]  As the provider I attest to compliance with applicable laws and regulations related to telemedicine.     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 Was referred for health psychology services by endocrinology provider, Greta Cannon PA-C for assistance with stress in the context of multiple, chronic health conditions during a visit in which he was being evaluated for hypoglycemia.      Session Content:    Update: Patient reports doing okay. Has been working with his psychiatrist to optimize medication. Has stopped Zyprexa and is doing Abilify only at this time. Sleep hallucinations have worsened since discontinuing Zyprexa. Also having auditory hallucinations if very tired. No other psychotic symptoms while awake and alert. Patient also notes that they would like to pursue a formal evaluation for autism.      Promoting Functional Improvement, Minimizing Barriers, and Management/Coping with Medical Condition(s):     Reviewed goals for health behavior intervention. Discussed goal from last visit regarding getting a new CGM sensor placed. Patient was able to get a new CGM and noted that he has been monitoring his symptoms and how they correspond to his BG levels. He notes that his psychotic symptoms and psychogenic seizures are associated with hypoglycemia (e.g., BG in 60s). Patient also notes that his anxiety and panic is associated rapid increases in BG (e.g., spikes up into the 130s). Patient notes that his psychogenic seizures have improved and he has been able to intervene earlier to avoid lows.     Discussed pain concerns. He notes that pain located at the intersection of his shoulders, neck, and spine is the type of pain that triggers his psychogenic seizures. He has tried heat, positional adjustments, strategic pillow placement, and taking OTC ibuprofen and/or naproxen. Reports that daily baseline is a 3/10. When it rises to 7-8/10, the likelihood of a psychogenic seizure increases. He also notes that when the quality of the pain is a combination of  "electrical and heat (\"like an electrified soldering iron\") the likelihood of a psychogenic seizure increases as well. Discussed current pain and he notes his pain is currently a 4/10 and describes it as aching. Completed body scan meditation. Discussed insights and observations about body sensations, the quality of discomfort, and fluctuations in intensity. Patient noted that the awareness of their discomfort increased.         Top 6 values identified include: creativity, kindness, mindfulness, persistence, safety, and skillfulness. They note that creativity, kindness, and mindfulness are the values that he feels most distant from.    Patient Education:  Patient was taught cognitive and behavioral strategies to address lifestyle and behavioral factors associated with medical concerns.  Patient was educated about: health behavior change.    Patient was given handouts on: none today.    Treatment Goals & Progress:    Treatment Goals:   Identifying triggers for and preventing psychogenic seizures (triggers include hypoglycemia, loud noises, physical and emotional overstimulation, and pain)  Increase participation in meaningful and purposeful activities outside of the home  Increase independence  Improve dietary habits  Resume SNAP benefits  Make healthy choices at food shelves  Increase physical activity/movement  BG regulation   Progress: In progress    Mental Status Exam:  Appearance: Appropriate   Eye Contact: Good    Orientation: Yes, x4  Behavior/relationship to provider/demeanor: Cooperative, Engaged and Pleasant  Motor Activity: normal or unremarkable  Mood (subjective report): More stable  Affect (objective appearance): Appropriate/mood congruent   Speech Rate: Normal  Speech Volume: Normal  Speech Articulation: Normal  Speech Coherence: Normal  Speech Spontaneity: Normal  Thought Content: No active SI, plans, or intent, endorses depressive and anxious cognitions  Thought Process (associations): Logical, Linear " and Goal directed  Thought Process (rate): Normal  Abnormal Perception: None  Attention/Concentration: Normal  Memory: Appears grossly intact  Fund of Knowledge: Appears within normal limits  Abstraction:  Normal  Insight: Good  Judgment: Good    Diagnoses:    1. Hyperinsulinemia    2. Hypoglycemia    3. Fibromyalgia    4. Other chronic pain    5. Connective tissue disorder (H24)    6. POTS (postural orthostatic tachycardia syndrome)    7. Mild intermittent asthma without complication    8. Psychological factors affecting medical condition    9. Psychogenic nonepileptic seizure      Medications:    Current Outpatient Medications   Medication    ACCU-CHEK GUIDE test strip    albuterol (PROAIR HFA/PROVENTIL HFA/VENTOLIN HFA) 108 (90 Base) MCG/ACT inhaler    azelastine (ASTELIN) 0.1 % nasal spray    baclofen (LIORESAL) 10 MG tablet    buPROPion (WELLBUTRIN XL) 150 MG 24 hr tablet    calcipotriene (DOVONOX) 0.005 % external solution    cetirizine (ZYRTEC) 10 MG tablet    clindamycin-benzoyl peroxide (BENZACLIN) 1-5 % external gel    clotrimazole (LOTRIMIN) 1 % external cream    CONCERTA 54 MG CR tablet    Continuous Blood Gluc Sensor (FREESTYLE JOSSY 2 SENSOR) MISC    diazepam (VALIUM) 5 MG tablet    diphenhydrAMINE (BENADRYL) 25 MG capsule    EPINEPHrine (ANY BX GENERIC EQUIV) 0.3 MG/0.3ML injection 2-pack    estradiol cypionate (DEPO-ESTRADIOL) 5 MG/ML injection    famotidine (PEPCID) 20 MG tablet    fluticasone (FLONASE) 50 MCG/ACT nasal spray    gabapentin (NEURONTIN) 300 MG capsule    HYDROcodone-acetaminophen (NORCO) 5-325 MG tablet    ibuprofen (ADVIL/MOTRIN) 200 MG capsule    ketoconazole (NIZORAL) 2 % external shampoo    levETIRAcetam (KEPPRA) 500 MG tablet    loratadine (CLARITIN) 10 MG tablet    metFORMIN (GLUCOPHAGE) 500 MG tablet    Methylphenidate HCl (RITALIN PO)    methylphenidate HCl ER, OSM, (CONCERTA) 18 MG CR tablet    minoxidil (LONITEN) 2.5 MG tablet    Minoxidil 5 % FOAM    montelukast (SINGULAIR)  10 MG tablet    ondansetron (ZOFRAN ODT) 4 MG ODT tab    OTHER MEDICAL SUPPLIES    oxybutynin ER (DITROPAN XL) 10 MG 24 hr tablet    oxybutynin ER (DITROPAN XL) 5 MG 24 hr tablet    propranolol ER (INDERAL LA) 160 MG 24 hr capsule    Specialty Vitamins Products (VITAMINS FOR HAIR) CAPS    SUMAtriptan (IMITREX) 50 MG tablet    tacrolimus (PROTOPIC) 0.1 % external ointment    testosterone (ANDROGEL 1.62 % PUMP) 20.25 MG/ACT gel    tretinoin (RETIN-A) 0.05 % external cream    ubrogepant (UBRELVY) 50 MG tablet    venlafaxine (EFFEXOR-ER) 150 MG TB24 24 hr tablet    vitamin D3 (CHOLECALCIFEROL) 10 MCG (400 UNIT) capsule     Current Facility-Administered Medications   Medication    ciprofloxacin (CIPRO) tablet 500 mg       Plan:    Patient to follow-up on 3/26/24 at 2:00pm for continued health behavior intervention to address stress in the context of multiple medical concerns.  Primary care needs and medications are managed by Jessica Cardoso MD at [facility]. Referring provider is Greta Cannon PA-C.   Patient's primary mental health needs are addressed by a mental health team at [facility] (therapist is at this facility) and [facility] (psychiatry provider is at this facility).   Patient was seen for consultation by the HOPE Program at [facility].    Patient to contact [facility], [facility] or other community resources if there was a psychiatric emergency.  Next visit:   Focus on keeping a steady blood glucose   Practice body scan during the day 3x per week    Skills taught/interventions used thus far:  Psychoeducation  Values clarification  Motivational interviewing  Willingness and action plan  Mindfulness (breath, body scan)    Carla Cunningham, Ph.D., , [facility]  Clinical Health Psychologist  Phone: [phone]   Pager: [phone]

## 2024-03-26 ENCOUNTER — VIRTUAL VISIT (OUTPATIENT)
Dept: PSYCHOLOGY | Facility: CLINIC | Age: 29
End: 2024-03-26
Payer: COMMERCIAL

## 2024-03-26 DIAGNOSIS — G40.109 TEMPORAL LOBE EPILEPSY (H): ICD-10-CM

## 2024-03-26 DIAGNOSIS — G89.29 OTHER CHRONIC PAIN: ICD-10-CM

## 2024-03-26 DIAGNOSIS — M79.7 FIBROMYALGIA: ICD-10-CM

## 2024-03-26 DIAGNOSIS — E16.2 HYPOGLYCEMIA: ICD-10-CM

## 2024-03-26 DIAGNOSIS — F54 PSYCHOLOGICAL FACTORS AFFECTING MEDICAL CONDITION: ICD-10-CM

## 2024-03-26 DIAGNOSIS — J45.20 MILD INTERMITTENT ASTHMA WITHOUT COMPLICATION: ICD-10-CM

## 2024-03-26 DIAGNOSIS — G90.A POTS (POSTURAL ORTHOSTATIC TACHYCARDIA SYNDROME): ICD-10-CM

## 2024-03-26 DIAGNOSIS — M35.9 CONNECTIVE TISSUE DISORDER (H): ICD-10-CM

## 2024-03-26 DIAGNOSIS — E16.1 HYPERINSULINEMIA: Primary | ICD-10-CM

## 2024-03-26 PROCEDURE — 96158 HLTH BHV IVNTJ INDIV 1ST 30: CPT | Mod: 95 | Performed by: PSYCHOLOGIST

## 2024-03-26 NOTE — PROGRESS NOTES
Health Psychology          Sonali Morris, Ph.D.,  [phone]  Anastasiya Pelayo, Ph.D.,  [phone]  Brandie Daniels, Ph.D.,  [phone]  Melody Lanza, Ph.D., , [facility] [phone]  Chin Mai, Ph.D., , [facility] [phone]  Isaura Hatch, Ph.D.,  [phone]  Carla Cunningham, Ph.D., , [facility] [phone]    [facility] [place], 3rd Floor  [address] [place] [address]        Health Behavior Intervention    Length of Session: 25 minutes              Start Time: 2:04 PM              Stop Time: 2:29 PM        : ALBERTO  Type: Follow-up  Session #: 3 (new episode of care)  Referred by: Greta Cannon PA-C    Telemedicine Information    Telemedicine Visit: The patient's condition can be safely assessed and treated via synchronous audio and visual telemedicine encounter.    Reason for Telemedicine Visit: Patient has requested telehealth visit and Patient convenience (e.g. access to timely appointments / distance to available provider)  Originating Site (Patient Location): Patient's home, [place]  Distant Location (provider location): Off-site: Provider's Home Office  Consent:  The patient/guardian has verbally consented to: the potential risks and benefits of telemedicine (video visit) versus in person care; bill my insurance or make self-payment for services provided; and responsibility for payment of non-covered services.   Mode of Communication:  Video Conference via [employer]  As the provider I attest to compliance with applicable laws and regulations related to telemedicine.     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 Was referred for health psychology services by endocrinology provider, Greta Cannon PA-C for assistance with stress in the context of multiple, chronic health conditions during a visit in which he was being evaluated for hypoglycemia.      Session Content:    Update: Patient reports doing generally well. Reports that he had a follow-up with his neurologist (at [facility]) and has been diagnosed with atypical temporal lobe epilepsy. May also be having psychogenic seizures as well. Trial of Keppra was started in December, which also helped with his psychotic symptoms. Reports that the psychotic symptoms have been a function of his seizures. Reports that headaches and fatigue have improved as well. Notes that he is feeling better about having some answers but confused about next steps. Will be getting set up with a PCA and also got set up with [employer].      Promoting Functional Improvement, Minimizing Barriers, and Management/Coping with Medical Condition(s):     Reviewed goals for health behavior intervention. Discussed goal from last visit regarding keeping BG stable. Patient notes that he has been keeping his BG in the 80s-90s. Has also been working on improving medication adherence by taking his medications consistently at the same time daily. Also reports that he did not practice the body scan, but did brief mental \"check-ins\" regarding pain. Discussed insights. He notes that these check-in helped him localize his pain and intervene more effectively.      Discussed pain concerns. Patient reports that his pain has been stable at 3-5/10. Notes that he has been trying to be more active and increase his engagement. Notes that he has been going to [employer] [facility] [employer].     Discussed next steps for health psychology intervention. Mutually agreed to focus on maintaining improvements and checking-in in 1 month " to touch base about next steps regarding epilepsy and supporting patient with making adjustments necessary to manage this new diagnosis.          Top 6 values identified include: creativity, kindness, mindfulness, persistence, safety, and skillfulness. They note that creativity, kindness, and mindfulness are the values that he feels most distant from.    Patient Education:  Patient was taught cognitive and behavioral strategies to address lifestyle and behavioral factors associated with medical concerns.  Patient was educated about: health behavior change.    Patient was given handouts on: none today.    Treatment Goals & Progress:    Treatment Goals:   Identifying triggers for and preventing psychogenic seizures (triggers include hypoglycemia, loud noises, physical and emotional overstimulation, and pain)  Increase participation in meaningful and purposeful activities outside of the home  Increase independence  Improve dietary habits  Resume SNAP benefits  Make healthy choices at food shelves  Increase physical activity/movement  BG regulation   Progress: In progress    Mental Status Exam:  Appearance: Appropriate   Eye Contact: Good    Orientation: Yes, x4  Behavior/relationship to provider/demeanor: Cooperative, Engaged and Pleasant  Motor Activity: normal or unremarkable  Mood (subjective report): More stable  Affect (objective appearance): Appropriate/mood congruent   Speech Rate: Normal  Speech Volume: Normal  Speech Articulation: Normal  Speech Coherence: Normal  Speech Spontaneity: Normal  Thought Content: No active SI, plans, or intent, endorses depressive and anxious cognitions  Thought Process (associations): Logical, Linear and Goal directed  Thought Process (rate): Normal  Abnormal Perception: None  Attention/Concentration: Normal  Memory: Appears grossly intact  Fund of Knowledge: Appears within normal limits  Abstraction:  Normal  Insight: Good  Judgment: Good    Diagnoses:    1. Hyperinsulinemia     2. Hypoglycemia    3. Fibromyalgia    4. Other chronic pain    5. Connective tissue disorder (H24)    6. POTS (postural orthostatic tachycardia syndrome)    7. Mild intermittent asthma without complication    8. Psychological factors affecting medical condition    9. Temporal lobe epilepsy (H)        Medications:    Current Outpatient Medications   Medication    ACCU-CHEK GUIDE test strip    albuterol (PROAIR HFA/PROVENTIL HFA/VENTOLIN HFA) 108 (90 Base) MCG/ACT inhaler    azelastine (ASTELIN) 0.1 % nasal spray    baclofen (LIORESAL) 10 MG tablet    buPROPion (WELLBUTRIN XL) 150 MG 24 hr tablet    calcipotriene (DOVONOX) 0.005 % external solution    cetirizine (ZYRTEC) 10 MG tablet    clindamycin-benzoyl peroxide (BENZACLIN) 1-5 % external gel    clotrimazole (LOTRIMIN) 1 % external cream    CONCERTA 54 MG CR tablet    Continuous Blood Gluc Sensor (FREESTYLE JOSSY 2 SENSOR) MISC    diazepam (VALIUM) 5 MG tablet    diphenhydrAMINE (BENADRYL) 25 MG capsule    EPINEPHrine (ANY BX GENERIC EQUIV) 0.3 MG/0.3ML injection 2-pack    estradiol cypionate (DEPO-ESTRADIOL) 5 MG/ML injection    famotidine (PEPCID) 20 MG tablet    fluticasone (FLONASE) 50 MCG/ACT nasal spray    gabapentin (NEURONTIN) 300 MG capsule    HYDROcodone-acetaminophen (NORCO) 5-325 MG tablet    ibuprofen (ADVIL/MOTRIN) 200 MG capsule    ketoconazole (NIZORAL) 2 % external shampoo    levETIRAcetam (KEPPRA) 500 MG tablet    loratadine (CLARITIN) 10 MG tablet    metFORMIN (GLUCOPHAGE) 500 MG tablet    Methylphenidate HCl (RITALIN PO)    methylphenidate HCl ER, OSM, (CONCERTA) 18 MG CR tablet    minoxidil (LONITEN) 2.5 MG tablet    Minoxidil 5 % FOAM    montelukast (SINGULAIR) 10 MG tablet    ondansetron (ZOFRAN ODT) 4 MG ODT tab    OTHER MEDICAL SUPPLIES    oxybutynin ER (DITROPAN XL) 10 MG 24 hr tablet    oxybutynin ER (DITROPAN XL) 5 MG 24 hr tablet    propranolol ER (INDERAL LA) 160 MG 24 hr capsule    Specialty Vitamins Products (VITAMINS FOR HAIR)  CAPS    SUMAtriptan (IMITREX) 50 MG tablet    tacrolimus (PROTOPIC) 0.1 % external ointment    testosterone (ANDROGEL 1.62 % PUMP) 20.25 MG/ACT gel    tretinoin (RETIN-A) 0.05 % external cream    ubrogepant (UBRELVY) 50 MG tablet    venlafaxine (EFFEXOR-ER) 150 MG TB24 24 hr tablet    vitamin D3 (CHOLECALCIFEROL) 10 MCG (400 UNIT) capsule     Current Facility-Administered Medications   Medication    ciprofloxacin (CIPRO) tablet 500 mg       Plan:    Patient to follow-up on 4/29/24 at 2:00pm for continued health behavior intervention to address stress in the context of multiple medical concerns.  Primary care needs and medications are managed by Jessica Cardoso MD at [facility]. Referring provider is Greta Cannon PA-C.   Patient's primary mental health needs are addressed by a mental health team at [facility] (therapist is at this facility) and [facility] (psychiatry provider is at this facility).   Patient was seen for consultation by the HOPE Program at [facility].    Patient to contact [facility], [facility] or other community resources if there was a psychiatric emergency.  Next visit:   Continue to focus on keeping a steady blood glucose   Explore ways of expanding art and creativity    Skills taught/interventions used thus far:  Psychoeducation  Values clarification  Motivational interviewing  Willingness and action plan  Mindfulness (breath, body scan)    Carla Cunningham, Ph.D., , [facility]  Clinical Health Psychologist  Phone: [phone]   Pager: [phone]

## 2024-04-29 ENCOUNTER — VIRTUAL VISIT (OUTPATIENT)
Dept: PSYCHOLOGY | Facility: CLINIC | Age: 29
End: 2024-04-29
Payer: COMMERCIAL

## 2024-04-29 DIAGNOSIS — M35.9 CONNECTIVE TISSUE DISORDER (H): ICD-10-CM

## 2024-04-29 DIAGNOSIS — G90.A POTS (POSTURAL ORTHOSTATIC TACHYCARDIA SYNDROME): ICD-10-CM

## 2024-04-29 DIAGNOSIS — E16.2 HYPOGLYCEMIA: ICD-10-CM

## 2024-04-29 DIAGNOSIS — F44.5 PSYCHOGENIC NONEPILEPTIC SEIZURE: ICD-10-CM

## 2024-04-29 DIAGNOSIS — G40.109 TEMPORAL LOBE EPILEPSY (H): ICD-10-CM

## 2024-04-29 DIAGNOSIS — J45.20 MILD INTERMITTENT ASTHMA WITHOUT COMPLICATION: ICD-10-CM

## 2024-04-29 DIAGNOSIS — F54 PSYCHOLOGICAL FACTORS AFFECTING MEDICAL CONDITION: ICD-10-CM

## 2024-04-29 DIAGNOSIS — M79.7 FIBROMYALGIA: ICD-10-CM

## 2024-04-29 DIAGNOSIS — E16.1 HYPERINSULINEMIA: Primary | ICD-10-CM

## 2024-04-29 DIAGNOSIS — G89.29 OTHER CHRONIC PAIN: ICD-10-CM

## 2024-04-29 PROCEDURE — 96159 HLTH BHV IVNTJ INDIV EA ADDL: CPT | Mod: 95 | Performed by: PSYCHOLOGIST

## 2024-04-29 PROCEDURE — 96158 HLTH BHV IVNTJ INDIV 1ST 30: CPT | Mod: 95 | Performed by: PSYCHOLOGIST

## 2024-04-29 NOTE — Clinical Note
Francisco J Hernandes,   Is this patient eligible for an RN visit for support with getting his CGM sensor placed through the endocrinology clinic? He said that he didn't place it correctly and bent the filament with his first one. I said I would reach out to see if he could get an RN visit for support with this.   Thanks in advance!  Carla Cunningham, Ph.D., , [facility] Clinical Health Psychologist Phone: [phone]  4/29/2024 5:57 PM

## 2024-04-29 NOTE — PROGRESS NOTES
Health Psychology          Sonali Morris, Ph.D.,  [phone]  Anastasiya Pelayo, Ph.D.,  [phone]  Brandie Daniels, Ph.D.,  [phone]  Melody Lanza, Ph.D., , [facility] [phone]  Chin Mai, Ph.D., , [facility] [phone]  Isaura Hatch, Ph.D.,  [phone]  Carla Cunningham, Ph.D., , [facility] [phone]    [facility], 3rd Floor  [address] [place] [address]        Health Behavior Intervention    Length of Session: 44 minutes              Start Time: 2:01 PM               Stop Time: 2:45 PM         : ALBERTO  Type: Follow-up  Session #: 4 (new episode of care)  Referred by: Greta Cannon PA-C    Telemedicine Information    Telemedicine Visit: The patient's condition can be safely assessed and treated via synchronous audio and visual telemedicine encounter.    Reason for Telemedicine Visit: Patient has requested telehealth visit and Patient convenience (e.g. access to timely appointments / distance to available provider)  Originating Site (Patient Location): Patient's home, [place]  Distant Location (provider location): Off-site: Provider's Home Office  Consent:  The patient/guardian has verbally consented to: the potential risks and benefits of telemedicine (video visit) versus in person care; bill my insurance or make self-payment for services provided; and responsibility for payment of non-covered services.   Mode of Communication:  Video Conference via [employer]  As the provider I attest to compliance with applicable laws and regulations related to telemedicine.     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 Was referred for health psychology services by endocrinology provider, Greta Cannon PA-C for assistance with stress in the context of multiple, chronic health conditions during a visit in which he was being evaluated for hypoglycemia.      Session Content:    Update: Patient reports that he visited his mother and stepfather in [place], [place] at the beginning of the month. States that he learned that several of his previous pets had passed away and his parents withheld this information from him. Patient reports that this was very difficult for him and that it felt like it was a betrayal. Reports that he is disengaging from the relationship with his parents, particularly his mother. Discussed the impact that this stress has had on his health. Reports that he has been having breakthrough seizure-related psychotic symptoms and that his Keppra is wearing off about an hour early. Called neurologist to notify him and will be following up to discuss medication adjustments. Patient also states that his SSI has been approved and will be receiving payments soon. Reports that headaches and fatigue have remained improved as well. Has gotten set up with a PCA and with [employer].      Promoting Functional Improvement, Minimizing Barriers, and Management/Coping with Medical Condition(s):     Reviewed goals for health behavior intervention. Discussed goal from last visit regarding keeping BG stable. Patient notes that he has been keeping his BG in the high 90's to 100 (per glucometer). Has not been able to use CGM because he reports that he bent the filament when trying to place it. Will reach out to endocrinology team to see if patient can schedule an RN visit for assistance with CGM sensor placement. Reports that his most recent epileptic seizure was April 20th. Reports that the frequency of his seizures has decreased. Also now  "has a medical bracelet that is engraved with his name and condition. Has an emergency medical kit with an emergency supply of Keppra in it.    Notes that he has been continuing to go to [employer]uklik at the [employer]. Has also been focusing on expanding his creative activities. Will find out this week if his art will be able to be on display for purchase at the [employer].        Discussed pain concerns. Patient reports that his pain has remained stable and currently rates it at 4-5/10. Reports that his sleep has been disrupted in the past 2 nights, which has corresponded to an increase in pain. Notes that he has continued to remain more active and increase his engagement. Also reports that he continued to complete his mental \"check-ins\" regarding pain. He notes that these check-ins continue to help him localize his pain and intervene more effectively.       Assisted patient in creating goals around maintaining improvements in his BG regulation and health. Patient will focus on CGM sensor placement and getting out of the house for physical activity 1-2x per week.          Top 6 values identified include: creativity, kindness, mindfulness, persistence, safety, and skillfulness. They note that creativity, kindness, and mindfulness are the values that he feels most distant from.    Patient Education:  Patient was taught cognitive and behavioral strategies to address lifestyle and behavioral factors associated with medical concerns.  Patient was educated about: health behavior change.    Patient was given handouts on: none today.    Treatment Goals & Progress:    Treatment Goals:   Identifying triggers for and preventing psychogenic seizures (triggers include hypoglycemia, loud noises, physical and emotional overstimulation, and pain)  Increase participation in meaningful and purposeful activities outside of the home  Increase independence  Improve dietary habits  Resume SNAP benefits  Make healthy choices at food " shelves  Increase physical activity/movement  BG regulation   Progress: In progress    Mental Status Exam:  Appearance: Appropriate   Eye Contact: Good    Orientation: Yes, x4  Behavior/relationship to provider/demeanor: Cooperative, Engaged and Pleasant  Motor Activity: normal or unremarkable  Mood (subjective report): More stable  Affect (objective appearance): Appropriate/mood congruent   Speech Rate: Normal  Speech Volume: Normal  Speech Articulation: Normal  Speech Coherence: Normal  Speech Spontaneity: Normal  Thought Content: No active SI, plans, or intent, endorses depressive and anxious cognitions  Thought Process (associations): Logical, Linear and Goal directed  Thought Process (rate): Normal  Abnormal Perception: None  Attention/Concentration: Normal  Memory: Appears grossly intact  Fund of Knowledge: Appears within normal limits  Abstraction:  Normal  Insight: Good  Judgment: Good    Diagnoses:    1. Hyperinsulinemia    2. Hypoglycemia    3. Fibromyalgia    4. Other chronic pain    5. Connective tissue disorder (H24)    6. POTS (postural orthostatic tachycardia syndrome)    7. Mild intermittent asthma without complication    8. Psychological factors affecting medical condition    9. Temporal lobe epilepsy (H)    10. Psychogenic nonepileptic seizure        Medications:    Current Outpatient Medications   Medication Sig Dispense Refill    ACCU-CHEK GUIDE test strip TEST ONCE DAILY OR WITH SYMPTOMS. Follow up visit needed. Call  to schedule. 100 strip 1    albuterol (PROAIR HFA/PROVENTIL HFA/VENTOLIN HFA) 108 (90 Base) MCG/ACT inhaler Inhale 2 puffs into the lungs as needed      azelastine (ASTELIN) 0.1 % nasal spray USE 1 TO 2 SPRAYS IN EACH NOSTRIL 1 TO 2 TIMES DAILY AS NEEDED      baclofen (LIORESAL) 10 MG tablet TAKE 1 TO 2 TABLETS BY MOUTH EVERY NIGHT      buPROPion (WELLBUTRIN XL) 150 MG 24 hr tablet Take 150 mg by mouth every morning      calcipotriene (DOVONOX) 0.005 % external solution  Apply 10-15 drops to scalp at nighttime before bed 60 mL 11    cetirizine (ZYRTEC) 10 MG tablet Take 10 mg by mouth daily Pt takes at HS      clindamycin-benzoyl peroxide (BENZACLIN) 1-5 % external gel as needed      clotrimazole (LOTRIMIN) 1 % external cream as needed      CONCERTA 54 MG CR tablet  (Patient not taking: Reported on 1/25/2024)      Continuous Blood Gluc Sensor (FREESTYLE JOSSY 2 SENSOR) MISC Change SENSOR EVERY 14 DAYS. Follow up visit needed. Call  to schedule. 2 each 3    diazepam (VALIUM) 5 MG tablet VAGINAL VALIUM SUPPOSITORY 5MG AT NIGHT AND PRIOR TO THERAPY AS NEEDED (Patient taking differently: as needed VAGINAL VALIUM SUPPOSITORY 5MG AT NIGHT AND PRIOR TO THERAPY AS NEEDED) 40 tablet 3    diphenhydrAMINE (BENADRYL) 25 MG capsule Take 25 mg by mouth as needed      EPINEPHrine (ANY BX GENERIC EQUIV) 0.3 MG/0.3ML injection 2-pack Inject into the lateral thigh as needed for life threatening allergic reactions.      estradiol cypionate (DEPO-ESTRADIOL) 5 MG/ML injection Inject into the muscle every 3 months      famotidine (PEPCID) 20 MG tablet Take 20 mg by mouth 2 times daily      fluticasone (FLONASE) 50 MCG/ACT nasal spray Spray 2 sprays into both nostrils 2 times daily 16 g 11    gabapentin (NEURONTIN) 300 MG capsule Take 300-600 mg by mouth 3 times daily as needed 900 mg at night      HYDROcodone-acetaminophen (NORCO) 5-325 MG tablet Take 1 tablet by mouth every 6 hours as needed for severe pain      ibuprofen (ADVIL/MOTRIN) 200 MG capsule Take by mouth as needed      ketoconazole (NIZORAL) 2 % external shampoo APPLY TOPICALLY EVERY OTHER DAY TO WET SCALP AND BACK AND LET SIT FOR 3 TO5 MINUTES 120 mL 8    levETIRAcetam (KEPPRA) 500 MG tablet Take 500 mg by mouth 2 times daily      loratadine (CLARITIN) 10 MG tablet Take 10 mg by mouth every morning      metFORMIN (GLUCOPHAGE) 500 MG tablet Take 1 tablet (500 mg) by mouth daily (with breakfast) 90 tablet 3    Methylphenidate HCl  (RITALIN PO) Take 36 mg by mouth 3 times daily (with meals)      methylphenidate HCl ER, OSM, (CONCERTA) 18 MG CR tablet Take 18 mg by mouth      minoxidil (LONITEN) 2.5 MG tablet Take 1/2 tablet (1.25 mg) in the morning once daily. (Patient not taking: Reported on 4/3/2023) 45 tablet 3    Minoxidil 5 % FOAM Apply thin layer twice daily to scalp 60 g 11    montelukast (SINGULAIR) 10 MG tablet Take 10 mg by mouth At Bedtime      ondansetron (ZOFRAN ODT) 4 MG ODT tab Take 1 tablet (4 mg) by mouth every 8 hours as needed for nausea 12 tablet 0    OTHER MEDICAL SUPPLIES       oxybutynin ER (DITROPAN XL) 10 MG 24 hr tablet Take 1 tablet (10 mg) by mouth daily 30 tablet 11    oxybutynin ER (DITROPAN XL) 5 MG 24 hr tablet Take 1 tablet (5 mg) by mouth daily 90 tablet 2    propranolol ER (INDERAL LA) 160 MG 24 hr capsule Take 160 mg by mouth At Bedtime      Specialty Vitamins Products (VITAMINS FOR HAIR) CAPS Take 1 tablet by mouth daily (Patient not taking: Reported on 4/3/2023)      SUMAtriptan (IMITREX) 50 MG tablet Take 50 mg by mouth at onset of headache (Patient not taking: Reported on 3/23/2023)      tacrolimus (PROTOPIC) 0.1 % external ointment Apply topically 2 times daily To rashes on the hands as needed. 60 g 3    testosterone (ANDROGEL 1.62 % PUMP) 20.25 MG/ACT gel Place 2 Pump onto the skin every morning      tretinoin (RETIN-A) 0.05 % external cream Apply topically At Bedtime To the back every other night (increase to nightly if tolerated). Apply a moisturizer on top. 45 g 11    ubrogepant (UBRELVY) 50 MG tablet Take 50 mg by mouth at onset of headache PRN      venlafaxine (EFFEXOR-ER) 150 MG TB24 24 hr tablet Take 300 mg by mouth every morning      vitamin D3 (CHOLECALCIFEROL) 10 MCG (400 UNIT) capsule Take 2 capsules (800 Units) by mouth daily (Patient not taking: Reported on 5/9/2023) 60 capsule 11     Current Facility-Administered Medications   Medication Dose Route Frequency Provider Last Rate Last Admin     ciprofloxacin (CIPRO) tablet 500 mg  500 mg Oral Once Arden Mercedes MD           Plan:    Patient to follow-up on 5/28/24 at 3:00pm for continued health behavior intervention to address stress in the context of multiple medical concerns.  Primary care needs and medications are managed by Jessica Cardoso MD at [facility]. Referring provider is Greta Cannon PA-C.   Patient's primary mental health needs are addressed by a mental health team at [facility] (therapist is at this facility) and [facility] (psychiatry provider is at this facility).   Patient was seen for consultation by the [facility] at [facility].    Patient to contact [facility], [facility] or other community resources if there was a psychiatric emergency.  Next visit:   Continue to focus on keeping a steady blood glucose. Will reach out to endocrinology team to see if patient can have an RN visit for CGM sensor placement  Get out of the house and increase movement 1-2x per week  Continue to explore ways of expanding art and creativity.    Skills taught/interventions used thus far:  Psychoeducation  Values clarification  Motivational interviewing  Willingness and action plan  Mindfulness (breath, body scan)  Health behavior change (BG management)    Carla Cunningham, Ph.D., , ABPP  Clinical Health Psychologist  Phone: [phone]   Pager: [phone]

## 2024-05-15 ENCOUNTER — VIRTUAL VISIT (OUTPATIENT)
Dept: DERMATOLOGY | Facility: CLINIC | Age: 29
End: 2024-05-15
Payer: COMMERCIAL

## 2024-05-15 DIAGNOSIS — L70.0 ACNE VULGARIS: Primary | ICD-10-CM

## 2024-05-15 PROCEDURE — 99442 PR PHYSICIAN TELEPHONE EVALUATION 11-20 MIN: CPT | Mod: 93 | Performed by: PHYSICIAN ASSISTANT

## 2024-05-15 ASSESSMENT — PAIN SCALES - GENERAL: PAINLEVEL: NO PAIN (0)

## 2024-05-15 NOTE — LETTER
5/15/2024       RE: Gregg Maharaj  [facility]  [address]  [place] [address]     Dear Colleague,    Thank you for referring your patient, Gregg Maharaj, to the [facility] [place] at [facility]. Please see a copy of my visit note below.    [facility] Dermatology Note  Encounter Date:    05/15/2024    [employer]-and[employer]Forward and Telephone [phone]. Location of teledermatologist: [facility].  Start time: 12:40. End time: 12:51. (11 minutes)     Dermatology Problem List:  1. Nonscarring alopecia consistent with Telogen effluvium   - Current tx: none   - Prior offered: Rogaine 5% foam (not covered by insurance), minoxidil 1.25 mg (low bp exacerbating POTS)   2. Folliculitis  - ketoconazole 2% shampoo, tretinoin cream  3. Irritant hand dermatitis  - Current tx: tacrolimus ointment  - Prior tx: triamcinolone 0.1% ointment bid  4. Referred to genetics- family hx of EDS  5. Hyperhidrosis   - Current tx: oxybutynin 10 mg      ____________________________________________     Assessment & Plan:      #Acne vulgaris/ folliculitis, back.  Mild improvement with ketoconazole shampoo and tretinoin cream but not controlled. Bothersome.  Previously discussed Accutane with Dr Jackson. Recommended to wait until after transitioning surgeries performed. Had a total hysterectomy and salpingectomy 7/26/23.  Pt would like to move forward with Accutane.    I think Gregg would respond well to isotretinoin.  We discussed at length about how isotretinoin is a know teratogen.  We discussed that there have been reports of depression with suicide in patients on isotretinoin and that there have been reports of an increased risk of IBD on isotretinoin although several meta analyses have contradicted this.  We discussed risk of increased liver markers and lipids. We discussed expectations of dry skin, lips and eyes. He will  not share the medication or donate blood while on isotretinoin.     He will come in to sign consent forms  for the iPledge paperwork. He will have fasting lipids as well.   Plan to prescribed 40 mg of isotretinoin daily with food after labs and consents.      Procedures Performed:    None     Follow-up: 1 month(s) virtually or in person or earlier for new or changing lesions     Staff and Scribe:   Scribe Disclosure:   By signing my name below, I, Addie Palmer, attest that this documentation has been prepared under the direction and in the presence of Dr. Monserrat Celeste PA-C.  - Electronically Signed: Addie Palmer 05/15/24       Provider Disclosure:  I agree with above History, Review of Systems, Physical exam and Plan.  I have reviewed the content of the documentation and have edited it as needed. I have personally performed the services documented here and the documentation accurately represents those services and the decisions I have made.      Electronically signed by:  All risks, benefits and alternatives were discussed with patient.  Patient is in agreement and understands the assessment and plan.  All questions were answered.  Sun Screen Education was given.   Return to Clinic in 1 month or sooner as needed.   Monserrat Celeste PA-C      ____________________________________________     CC: Acne (Back acne.  Some improvement with Ketoconazole.  Would like to discuss Accutane (has never been on it before) )        HPI:  Gregg Maharaj is a(n) 28 year old transgender male who presents today as a return patient for acne. Last seen by Dr Jackson when he was prescribed oxybutin 10 mg, continued on ketoconazole shampoo for seb derm and folliculitis and acne. Also continued on tretinoin.    I spoke with Gregg. Acne is still present and would like to consider Accutane. Pt had a total hysterectomy and salpingectomy 7/26/23. Was also recently diagnosed with Epilepsy and is on Keppra with significantly improved  symptoms.      Patient is otherwise feeling well, without additional skin concerns.     Labs Reviewed:  CBC, CMP 3/14/24     Physical Exam:  Vitals: No vitals taken due to Virtual/Phone Visit  No photos available.        Medications:  Current Outpatient Medications   Medication Sig Dispense Refill    ACCU-CHEK GUIDE test strip TEST ONCE DAILY OR WITH SYMPTOMS. Follow up visit needed. Call  to schedule. 100 strip 1    albuterol (PROAIR HFA/PROVENTIL HFA/VENTOLIN HFA) 108 (90 Base) MCG/ACT inhaler Inhale 2 puffs into the lungs as needed      azelastine (ASTELIN) 0.1 % nasal spray USE 1 TO 2 SPRAYS IN EACH NOSTRIL 1 TO 2 TIMES DAILY AS NEEDED      baclofen (LIORESAL) 10 MG tablet TAKE 1 TO 2 TABLETS BY MOUTH EVERY NIGHT      buPROPion (WELLBUTRIN XL) 150 MG 24 hr tablet Take 150 mg by mouth every morning      calcipotriene (DOVONOX) 0.005 % external solution Apply 10-15 drops to scalp at nighttime before bed 60 mL 11    cetirizine (ZYRTEC) 10 MG tablet Take 10 mg by mouth daily Pt takes at HS      clindamycin-benzoyl peroxide (BENZACLIN) 1-5 % external gel as needed      clotrimazole (LOTRIMIN) 1 % external cream as needed      CONCERTA 54 MG CR tablet       Continuous Blood Gluc Sensor (FREESTYLE JOSSY 2 SENSOR) MISC Change SENSOR EVERY 14 DAYS. Follow up visit needed. Call  to schedule. 2 each 3    diazepam (VALIUM) 5 MG tablet VAGINAL VALIUM SUPPOSITORY 5MG AT NIGHT AND PRIOR TO THERAPY AS NEEDED (Patient taking differently: as needed VAGINAL VALIUM SUPPOSITORY 5MG AT NIGHT AND PRIOR TO THERAPY AS NEEDED) 40 tablet 3    diphenhydrAMINE (BENADRYL) 25 MG capsule Take 25 mg by mouth as needed      EPINEPHrine (ANY BX GENERIC EQUIV) 0.3 MG/0.3ML injection 2-pack Inject into the lateral thigh as needed for life threatening allergic reactions.      estradiol cypionate (DEPO-ESTRADIOL) 5 MG/ML injection Inject into the muscle every 3 months      famotidine (PEPCID) 20 MG tablet Take 20 mg by mouth 2  times daily      fluticasone (FLONASE) 50 MCG/ACT nasal spray Spray 2 sprays into both nostrils 2 times daily 16 g 11    gabapentin (NEURONTIN) 300 MG capsule Take 300-600 mg by mouth 3 times daily as needed 900 mg at night      HYDROcodone-acetaminophen (NORCO) 5-325 MG tablet Take 1 tablet by mouth every 6 hours as needed for severe pain      ibuprofen (ADVIL/MOTRIN) 200 MG capsule Take by mouth as needed      ketoconazole (NIZORAL) 2 % external shampoo APPLY TOPICALLY EVERY OTHER DAY TO WET SCALP AND BACK AND LET SIT FOR 3 TO5 MINUTES 120 mL 8    levETIRAcetam (KEPPRA) 500 MG tablet Take 500 mg by mouth 2 times daily      loratadine (CLARITIN) 10 MG tablet Take 10 mg by mouth every morning      metFORMIN (GLUCOPHAGE) 500 MG tablet Take 1 tablet (500 mg) by mouth daily (with breakfast) 90 tablet 3    Methylphenidate HCl (RITALIN PO) Take 36 mg by mouth 3 times daily (with meals)      methylphenidate HCl ER, OSM, (CONCERTA) 18 MG CR tablet Take 18 mg by mouth      minoxidil (LONITEN) 2.5 MG tablet Take 1/2 tablet (1.25 mg) in the morning once daily. 45 tablet 3    Minoxidil 5 % FOAM Apply thin layer twice daily to scalp 60 g 11    montelukast (SINGULAIR) 10 MG tablet Take 10 mg by mouth At Bedtime      ondansetron (ZOFRAN ODT) 4 MG ODT tab Take 1 tablet (4 mg) by mouth every 8 hours as needed for nausea 12 tablet 0    OTHER MEDICAL SUPPLIES       oxybutynin ER (DITROPAN XL) 10 MG 24 hr tablet Take 1 tablet (10 mg) by mouth daily 30 tablet 11    oxybutynin ER (DITROPAN XL) 5 MG 24 hr tablet Take 1 tablet (5 mg) by mouth daily 90 tablet 2    propranolol ER (INDERAL LA) 160 MG 24 hr capsule Take 160 mg by mouth At Bedtime      Specialty Vitamins Products (VITAMINS FOR HAIR) CAPS Take 1 tablet by mouth daily      SUMAtriptan (IMITREX) 50 MG tablet Take 50 mg by mouth at onset of headache      tacrolimus (PROTOPIC) 0.1 % external ointment Apply topically 2 times daily To rashes on the hands as needed. 60 g 3     testosterone (ANDROGEL 1.62 % PUMP) 20.25 MG/ACT gel Place 2 Pump onto the skin every morning      tretinoin (RETIN-A) 0.05 % external cream Apply topically At Bedtime To the back every other night (increase to nightly if tolerated). Apply a moisturizer on top. 45 g 11    ubrogepant (UBRELVY) 50 MG tablet Take 50 mg by mouth at onset of headache PRN      venlafaxine (EFFEXOR-ER) 150 MG TB24 24 hr tablet Take 300 mg by mouth every morning      vitamin D3 (CHOLECALCIFEROL) 10 MCG (400 UNIT) capsule Take 2 capsules (800 Units) by mouth daily 60 capsule 11     Current Facility-Administered Medications   Medication Dose Route Frequency Provider Last Rate Last Admin    ciprofloxacin (CIPRO) tablet 500 mg  500 mg Oral Once Arden Mercedes MD                       Past Medical/Surgical History:   Past Medical History:   Diagnosis Date    ADHD (attention deficit hyperactivity disorder)     VERNON positive     Anxiety     Asthma     Borderline personality disorder (H)     Concussion     Depression     Fibromyalgia     Gastroesophageal reflux disease with esophagitis     Gender dysphoria in adolescent and adult     Hypermobility of joint     Hypersomnia     Migraine     Mucous retention cyst of maxillary sinus     Obesity     PTSD (post-traumatic stress disorder)     Seasonal allergic rhinitis     Urinary frequency

## 2024-05-15 NOTE — NURSING NOTE
Dermatology Rooming Note    Gregg Maharaj's goals for this visit include:   Chief Complaint   Patient presents with    Acne     Back acne.  Some improvement with Ketoconazole.  Would like to discuss Accutane (has never been on it before)      Micaela Pierce CMA

## 2024-05-15 NOTE — PROGRESS NOTES
[facility] Dermatology Note  Encounter Date:    05/15/2024    Store-and-Forward and Telephone [phone]. Location of teledermatologist: [facility].  Start time: 12:40. End time: 12:51. (11 minutes)     Dermatology Problem List:  1. Nonscarring alopecia consistent with Telogen effluvium   - Current tx: none   - Prior offered: Rogaine 5% foam (not covered by insurance), minoxidil 1.25 mg (low bp exacerbating POTS)   2. Folliculitis  - ketoconazole 2% shampoo, tretinoin cream  3. Irritant hand dermatitis  - Current tx: tacrolimus ointment  - Prior tx: triamcinolone 0.1% ointment bid  4. Referred to genetics- family hx of EDS  5. Hyperhidrosis   - Current tx: oxybutynin 10 mg      ____________________________________________     Assessment & Plan:      #Acne vulgaris/ folliculitis, back.  Mild improvement with ketoconazole shampoo and tretinoin cream but not controlled. Bothersome.  Previously discussed Accutane with Dr Jackson. Recommended to wait until after transitioning surgeries performed. Had a total hysterectomy and salpingectomy 7/26/23.  Pt would like to move forward with Accutane.    I think Loganne would respond well to isotretinoin.  We discussed at length about how isotretinoin is a know teratogen.  We discussed that there have been reports of depression with suicide in patients on isotretinoin and that there have been reports of an increased risk of IBD on isotretinoin although several meta analyses have contradicted this.  We discussed risk of increased liver markers and lipids. We discussed expectations of dry skin, lips and eyes. He will not share the medication or donate blood while on isotretinoin.     He will come in to sign consent forms  for the iPledge paperwork. He will have fasting lipids as well.   Plan to prescribed 40 mg of isotretinoin daily with food after labs and consents.      Procedures Performed:    None     Follow-up: 1 month(s) virtually  or in person or earlier for new or changing lesions     Staff and Scribe:   Scribe Disclosure:   By signing my name below, I, Addie Palmer, attest that this documentation has been prepared under the direction and in the presence of Dr. Monserrat Celeste PA-C.  - Electronically Signed: Addie Palmer 05/15/24       Provider Disclosure:  I agree with above History, Review of Systems, Physical exam and Plan.  I have reviewed the content of the documentation and have edited it as needed. I have personally performed the services documented here and the documentation accurately represents those services and the decisions I have made.      Electronically signed by:  All risks, benefits and alternatives were discussed with patient.  Patient is in agreement and understands the assessment and plan.  All questions were answered.  Sun Screen Education was given.   Return to Clinic in 1 month or sooner as needed.   Monserrat Celeste PA-C      ____________________________________________     CC: Acne (Back acne.  Some improvement with Ketoconazole.  Would like to discuss Accutane (has never been on it before) )        HPI:  Gregg Maharaj is a(n) 28 year old transgender male who presents today as a return patient for acne. Last seen by Dr Jackson when he was prescribed oxybutin 10 mg, continued on ketoconazole shampoo for seb derm and folliculitis and acne. Also continued on tretinoin.    I spoke with Gregg. Acne is still present and would like to consider Accutane. Pt had a total hysterectomy and salpingectomy 7/26/23. Was also recently diagnosed with Epilepsy and is on Keppra with significantly improved symptoms.      Patient is otherwise feeling well, without additional skin concerns.     Labs Reviewed:  CBC, CMP 3/14/24     Physical Exam:  Vitals: No vitals taken due to Virtual/Phone Visit  No photos available.        Medications:  Current Outpatient Medications   Medication Sig Dispense Refill    ACCU-CHEK GUIDE test strip  TEST ONCE DAILY OR WITH SYMPTOMS. Follow up visit needed. Call  to schedule. 100 strip 1    albuterol (PROAIR HFA/PROVENTIL HFA/VENTOLIN HFA) 108 (90 Base) MCG/ACT inhaler Inhale 2 puffs into the lungs as needed      azelastine (ASTELIN) 0.1 % nasal spray USE 1 TO 2 SPRAYS IN EACH NOSTRIL 1 TO 2 TIMES DAILY AS NEEDED      baclofen (LIORESAL) 10 MG tablet TAKE 1 TO 2 TABLETS BY MOUTH EVERY NIGHT      buPROPion (WELLBUTRIN XL) 150 MG 24 hr tablet Take 150 mg by mouth every morning      calcipotriene (DOVONOX) 0.005 % external solution Apply 10-15 drops to scalp at nighttime before bed 60 mL 11    cetirizine (ZYRTEC) 10 MG tablet Take 10 mg by mouth daily Pt takes at HS      clindamycin-benzoyl peroxide (BENZACLIN) 1-5 % external gel as needed      clotrimazole (LOTRIMIN) 1 % external cream as needed      CONCERTA 54 MG CR tablet       Continuous Blood Gluc Sensor (FREESTYLE JOSSY 2 SENSOR) MISC Change SENSOR EVERY 14 DAYS. Follow up visit needed. Call  to schedule. 2 each 3    diazepam (VALIUM) 5 MG tablet VAGINAL VALIUM SUPPOSITORY 5MG AT NIGHT AND PRIOR TO THERAPY AS NEEDED (Patient taking differently: as needed VAGINAL VALIUM SUPPOSITORY 5MG AT NIGHT AND PRIOR TO THERAPY AS NEEDED) 40 tablet 3    diphenhydrAMINE (BENADRYL) 25 MG capsule Take 25 mg by mouth as needed      EPINEPHrine (ANY BX GENERIC EQUIV) 0.3 MG/0.3ML injection 2-pack Inject into the lateral thigh as needed for life threatening allergic reactions.      estradiol cypionate (DEPO-ESTRADIOL) 5 MG/ML injection Inject into the muscle every 3 months      famotidine (PEPCID) 20 MG tablet Take 20 mg by mouth 2 times daily      fluticasone (FLONASE) 50 MCG/ACT nasal spray Spray 2 sprays into both nostrils 2 times daily 16 g 11    gabapentin (NEURONTIN) 300 MG capsule Take 300-600 mg by mouth 3 times daily as needed 900 mg at night      HYDROcodone-acetaminophen (NORCO) 5-325 MG tablet Take 1 tablet by mouth every 6 hours as needed  for severe pain      ibuprofen (ADVIL/MOTRIN) 200 MG capsule Take by mouth as needed      ketoconazole (NIZORAL) 2 % external shampoo APPLY TOPICALLY EVERY OTHER DAY TO WET SCALP AND BACK AND LET SIT FOR 3 TO5 MINUTES 120 mL 8    levETIRAcetam (KEPPRA) 500 MG tablet Take 500 mg by mouth 2 times daily      loratadine (CLARITIN) 10 MG tablet Take 10 mg by mouth every morning      metFORMIN (GLUCOPHAGE) 500 MG tablet Take 1 tablet (500 mg) by mouth daily (with breakfast) 90 tablet 3    Methylphenidate HCl (RITALIN PO) Take 36 mg by mouth 3 times daily (with meals)      methylphenidate HCl ER, OSM, (CONCERTA) 18 MG CR tablet Take 18 mg by mouth      minoxidil (LONITEN) 2.5 MG tablet Take 1/2 tablet (1.25 mg) in the morning once daily. 45 tablet 3    Minoxidil 5 % FOAM Apply thin layer twice daily to scalp 60 g 11    montelukast (SINGULAIR) 10 MG tablet Take 10 mg by mouth At Bedtime      ondansetron (ZOFRAN ODT) 4 MG ODT tab Take 1 tablet (4 mg) by mouth every 8 hours as needed for nausea 12 tablet 0    OTHER MEDICAL SUPPLIES       oxybutynin ER (DITROPAN XL) 10 MG 24 hr tablet Take 1 tablet (10 mg) by mouth daily 30 tablet 11    oxybutynin ER (DITROPAN XL) 5 MG 24 hr tablet Take 1 tablet (5 mg) by mouth daily 90 tablet 2    propranolol ER (INDERAL LA) 160 MG 24 hr capsule Take 160 mg by mouth At Bedtime      Specialty Vitamins Products (VITAMINS FOR HAIR) CAPS Take 1 tablet by mouth daily      SUMAtriptan (IMITREX) 50 MG tablet Take 50 mg by mouth at onset of headache      tacrolimus (PROTOPIC) 0.1 % external ointment Apply topically 2 times daily To rashes on the hands as needed. 60 g 3    testosterone (ANDROGEL 1.62 % PUMP) 20.25 MG/ACT gel Place 2 Pump onto the skin every morning      tretinoin (RETIN-A) 0.05 % external cream Apply topically At Bedtime To the back every other night (increase to nightly if tolerated). Apply a moisturizer on top. 45 g 11    ubrogepant (UBRELVY) 50 MG tablet Take 50 mg by mouth at  onset of headache PRN      venlafaxine (EFFEXOR-ER) 150 MG TB24 24 hr tablet Take 300 mg by mouth every morning      vitamin D3 (CHOLECALCIFEROL) 10 MCG (400 UNIT) capsule Take 2 capsules (800 Units) by mouth daily 60 capsule 11     Current Facility-Administered Medications   Medication Dose Route Frequency Provider Last Rate Last Admin    ciprofloxacin (CIPRO) tablet 500 mg  500 mg Oral Once Arden Mercedes MD                       Past Medical/Surgical History:   Past Medical History:   Diagnosis Date    ADHD (attention deficit hyperactivity disorder)     VERNON positive     Anxiety     Asthma     Borderline personality disorder (H)     Concussion     Depression     Fibromyalgia     Gastroesophageal reflux disease with esophagitis     Gender dysphoria in adolescent and adult     Hypermobility of joint     Hypersomnia     Migraine     Mucous retention cyst of maxillary sinus     Obesity     PTSD (post-traumatic stress disorder)     Seasonal allergic rhinitis     Urinary frequency

## 2024-05-16 NOTE — PATIENT INSTRUCTIONS
Pediatric Dermatology  [place] [address] [place] [id number]   [phone]   Isotretinoin/Accutane      What is Isotretinoin?     Isotretinoin, more commonly known by its former brand name of  Accutane , is an oral treatment for acne derived from Vitamin A. It is the most effective acne treatment available and often results in a long-term remission or  cure . It has been used since the 1980s in various formulations. It is used to treat moderate or severe acne, or acne that is causing scars.     How does isotretinoin work?  Decreases the size of oil glands and oil production   Prevents growth of acne-causing bacteria   Allows the skin cells to mature more normally   Reduces skin inflammation     How long do I need to take isotretinoin?     Patients typically take the medicine for 6-8 months until reaching a weight-based  goal dose . Some people may need to take isotretinoin longer depending on their response to treatment. Always take isotretinoin with food because it needs the help from dietary fat to be absorbed.     What is  iPledge ?     iPledge website: [email or web address]     Babies born to mothers taking isotretinoin can have birth defects. The medicine is therefore regulated by a national program called  iPledge . There is no risk of birth defects in babies born to males taking isotretinoin. The birth defect risk for females lasts for one month after stopping the medication. There is no impact on fertility.     Patients are registered in iPledge under one of two categories:  1.  Patients who can get pregnant : Patients with functional female reproductive organs   2.  Patients who cannot get pregnant : Patients without functional female reproductive organs and patients with male reproductive organs    All patients:   To qualify for a prescription for isotretinoin we will need to enroll you in the iPledge program   You cannot share your medication   You cannot donate blood  while taking isotretinoin and for one month after        Patients who can get pregnant:   You need to use two forms or birth control or be abstinent from sexual activity   Women need to take two pregnancy tests at least 30 days apart prior to starting isotretinoin, and then a pregnancy test monthly   Each month you need to log in to the iPledge website to answer comprehension questions showing that you know to avoid pregnancy while taking isotretinoin.   After your clinic visit you will only have 7 days to  your prescription at the pharmacy. If you miss the pick-up window you will need to take another pregnancy test.     Do I need blood work?   Because isotretinoin can rarely cause changes in liver tests and lipid levels you will need initial lab monitoring for safety. In most cases, blood work is needed at baseline, and after dose increases.      *Patients of childbearing potential are required per the iPledge system, to complete a pregnancy test each month. Your prescribing provider will discuss more details about this with you.      Lab testing:   Labs should be collected at an [facility] location when possible. If you prefer to have them collected at a non-[facility] facility, please ensure that the results are faxed to our office at [phone]. Be aware there may be a delay in receiving results from outside clinics.      What are the side effects?   Almost everyone will have dry skin and lips when taking isotretinoin. Patients who wear contacts may especially notice more eye dryness. All side effects will stop when the isotretinoin is stopped. It s important to tell your doctor about side effects so that we can help to treat or prevent them.     Common:     Dryness: skin, eyes, nose, lips   Slight elevation of blood lipids (triglycerides)   Sun sensitivity   Skin fragility    Less common:   Headaches- contact clinic if severe and persistent   Muscle aches   Nausea   Nose bleeds   Decreased night  vision    Very rare:  Changes in liver enzymes   Very high triglycerides        Troubleshooting tips for common side effects:    Dry lips: Apply Vaseline or Aquaphor throughout the day and at bedtime.   Nosebleeds: Apply a small amount of Vaseline or Aquaphor just inside each nostril nightly at bedtime.   Dry skin: Use a mild gentle soap for bathing and a moisturizer daily. Avoid exfoliating the skin. Avoid acne washes.   Dry eyes: Use lubricating eye drops throughout the day. Decrease contact lens use.     What about mood changes?     You may have read that isotretinoin has been linked with mood changes, depression, and suicidality. A recent large study reviewing data linking isotretinoin and depression found no association (improvements in depression were actually noted). As this question has not been definitively answered we will continue to ask about your mood each month. Please stop the medication and call our clinic if you are noticing symptoms of increased sadness/depression. Seek immediate medical attention if you have thoughts of suicide or self-harm.     Commonly asked Questions:    Should I continue my other acne treatments while I take isotretinoin?   Please stop all other acne treatments. This includes oral antibiotics, acne creams, and acne washes. These may be too harsh for use with isotretinoin, causing extra skin dryness.     Females should continue birth control pills while on isotretinoin unless instructed to stop them.     What if I have problems getting my medicine at the pharmacy?  If you are not able to obtain your medicine from the pharmacy, please contact our clinic as soon as possible.     What if I missed my appointment?  We cannot dispense an isotretinoin refill if you have not been seen. Please contact the nursing line to coordinate an appointment.     What should I do if I forgot to take my medication?  It is ok to take a double dose the following day. If you have missed several days of  medicine, notify your provider at your next appointment to make a plan.    What if I m going to run out of medicine before my next appointment?  Going without the medicine for several days is not a concern. The medicine works in the long-term so this will not be a setback.     Contact info:  If you have any questions or concerns about your isotretinoin, please call the Division of Pediatric Dermatology at [facility] at *[phone]* during clinic hours. If you have questions or concerns over the weekend, a holiday or after clinic hours please call *[phone]* and ask for the Dermatology Resident on-call to be paged.

## 2024-05-20 ENCOUNTER — LAB (OUTPATIENT)
Dept: LAB | Facility: CLINIC | Age: 29
End: 2024-05-20
Payer: COMMERCIAL

## 2024-05-20 ENCOUNTER — ALLIED HEALTH/NURSE VISIT (OUTPATIENT)
Dept: DERMATOLOGY | Facility: CLINIC | Age: 29
End: 2024-05-20
Payer: COMMERCIAL

## 2024-05-20 DIAGNOSIS — L70.0 ACNE VULGARIS: Primary | ICD-10-CM

## 2024-05-20 DIAGNOSIS — L70.0 ACNE VULGARIS: ICD-10-CM

## 2024-05-20 LAB
CHOLEST SERPL-MCNC: 162 MG/DL
FASTING STATUS PATIENT QL REPORTED: YES
HDLC SERPL-MCNC: 37 MG/DL
LDLC SERPL CALC-MCNC: 97 MG/DL
NONHDLC SERPL-MCNC: 125 MG/DL
TRIGL SERPL-MCNC: 138 MG/DL

## 2024-05-20 PROCEDURE — 80061 LIPID PANEL: CPT | Performed by: PATHOLOGY

## 2024-05-20 PROCEDURE — 36415 COLL VENOUS BLD VENIPUNCTURE: CPT | Performed by: PATHOLOGY

## 2024-05-20 PROCEDURE — 99207 PR NO CHARGE NURSE ONLY: CPT | Performed by: DERMATOLOGY

## 2024-05-20 RX ORDER — ISOTRETINOIN 40 MG/1
CAPSULE ORAL
Qty: 30 CAPSULE | Refills: 0 | Status: SHIPPED | OUTPATIENT
Start: 2024-05-20 | End: 2024-06-20

## 2024-05-20 NOTE — PROGRESS NOTES
Mohsensusanne MOSELEY Nicko comes into clinic today at the request of Monserrat Celeste PA-C Ordering Provider for ipledge registration/paper work. Pt completed and was entered in the system. Labs done after appointment.    This service provided today was under the supervising provider of the day Dr Zurita, who was available if needed.    Carla Gonsalves RN

## 2024-06-19 ENCOUNTER — VIRTUAL VISIT (OUTPATIENT)
Dept: DERMATOLOGY | Facility: CLINIC | Age: 29
End: 2024-06-19
Payer: COMMERCIAL

## 2024-06-19 DIAGNOSIS — M25.59 PAIN IN OTHER JOINT: ICD-10-CM

## 2024-06-19 DIAGNOSIS — L70.0 ACNE VULGARIS: Primary | ICD-10-CM

## 2024-06-19 DIAGNOSIS — Z79.899 ON ISOTRETINOIN THERAPY: ICD-10-CM

## 2024-06-19 PROCEDURE — 99441 PR PHYSICIAN TELEPHONE EVALUATION 5-10 MIN: CPT | Mod: 93 | Performed by: PHYSICIAN ASSISTANT

## 2024-06-19 ASSESSMENT — PAIN SCALES - GENERAL: PAINLEVEL: NO PAIN (0)

## 2024-06-19 NOTE — PROGRESS NOTES
[facility] Dermatology Note  Encounter Date: Jun 19, 2024  Store-and-Forward and Telephone ([phone] ). Location of teledermatologist: [facility] [place].  Start time: 10:20. End time: 10:30.    Dermatology Problem List:  1. Nonscarring alopecia consistent with Telogen effluvium   - Current tx: none   - Prior offered: Rogaine 5% foam (not covered by insurance), minoxidil 1.25 mg (low bp exacerbating POTS)   2. Folliculitis  - ketoconazole 2% shampoo, tretinoin cream  3. Irritant hand dermatitis  - Current tx: tacrolimus ointment  - Prior tx: triamcinolone 0.1% ointment bid  4. Referred to genetics- family hx of EDS  5. Hyperhidrosis   - Current tx: oxybutynin 10 mg      ____________________________________________     Assessment & Plan:      #Acne vulgaris face/ folliculitis, back, improving.   Finished 1st month of isotretinoin,       I think Loganne would respond well to isotretinoin.  We discussed at length about how isotretinoin is a know teratogen.  We discussed that there have been reports of depression with suicide in patients on isotretinoin and that there have been reports of an increased risk of IBD on isotretinoin although several meta analyses have contradicted this.  We discussed risk of increased liver markers and lipids. We discussed expectations of dry skin, lips and eyes. He will not share the medication or donate blood while on isotretinoin.     Ipledge #[id number]  Continue isotretinoin 40 mg daily. Recheck labs at next visit.   Cumulative dose: 1200mg      Had a total hysterectomy and salpingectomy 7/26/23.    # Arthralgias, secondary to isotretinoin  Start B12 500 mcg daily  Start 1 mg folic acid daily  Continue Omega 3 fish oil daily with isotretinoin.     June 19, 2024 - July 18, 2024    Procedures Performed:   None      Follow-up: 1 month(s) in-person, or earlier for new or changing lesions    Staff and Scribe:     Scribe Disclosure:   I,  GEORGE VOGT am serving as a scribe; to document services personally performed by Monserrat Celeste PA-C-based on data collection and the provider's statements to me.    All risks, benefits and alternatives were discussed with patient.  Patient is in agreement and understands the assessment and plan.  All questions were answered.  Sun Screen Education was given.   Return to Clinic annually or sooner as needed.   Monserrat Celeste PA-C    ____________________________________________    CC: Derm Problem (Accutane- Dry skin, skin peeling, body aches. )    HPI:  Mr. Gregg Maharaj is a(n) 28 year old adult who presents today as a return patient for acne. Last seen in dermatology by me on 5/15/24, for a virtual accutane constultation. He came into the office and signed consents and was started on 40 mg of isotretinoin daily.     Today, the patient reports improved acne but worsening body aches. Some dry lips and skin. Gregg is not sharing the medication or donating blood.     Denies headaches, visual changes, epistaxis, abdominal pain, stool changes, hematochezia, mood changes, depression or suicidal ideations.     Patient is otherwise feeling well, without additional skin concerns.    Labs Reviewed:  Recent Labs   Lab Test 05/20/24  1145   CHOL 162   HDL 37*   LDL 97   TRIG 138        Physical Exam:  Vitals: There were no vitals taken for this visit.  SKIN: Teledermatology photos were reviewed; image quality and interpretability: acceptable.   - Excoriated papules on the forehead, cheeks and chin a few on the back.Hyperemic and atrophic macules on the posterior shoulders.     - No other lesions of concern on areas examined.                       Medications:  Current Outpatient Medications   Medication Sig Dispense Refill    ACCU-CHEK GUIDE test strip TEST ONCE DAILY OR WITH SYMPTOMS. Follow up visit needed. Call  to schedule. 100 strip 1    albuterol (PROAIR HFA/PROVENTIL HFA/VENTOLIN HFA) 108 (90  Base) MCG/ACT inhaler Inhale 2 puffs into the lungs as needed      azelastine (ASTELIN) 0.1 % nasal spray USE 1 TO 2 SPRAYS IN EACH NOSTRIL 1 TO 2 TIMES DAILY AS NEEDED      baclofen (LIORESAL) 10 MG tablet TAKE 1 TO 2 TABLETS BY MOUTH EVERY NIGHT      buPROPion (WELLBUTRIN XL) 150 MG 24 hr tablet Take 150 mg by mouth every morning      calcipotriene (DOVONOX) 0.005 % external solution Apply 10-15 drops to scalp at nighttime before bed 60 mL 11    cetirizine (ZYRTEC) 10 MG tablet Take 10 mg by mouth daily Pt takes at HS      clindamycin-benzoyl peroxide (BENZACLIN) 1-5 % external gel as needed      clotrimazole (LOTRIMIN) 1 % external cream as needed      CONCERTA 54 MG CR tablet       Continuous Blood Gluc Sensor (FREESTYLE JOSSY 2 SENSOR) MISC Change SENSOR EVERY 14 DAYS. Follow up visit needed. Call  to schedule. 2 each 3    diazepam (VALIUM) 5 MG tablet VAGINAL VALIUM SUPPOSITORY 5MG AT NIGHT AND PRIOR TO THERAPY AS NEEDED (Patient taking differently: as needed VAGINAL VALIUM SUPPOSITORY 5MG AT NIGHT AND PRIOR TO THERAPY AS NEEDED) 40 tablet 3    diphenhydrAMINE (BENADRYL) 25 MG capsule Take 25 mg by mouth as needed      EPINEPHrine (ANY BX GENERIC EQUIV) 0.3 MG/0.3ML injection 2-pack Inject into the lateral thigh as needed for life threatening allergic reactions.      estradiol cypionate (DEPO-ESTRADIOL) 5 MG/ML injection Inject into the muscle every 3 months      famotidine (PEPCID) 20 MG tablet Take 20 mg by mouth 2 times daily      fluticasone (FLONASE) 50 MCG/ACT nasal spray Spray 2 sprays into both nostrils 2 times daily 16 g 11    gabapentin (NEURONTIN) 300 MG capsule Take 300-600 mg by mouth 3 times daily as needed 900 mg at night      HYDROcodone-acetaminophen (NORCO) 5-325 MG tablet Take 1 tablet by mouth every 6 hours as needed for severe pain      ibuprofen (ADVIL/MOTRIN) 200 MG capsule Take by mouth as needed      ISOtretinoin (ACCUTANE) 40 MG capsule Take 40 mg daily with food. Ipledge#  [id number] 30 capsule 0    ketoconazole (NIZORAL) 2 % external shampoo APPLY TOPICALLY EVERY OTHER DAY TO WET SCALP AND BACK AND LET SIT FOR 3 TO5 MINUTES 120 mL 8    levETIRAcetam (KEPPRA) 500 MG tablet Take 500 mg by mouth 2 times daily      loratadine (CLARITIN) 10 MG tablet Take 10 mg by mouth every morning      metFORMIN (GLUCOPHAGE) 500 MG tablet Take 1 tablet (500 mg) by mouth daily (with breakfast) 90 tablet 3    Methylphenidate HCl (RITALIN PO) Take 36 mg by mouth 3 times daily (with meals)      methylphenidate HCl ER, OSM, (CONCERTA) 18 MG CR tablet Take 18 mg by mouth      minoxidil (LONITEN) 2.5 MG tablet Take 1/2 tablet (1.25 mg) in the morning once daily. 45 tablet 3    Minoxidil 5 % FOAM Apply thin layer twice daily to scalp 60 g 11    montelukast (SINGULAIR) 10 MG tablet Take 10 mg by mouth At Bedtime      ondansetron (ZOFRAN ODT) 4 MG ODT tab Take 1 tablet (4 mg) by mouth every 8 hours as needed for nausea 12 tablet 0    OTHER MEDICAL SUPPLIES       oxybutynin ER (DITROPAN XL) 10 MG 24 hr tablet Take 1 tablet (10 mg) by mouth daily 30 tablet 11    oxybutynin ER (DITROPAN XL) 5 MG 24 hr tablet Take 1 tablet (5 mg) by mouth daily 90 tablet 2    propranolol ER (INDERAL LA) 160 MG 24 hr capsule Take 160 mg by mouth At Bedtime      Specialty Vitamins Products (VITAMINS FOR HAIR) CAPS Take 1 tablet by mouth daily      SUMAtriptan (IMITREX) 50 MG tablet Take 50 mg by mouth at onset of headache      tacrolimus (PROTOPIC) 0.1 % external ointment Apply topically 2 times daily To rashes on the hands as needed. 60 g 3    testosterone (ANDROGEL 1.62 % PUMP) 20.25 MG/ACT gel Place 2 Pump onto the skin every morning      tretinoin (RETIN-A) 0.05 % external cream Apply topically At Bedtime To the back every other night (increase to nightly if tolerated). Apply a moisturizer on top. 45 g 11    ubrogepant (UBRELVY) 50 MG tablet Take 50 mg by mouth at onset of headache PRN      venlafaxine (EFFEXOR-ER) 150 MG TB24 24  hr tablet Take 300 mg by mouth every morning      vitamin D3 (CHOLECALCIFEROL) 10 MCG (400 UNIT) capsule Take 2 capsules (800 Units) by mouth daily 60 capsule 11     Current Facility-Administered Medications   Medication Dose Route Frequency Provider Last Rate Last Admin    ciprofloxacin (CIPRO) tablet 500 mg  500 mg Oral Once Arden Mercedes MD          Past Medical History:   Patient Active Problem List   Diagnosis    Acute UTI    VERNON positive    Bilateral leg pain    Dysmenorrhea    Elevated CK    Mild intermittent asthma without complication    Narcolepsy and cataplexy    Panic    Urinary urgency    Constipation    Facial pain    Seasonal allergic rhinitis, unspecified trigger    Atopic dermatitis, unspecified type     Past Medical History:   Diagnosis Date    ADHD (attention deficit hyperactivity disorder)     VERNON positive     Anxiety     Asthma     Borderline personality disorder (H)     Concussion     Depression     Fibromyalgia     Gastroesophageal reflux disease with esophagitis     Gender dysphoria in adolescent and adult     Hypermobility of joint     Hypersomnia     Migraine     Mucous retention cyst of maxillary sinus     Obesity     PTSD (post-traumatic stress disorder)     Seasonal allergic rhinitis     Urinary frequency         CC No referring provider defined for this encounter. on close of this encounter.

## 2024-06-19 NOTE — PATIENT INSTRUCTIONS
Pediatric Dermatology  [place] [id number] [place] [id number]   [phone]   Isotretinoin/Accutane      What is Isotretinoin?     Isotretinoin, more commonly known by its former brand name of  Accutane , is an oral treatment for acne derived from Vitamin A. It is the most effective acne treatment available and often results in a long-term remission or  cure . It has been used since the 1980s in various formulations. It is used to treat moderate or severe acne, or acne that is causing scars.     How does isotretinoin work?  Decreases the size of oil glands and oil production   Prevents growth of acne-causing bacteria   Allows the skin cells to mature more normally   Reduces skin inflammation     How long do I need to take isotretinoin?     Patients typically take the medicine for 6-8 months until reaching a weight-based  goal dose . Some people may need to take isotretinoin longer depending on their response to treatment. Always take isotretinoin with food because it needs the help from dietary fat to be absorbed.     What is  iPledge ?     iPledge website: [employer]reg[employer]Fresh[employer]k     Babies born to mothers taking isotretinoin can have birth defects. The medicine is therefore regulated by a national program called  iPledge . There is no risk of birth defects in babies born to males taking isotretinoin. The birth defect risk for females lasts for one month after stopping the medication. There is no impact on fertility.     Patients are registered in iPledge under one of two categories:  1.  Patients who can get pregnant : Patients with functional female reproductive organs   2.  Patients who cannot get pregnant : Patients without functional female reproductive organs and patients with male reproductive organs    All patients:   To qualify for a prescription for isotretinoin we will need to enroll you in the iPledge program   You cannot share your medication   You cannot donate blood  while taking isotretinoin and for one month after        Patients who can get pregnant:   You need to use two forms or birth control or be abstinent from sexual activity   Women need to take two pregnancy tests at least 30 days apart prior to starting isotretinoin, and then a pregnancy test monthly   Each month you need to log in to the iPledge website to answer comprehension questions showing that you know to avoid pregnancy while taking isotretinoin.   After your clinic visit you will only have 7 days to  your prescription at the pharmacy. If you miss the pick-up window you will need to take another pregnancy test.     Do I need blood work?   Because isotretinoin can rarely cause changes in liver tests and lipid levels you will need initial lab monitoring for safety. In most cases, blood work is needed at baseline, and after dose increases.      *Patients of childbearing potential are required per the iPledge system, to complete a pregnancy test each month. Your prescribing provider will discuss more details about this with you.      Lab testing:   Labs should be collected at an [facility] location when possible. If you prefer to have them collected at a non-[facility] facility, please ensure that the results are faxed to our office at [phone]. Be aware there may be a delay in receiving results from outside clinics.      What are the side effects?   Almost everyone will have dry skin and lips when taking isotretinoin. Patients who wear contacts may especially notice more eye dryness. All side effects will stop when the isotretinoin is stopped. It s important to tell your doctor about side effects so that we can help to treat or prevent them.     Common:     Dryness: skin, eyes, nose, lips   Slight elevation of blood lipids (triglycerides)   Sun sensitivity   Skin fragility    Less common:   Headaches- contact clinic if severe and persistent   Muscle aches   Nausea   Nose bleeds   Decreased night  vision    Very rare:  Changes in liver enzymes   Very high triglycerides        Troubleshooting tips for common side effects:    Dry lips: Apply Vaseline or Aquaphor throughout the day and at bedtime.   Nosebleeds: Apply a small amount of Vaseline or Aquaphor just inside each nostril nightly at bedtime.   Dry skin: Use a mild gentle soap for bathing and a moisturizer daily. Avoid exfoliating the skin. Avoid acne washes.   Dry eyes: Use lubricating eye drops throughout the day. Decrease contact lens use.     What about mood changes?     You may have read that isotretinoin has been linked with mood changes, depression, and suicidality. A recent large study reviewing data linking isotretinoin and depression found no association (improvements in depression were actually noted). As this question has not been definitively answered we will continue to ask about your mood each month. Please stop the medication and call our clinic if you are noticing symptoms of increased sadness/depression. Seek immediate medical attention if you have thoughts of suicide or self-harm.     Commonly asked Questions:    Should I continue my other acne treatments while I take isotretinoin?   Please stop all other acne treatments. This includes oral antibiotics, acne creams, and acne washes. These may be too harsh for use with isotretinoin, causing extra skin dryness.     Females should continue birth control pills while on isotretinoin unless instructed to stop them.     What if I have problems getting my medicine at the pharmacy?  If you are not able to obtain your medicine from the pharmacy, please contact our clinic as soon as possible.     What if I missed my appointment?  We cannot dispense an isotretinoin refill if you have not been seen. Please contact the nursing line to coordinate an appointment.     What should I do if I forgot to take my medication?  It is ok to take a double dose the following day. If you have missed several days of  medicine, notify your provider at your next appointment to make a plan.    What if I m going to run out of medicine before my next appointment?  Going without the medicine for several days is not a concern. The medicine works in the long-term so this will not be a setback.     Contact info:  If you have any questions or concerns about your isotretinoin, please call the Division of Pediatric Dermatology at [facility] at *[phone]* during clinic hours. If you have questions or concerns over the weekend, a holiday or after clinic hours please call *[phone]* and ask for the Dermatology Resident on-call to be paged.

## 2024-06-19 NOTE — LETTER
6/19/2024       RE: Gregg Mhaaraj  [facility]  [address]  [place] [address]     Dear Colleague,    Thank you for referring your patient, Gregg Maharaj, to the [facility] DERMAT[facility] [place] at [facility]. Please see a copy of my visit note below.    [facility] Dermatology Note  Encounter Date: Jun 19, 2024  Store-and-Forward and Telephone ([phone] ). Location of teledermatologist: [facility]MAT[facility] [place].  Start time: 10:20. End time: 10:30.    Dermatology Problem List:  1. Nonscarring alopecia consistent with Telogen effluvium   - Current tx: none   - Prior offered: Rogaine 5% foam (not covered by insurance), minoxidil 1.25 mg (low bp exacerbating POTS)   2. Folliculitis  - ketoconazole 2% shampoo, tretinoin cream  3. Irritant hand dermatitis  - Current tx: tacrolimus ointment  - Prior tx: triamcinolone 0.1% ointment bid  4. Referred to genetics- family hx of EDS  5. Hyperhidrosis   - Current tx: oxybutynin 10 mg      ____________________________________________     Assessment & Plan:      #Acne vulgaris face/ folliculitis, back, improving.   Finished 1st month of isotretinoin,       I think Gregg would respond well to isotretinoin.  We discussed at length about how isotretinoin is a know teratogen.  We discussed that there have been reports of depression with suicide in patients on isotretinoin and that there have been reports of an increased risk of IBD on isotretinoin although several meta analyses have contradicted this.  We discussed risk of increased liver markers and lipids. We discussed expectations of dry skin, lips and eyes. He will not share the medication or donate blood while on isotretinoin.     Ipledge #[id number]  Continue isotretinoin 40 mg daily. Recheck labs at next visit.   Cumulative dose: 1200mg      Had a total hysterectomy and salpingectomy 7/26/23.    # Arthralgias,  secondary to isotretinoin  Start B12 500 mcg daily  Start 1 mg folic acid daily  Continue Omega 3 fish oil daily with isotretinoin.     June 19, 2024 - July 18, 2024    Procedures Performed:   None      Follow-up: 1 month(s) in-person, or earlier for new or changing lesions    Staff and Scribe:     Scribe Disclosure:   MARIE GEORGE HAYLEYMARIE, am serving as a scribe; to document services personally performed by Monserrat Celeste PA-C-based on data collection and the provider's statements to me.    All risks, benefits and alternatives were discussed with patient.  Patient is in agreement and understands the assessment and plan.  All questions were answered.  Sun Screen Education was given.   Return to Clinic annually or sooner as needed.   Monserrat Celeste PA-C    ____________________________________________    CC: Derm Problem (Accutane- Dry skin, skin peeling, body aches. )    HPI:  Mr. Gregg Maharaj is a(n) 28 year old adult who presents today as a return patient for acne. Last seen in dermatology by me on 5/15/24, for a virtual accutane constultation. He came into the office and signed consents and was started on 40 mg of isotretinoin daily.     Today, the patient reports improved acne but worsening body aches. Some dry lips and skin. Gregg is not sharing the medication or donating blood.     Denies headaches, visual changes, epistaxis, abdominal pain, stool changes, hematochezia, mood changes, depression or suicidal ideations.     Patient is otherwise feeling well, without additional skin concerns.    Labs Reviewed:  Recent Labs   Lab Test 05/20/24  1145   CHOL 162   HDL 37*   LDL 97   TRIG 138        Physical Exam:  Vitals: There were no vitals taken for this visit.  SKIN: Teledermatology photos were reviewed; image quality and interpretability: acceptable.   - Excoriated papules on the forehead, cheeks and chin a few on the back.Hyperemic and atrophic macules on the posterior shoulders.     - No other  lesions of concern on areas examined.                       Medications:  Current Outpatient Medications   Medication Sig Dispense Refill    ACCU-CHEK GUIDE test strip TEST ONCE DAILY OR WITH SYMPTOMS. Follow up visit needed. Call  to schedule. 100 strip 1    albuterol (PROAIR HFA/PROVENTIL HFA/VENTOLIN HFA) 108 (90 Base) MCG/ACT inhaler Inhale 2 puffs into the lungs as needed      azelastine (ASTELIN) 0.1 % nasal spray USE 1 TO 2 SPRAYS IN EACH NOSTRIL 1 TO 2 TIMES DAILY AS NEEDED      baclofen (LIORESAL) 10 MG tablet TAKE 1 TO 2 TABLETS BY MOUTH EVERY NIGHT      buPROPion (WELLBUTRIN XL) 150 MG 24 hr tablet Take 150 mg by mouth every morning      calcipotriene (DOVONOX) 0.005 % external solution Apply 10-15 drops to scalp at nighttime before bed 60 mL 11    cetirizine (ZYRTEC) 10 MG tablet Take 10 mg by mouth daily Pt takes at HS      clindamycin-benzoyl peroxide (BENZACLIN) 1-5 % external gel as needed      clotrimazole (LOTRIMIN) 1 % external cream as needed      CONCERTA 54 MG CR tablet       Continuous Blood Gluc Sensor (FREESTYLE JOSSY 2 SENSOR) MISC Change SENSOR EVERY 14 DAYS. Follow up visit needed. Call  to schedule. 2 each 3    diazepam (VALIUM) 5 MG tablet VAGINAL VALIUM SUPPOSITORY 5MG AT NIGHT AND PRIOR TO THERAPY AS NEEDED (Patient taking differently: as needed VAGINAL VALIUM SUPPOSITORY 5MG AT NIGHT AND PRIOR TO THERAPY AS NEEDED) 40 tablet 3    diphenhydrAMINE (BENADRYL) 25 MG capsule Take 25 mg by mouth as needed      EPINEPHrine (ANY BX GENERIC EQUIV) 0.3 MG/0.3ML injection 2-pack Inject into the lateral thigh as needed for life threatening allergic reactions.      estradiol cypionate (DEPO-ESTRADIOL) 5 MG/ML injection Inject into the muscle every 3 months      famotidine (PEPCID) 20 MG tablet Take 20 mg by mouth 2 times daily      fluticasone (FLONASE) 50 MCG/ACT nasal spray Spray 2 sprays into both nostrils 2 times daily 16 g 11    gabapentin (NEURONTIN) 300 MG capsule  Take 300-600 mg by mouth 3 times daily as needed 900 mg at night      HYDROcodone-acetaminophen (NORCO) 5-325 MG tablet Take 1 tablet by mouth every 6 hours as needed for severe pain      ibuprofen (ADVIL/MOTRIN) 200 MG capsule Take by mouth as needed      ISOtretinoin (ACCUTANE) 40 MG capsule Take 40 mg daily with food. Ipledge# [id number] 30 capsule 0    ketoconazole (NIZORAL) 2 % external shampoo APPLY TOPICALLY EVERY OTHER DAY TO WET SCALP AND BACK AND LET SIT FOR 3 TO5 MINUTES 120 mL 8    levETIRAcetam (KEPPRA) 500 MG tablet Take 500 mg by mouth 2 times daily      loratadine (CLARITIN) 10 MG tablet Take 10 mg by mouth every morning      metFORMIN (GLUCOPHAGE) 500 MG tablet Take 1 tablet (500 mg) by mouth daily (with breakfast) 90 tablet 3    Methylphenidate HCl (RITALIN PO) Take 36 mg by mouth 3 times daily (with meals)      methylphenidate HCl ER, OSM, (CONCERTA) 18 MG CR tablet Take 18 mg by mouth      minoxidil (LONITEN) 2.5 MG tablet Take 1/2 tablet (1.25 mg) in the morning once daily. 45 tablet 3    Minoxidil 5 % FOAM Apply thin layer twice daily to scalp 60 g 11    montelukast (SINGULAIR) 10 MG tablet Take 10 mg by mouth At Bedtime      ondansetron (ZOFRAN ODT) 4 MG ODT tab Take 1 tablet (4 mg) by mouth every 8 hours as needed for nausea 12 tablet 0    OTHER MEDICAL SUPPLIES       oxybutynin ER (DITROPAN XL) 10 MG 24 hr tablet Take 1 tablet (10 mg) by mouth daily 30 tablet 11    oxybutynin ER (DITROPAN XL) 5 MG 24 hr tablet Take 1 tablet (5 mg) by mouth daily 90 tablet 2    propranolol ER (INDERAL LA) 160 MG 24 hr capsule Take 160 mg by mouth At Bedtime      Specialty Vitamins Products (VITAMINS FOR HAIR) CAPS Take 1 tablet by mouth daily      SUMAtriptan (IMITREX) 50 MG tablet Take 50 mg by mouth at onset of headache      tacrolimus (PROTOPIC) 0.1 % external ointment Apply topically 2 times daily To rashes on the hands as needed. 60 g 3    testosterone (ANDROGEL 1.62 % PUMP) 20.25 MG/ACT gel Place 2 Pump  onto the skin every morning      tretinoin (RETIN-A) 0.05 % external cream Apply topically At Bedtime To the back every other night (increase to nightly if tolerated). Apply a moisturizer on top. 45 g 11    ubrogepant (UBRELVY) 50 MG tablet Take 50 mg by mouth at onset of headache PRN      venlafaxine (EFFEXOR-ER) 150 MG TB24 24 hr tablet Take 300 mg by mouth every morning      vitamin D3 (CHOLECALCIFEROL) 10 MCG (400 UNIT) capsule Take 2 capsules (800 Units) by mouth daily 60 capsule 11     Current Facility-Administered Medications   Medication Dose Route Frequency Provider Last Rate Last Admin    ciprofloxacin (CIPRO) tablet 500 mg  500 mg Oral Once Arden Mercedes MD          Past Medical History:   Patient Active Problem List   Diagnosis    Acute UTI    VERNON positive    Bilateral leg pain    Dysmenorrhea    Elevated CK    Mild intermittent asthma without complication    Narcolepsy and cataplexy    Panic    Urinary urgency    Constipation    Facial pain    Seasonal allergic rhinitis, unspecified trigger    Atopic dermatitis, unspecified type     Past Medical History:   Diagnosis Date    ADHD (attention deficit hyperactivity disorder)     VERNON positive     Anxiety     Asthma     Borderline personality disorder (H)     Concussion     Depression     Fibromyalgia     Gastroesophageal reflux disease with esophagitis     Gender dysphoria in adolescent and adult     Hypermobility of joint     Hypersomnia     Migraine     Mucous retention cyst of maxillary sinus     Obesity     PTSD (post-traumatic stress disorder)     Seasonal allergic rhinitis     Urinary frequency         CC No referring provider

## 2024-06-20 RX ORDER — UREA 10 %
500 LOTION (ML) TOPICAL DAILY
Qty: 90 TABLET | Refills: 1 | Status: SHIPPED | OUTPATIENT
Start: 2024-06-20

## 2024-06-20 RX ORDER — FOLIC ACID 1 MG/1
1 TABLET ORAL DAILY
Qty: 90 TABLET | Refills: 1 | Status: SHIPPED | OUTPATIENT
Start: 2024-06-20

## 2024-06-20 RX ORDER — ISOTRETINOIN 40 MG/1
CAPSULE ORAL
Qty: 30 CAPSULE | Refills: 0 | Status: SHIPPED | OUTPATIENT
Start: 2024-06-20 | End: 2024-08-21 | Stop reason: DRUGHIGH

## 2024-07-01 ENCOUNTER — VIRTUAL VISIT (OUTPATIENT)
Dept: PSYCHOLOGY | Facility: CLINIC | Age: 29
End: 2024-07-01
Payer: COMMERCIAL

## 2024-07-01 DIAGNOSIS — F44.5 PSYCHOGENIC NONEPILEPTIC SEIZURE: ICD-10-CM

## 2024-07-01 DIAGNOSIS — E16.1 HYPERINSULINEMIA: Primary | ICD-10-CM

## 2024-07-01 DIAGNOSIS — G90.A POTS (POSTURAL ORTHOSTATIC TACHYCARDIA SYNDROME): ICD-10-CM

## 2024-07-01 DIAGNOSIS — E16.2 HYPOGLYCEMIA: ICD-10-CM

## 2024-07-01 DIAGNOSIS — J45.20 MILD INTERMITTENT ASTHMA WITHOUT COMPLICATION: ICD-10-CM

## 2024-07-01 DIAGNOSIS — M79.7 FIBROMYALGIA: ICD-10-CM

## 2024-07-01 DIAGNOSIS — F54 PSYCHOLOGICAL FACTORS AFFECTING MEDICAL CONDITION: ICD-10-CM

## 2024-07-01 DIAGNOSIS — G40.109 TEMPORAL LOBE EPILEPSY (H): ICD-10-CM

## 2024-07-01 DIAGNOSIS — G89.29 OTHER CHRONIC PAIN: ICD-10-CM

## 2024-07-01 DIAGNOSIS — M35.9 CONNECTIVE TISSUE DISORDER (H): ICD-10-CM

## 2024-07-01 PROCEDURE — 96158 HLTH BHV IVNTJ INDIV 1ST 30: CPT | Mod: 95 | Performed by: PSYCHOLOGIST

## 2024-07-01 ASSESSMENT — ANXIETY QUESTIONNAIRES
6. BECOMING EASILY ANNOYED OR IRRITABLE: SEVERAL DAYS
2. NOT BEING ABLE TO STOP OR CONTROL WORRYING: NOT AT ALL
5. BEING SO RESTLESS THAT IT IS HARD TO SIT STILL: NOT AT ALL
GAD7 TOTAL SCORE: 4
3. WORRYING TOO MUCH ABOUT DIFFERENT THINGS: SEVERAL DAYS
7. FEELING AFRAID AS IF SOMETHING AWFUL MIGHT HAPPEN: NOT AT ALL
IF YOU CHECKED OFF ANY PROBLEMS ON THIS QUESTIONNAIRE, HOW DIFFICULT HAVE THESE PROBLEMS MADE IT FOR YOU TO DO YOUR WORK, TAKE CARE OF THINGS AT HOME, OR GET ALONG WITH OTHER PEOPLE: SOMEWHAT DIFFICULT
GAD7 TOTAL SCORE: 4
1. FEELING NERVOUS, ANXIOUS, OR ON EDGE: SEVERAL DAYS
8. IF YOU CHECKED OFF ANY PROBLEMS, HOW DIFFICULT HAVE THESE MADE IT FOR YOU TO DO YOUR WORK, TAKE CARE OF THINGS AT HOME, OR GET ALONG WITH OTHER PEOPLE?: SOMEWHAT DIFFICULT
7. FEELING AFRAID AS IF SOMETHING AWFUL MIGHT HAPPEN: NOT AT ALL
4. TROUBLE RELAXING: SEVERAL DAYS

## 2024-07-01 NOTE — NURSING NOTE
Is the patient currently in the state of [place]? YES    Visit mode:VIDEO    If the visit is dropped, the patient can be reconnected by: VIDEO VISIT: Text to cell phone:   Telephone Information:   Mobile [phone]       Will anyone else be joining the visit? No  (If patient encounters technical issues they should call [phone])    How would you like to obtain your AVS? MyChart    Are changes needed to the allergy or medication list? No    Rooming Documentation: Patient declined to complete qnrs with VF.    Reason for visit: RECHKIERSTEN Phillips

## 2024-07-01 NOTE — PROGRESS NOTES
Health Psychology          Sonali Morris, Ph.D.,  [phone]  Anastasiya Pelayo, Ph.D.,  [phone]  Brandie Daniels, Ph.D.,  [phone]  Chin Mai, Ph.D., , [facility] [phone]  Isaura Hatch, Ph.D.,  [phone]  Carla Cunningham, Ph.D., , [facility] [phone]    [facility], 3rd Floor  [address] [place] [address]        Health Behavior Intervention    Length of Session: 28 minutes              Start Time: 3:04 PM                Stop Time: 3:32 PM         : ALBERTO  Type: Follow-up  Session #: 5 (new episode of care)  Referred by: Greta Cannon PA-C    Telemedicine Information    Telemedicine Visit: The patient's condition can be safely assessed and treated via synchronous audio and visual telemedicine encounter.    Reason for Telemedicine Visit: Patient has requested telehealth visit and Patient convenience (e.g. access to timely appointments / distance to available provider)  Originating Site (Patient Location): Patient's home, [place]  Distant Location (provider location): Off-site: Provider's Home Office  Consent:  The patient/guardian has verbally consented to: the potential risks and benefits of telemedicine (video visit) versus in person care; bill my insurance or make self-payment for services provided; and responsibility for payment of non-covered services.   Mode of Communication:  Video Conference via [facility]  As the provider I attest to compliance with applicable laws and regulations related to telemedicine.     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 Was referred for health psychology services by endocrinology provider, Greta Cannon PA-C for assistance with stress in the context of multiple, chronic health conditions during a visit in which he was being evaluated for hypoglycemia.      Session Content:    Update: Patient returning after a 2 month hiatus. They report some significant improvements in functioning and positive updates. States that he is now working at [employer] part time for 8 hours per week. Has also been cleaning and working to increase his endurance by walking home from the train. Has been using a cane that converts into a chair to help with mobility at work and states that he has also been pacing himself. Also now has a gym membership and is getting disability payments.       Promoting Functional Improvement, Minimizing Barriers, and Management/Coping with Medical Condition(s):     Reviewed goals for health behavior intervention from the last visit. He reports that he has successfully been able to get out, being more active, and being more social through work. Patient describes spending their time engaging in more activities that are meaningful and enriching. Also discussed goal from last visit regarding keeping BG stable and he reports that his BG has been running generally in the 90's (per glucometer). Was able to successfully place his CGM sensor. Reports that his most recent epileptic seizure was May 24th. Attributes this to missing a dose of medication Reports that the frequency of his seizures has continued to decrease. Continues to wear medical bracelet with his name and condition and continues to carry his emergency medical kit with an emergency supply of Keppra in it.     Supported patient in processing his thoughts and emotions around his multiple, complex health conditions and the impact they've had on his life and the meaning of the improvements he has experienced.  States that he has also been focusing on expanding his creative activities and that some of their art is on display for purchase at the [employer]. 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       Discussed next steps for health behavior intervention. Patient states that he would like to focus on maintenance for the next few months. Mutually agreed to discontinue health behavior intervention at this time. Patient will continue to receive mental health services through his therapist and psychiatrist and will reach out via [employer] to schedule follow-up if he is in need of additional health psychology services in the future. Patient was congratulated on his success and wished well.           Top 6 values identified include: creativity, kindness, mindfulness, persistence, safety, and skillfulness. They note that creativity, kindness, and mindfulness are the values that he feels most distant from.    Patient Education:  Patient was taught cognitive and behavioral strategies to address lifestyle and behavioral factors associated with medical concerns.  Patient was educated about: health behavior change.    Patient was given handouts on: none today.    Treatment Goals & Progress:    Treatment Goals:   Identifying triggers for and preventing psychogenic seizures (triggers include hypoglycemia, loud noises, physical and emotional overstimulation, and pain)  Increase participation in meaningful and purposeful activities outside of the home  Increase independence  Improve dietary habits  Resume SNAP benefits  Make healthy choices at food shelves  Increase physical activity/movement  BG regulation   Progress: Satisfactory progress    Mental Status Exam:  Appearance: Appropriate   Eye Contact: Good    Orientation: Yes, x4  Behavior/relationship to provider/demeanor: Cooperative, Engaged and Pleasant  Motor Activity: normal or unremarkable  Mood (subjective report): Stable  Affect  (objective appearance): Appropriate/mood congruent, brighter  Speech Rate: Normal  Speech Volume: Normal  Speech Articulation: Normal  Speech Coherence: Normal  Speech Spontaneity: Normal  Thought Content: No active SI, plans, or intent  Thought Process (associations): Logical, Linear and Goal directed  Thought Process (rate): Normal  Abnormal Perception: None  Attention/Concentration: Normal  Memory: Appears grossly intact  Fund of Knowledge: Appears within normal limits  Abstraction:  Normal  Insight: Good  Judgment: Good    Diagnoses:    1. Hyperinsulinemia    2. Hypoglycemia    3. Fibromyalgia    4. Other chronic pain    5. Connective tissue disorder (H24)    6. POTS (postural orthostatic tachycardia syndrome)    7. Mild intermittent asthma without complication    8. Psychological factors affecting medical condition    9. Temporal lobe epilepsy (H)    10. Psychogenic nonepileptic seizure        Medications:    Current Outpatient Medications   Medication Sig Dispense Refill    ACCU-CHEK GUIDE test strip TEST ONCE DAILY OR WITH SYMPTOMS. Follow up visit needed. Call  to schedule. 100 strip 1    albuterol (PROAIR HFA/PROVENTIL HFA/VENTOLIN HFA) 108 (90 Base) MCG/ACT inhaler Inhale 2 puffs into the lungs as needed      azelastine (ASTELIN) 0.1 % nasal spray USE 1 TO 2 SPRAYS IN EACH NOSTRIL 1 TO 2 TIMES DAILY AS NEEDED      baclofen (LIORESAL) 10 MG tablet TAKE 1 TO 2 TABLETS BY MOUTH EVERY NIGHT      buPROPion (WELLBUTRIN XL) 150 MG 24 hr tablet Take 150 mg by mouth every morning      calcipotriene (DOVONOX) 0.005 % external solution Apply 10-15 drops to scalp at nighttime before bed 60 mL 11    cetirizine (ZYRTEC) 10 MG tablet Take 10 mg by mouth daily Pt takes at HS      clindamycin-benzoyl peroxide (BENZACLIN) 1-5 % external gel as needed      clotrimazole (LOTRIMIN) 1 % external cream as needed      CONCERTA 54 MG CR tablet       Continuous Blood Gluc Sensor (FREESTYLE JOSSY 2 SENSOR) MISC Change  SENSOR EVERY 14 DAYS. Follow up visit needed. Call  to schedule. 2 each 3    cyanocobalamin (VITAMIN B-12) 500 MCG tablet Take 1 tablet (500 mcg) by mouth daily 90 tablet 1    diazepam (VALIUM) 5 MG tablet VAGINAL VALIUM SUPPOSITORY 5MG AT NIGHT AND PRIOR TO THERAPY AS NEEDED (Patient taking differently: as needed VAGINAL VALIUM SUPPOSITORY 5MG AT NIGHT AND PRIOR TO THERAPY AS NEEDED) 40 tablet 3    diphenhydrAMINE (BENADRYL) 25 MG capsule Take 25 mg by mouth as needed      EPINEPHrine (ANY BX GENERIC EQUIV) 0.3 MG/0.3ML injection 2-pack Inject into the lateral thigh as needed for life threatening allergic reactions.      estradiol cypionate (DEPO-ESTRADIOL) 5 MG/ML injection Inject into the muscle every 3 months      famotidine (PEPCID) 20 MG tablet Take 20 mg by mouth 2 times daily      fluticasone (FLONASE) 50 MCG/ACT nasal spray Spray 2 sprays into both nostrils 2 times daily 16 g 11    folic acid (FOLVITE) 1 MG tablet Take 1 tablet (1 mg) by mouth daily 90 tablet 1    gabapentin (NEURONTIN) 300 MG capsule Take 300-600 mg by mouth 3 times daily as needed 900 mg at night      HYDROcodone-acetaminophen (NORCO) 5-325 MG tablet Take 1 tablet by mouth every 6 hours as needed for severe pain      ibuprofen (ADVIL/MOTRIN) 200 MG capsule Take by mouth as needed      ISOtretinoin (ACCUTANE) 40 MG capsule Take 40 mg daily with food. Ipledge# [id number] 30 capsule 0    ketoconazole (NIZORAL) 2 % external shampoo APPLY TOPICALLY EVERY OTHER DAY TO WET SCALP AND BACK AND LET SIT FOR 3 TO5 MINUTES 120 mL 8    levETIRAcetam (KEPPRA) 500 MG tablet Take 500 mg by mouth 2 times daily      loratadine (CLARITIN) 10 MG tablet Take 10 mg by mouth every morning      metFORMIN (GLUCOPHAGE) 500 MG tablet Take 1 tablet (500 mg) by mouth daily (with breakfast) 90 tablet 3    Methylphenidate HCl (RITALIN PO) Take 36 mg by mouth 3 times daily (with meals)      methylphenidate HCl ER, OSM, (CONCERTA) 18 MG CR tablet Take 18 mg by  mouth      minoxidil (LONITEN) 2.5 MG tablet Take 1/2 tablet (1.25 mg) in the morning once daily. 45 tablet 3    Minoxidil 5 % FOAM Apply thin layer twice daily to scalp 60 g 11    montelukast (SINGULAIR) 10 MG tablet Take 10 mg by mouth At Bedtime      ondansetron (ZOFRAN ODT) 4 MG ODT tab Take 1 tablet (4 mg) by mouth every 8 hours as needed for nausea 12 tablet 0    OTHER MEDICAL SUPPLIES       oxybutynin ER (DITROPAN XL) 10 MG 24 hr tablet Take 1 tablet (10 mg) by mouth daily 30 tablet 11    oxybutynin ER (DITROPAN XL) 5 MG 24 hr tablet Take 1 tablet (5 mg) by mouth daily 90 tablet 2    propranolol ER (INDERAL LA) 160 MG 24 hr capsule Take 160 mg by mouth At Bedtime      Specialty Vitamins Products (VITAMINS FOR HAIR) CAPS Take 1 tablet by mouth daily      SUMAtriptan (IMITREX) 50 MG tablet Take 50 mg by mouth at onset of headache      tacrolimus (PROTOPIC) 0.1 % external ointment Apply topically 2 times daily To rashes on the hands as needed. 60 g 3    testosterone (ANDROGEL 1.62 % PUMP) 20.25 MG/ACT gel Place 2 Pump onto the skin every morning      tretinoin (RETIN-A) 0.05 % external cream Apply topically At Bedtime To the back every other night (increase to nightly if tolerated). Apply a moisturizer on top. 45 g 11    ubrogepant (UBRELVY) 50 MG tablet Take 50 mg by mouth at onset of headache PRN      venlafaxine (EFFEXOR-ER) 150 MG TB24 24 hr tablet Take 300 mg by mouth every morning      vitamin D3 (CHOLECALCIFEROL) 10 MCG (400 UNIT) capsule Take 2 capsules (800 Units) by mouth daily 60 capsule 11     Current Facility-Administered Medications   Medication Dose Route Frequency Provider Last Rate Last Admin    ciprofloxacin (CIPRO) tablet 500 mg  500 mg Oral Once Arden Mercedes MD           Plan:    Patient discharged from health psychology services at this time. He is invited to return in the future as needed.   Primary care needs and medications are managed by Jessica Cardoso MD at [facility]. Referring provider is Greta Cannon PA-C.   Patient's primary mental health needs are addressed by a mental health team at [facility] (therapist is at this facility) and [facility] (psychiatry provider is at this facility).   Patient was seen for consultation by the [facility] at [facility].    Patient to contact [facility] or other community resources if there was a psychiatric emergency.    Skills taught/interventions used thus far:  Psychoeducation  Values clarification  Motivational interviewing  Willingness and action plan  Mindfulness (breath, body scan)  Health behavior change (BG management)  Committed action    Carla Cunningham, Ph.D., , ABPP  Clinical Health Psychologist  Phone: [phone]   Pager: [phone]

## 2024-07-16 NOTE — PROGRESS NOTES
[facility] Dermatology Note  Encounter Date: Jul 17, 2024  Office Visit    Dermatology Problem List:  1. Nonscarring alopecia consistent with Telogen effluvium   - Current tx: none   - Prior offered: Rogaine 5% foam (not covered by insurance), minoxidil 1.25 mg (low bp exacerbating POTS)   2. Folliculitis  - ketoconazole 2% shampoo, tretinoin cream  3. Irritant hand dermatitis  - Current tx: tacrolimus ointment  - Prior tx: triamcinolone 0.1% ointment bid  4. Referred to genetics- family hx of EDS  5. Hyperhidrosis   - Current tx: oxybutynin 10 mg      ____________________________________________     Assessment & Plan:      #Acne vulgaris face/ folliculitis, back, improving.   Finished 1st month of isotretinoin,       I think Loganne would respond well to isotretinoin.  We discussed at length about how isotretinoin is a know teratogen.  We discussed that there have been reports of depression with suicide in patients on isotretinoin and that there have been reports of an increased risk of IBD on isotretinoin although several meta analyses have contradicted this.  We discussed risk of increased liver markers and lipids. We discussed expectations of dry skin, lips and eyes. He will not share the medication or donate blood while on isotretinoin.     Ipledge #[id number]  Continue isotretinoin 40 mg daily. Recheck labs at next visit.   Cumulative dose: 1200mg      Had a total hysterectomy and salpingectomy 7/26/23.    # Arthralgias, secondary to isotretinoin  Start B12 500 mcg daily  Start 1 mg folic acid daily  Continue Omega 3 fish oil daily with isotretinoin.     June 19, 2024 - July 18, 2024    Procedures Performed:   None      Follow-up: 1 month(s) in-person, or earlier for new or changing lesions    Staff and Scribe:   Scribe Disclosure:   By signing my name below, I, Addie Palmer, attest that this documentation has been prepared under the direction and in the presence of Monserrat Celeste,  MAYCOL.  - Electronically Signed: Addie Palmer 07/16/24       Provider Disclosure:  I agree with above History, Review of Systems, Physical exam and Plan.  I have reviewed the content of the documentation and have edited it as needed. I have personally performed the services documented here and the documentation accurately represents those services and the decisions I have made.      Electronically signed by:  ***    ____________________________________________    CC: No chief complaint on file.    HPI:  Mr. Gregg Maharaj is a(n) 29 year old adult who presents today as a return patient for acne***.     Patient is otherwise feeling well, without additional skin concerns.    Labs Reviewed:  ***       Physical exam:  Vitals: There were no vitals taken for this visit.  GEN: This is a well developed, well-nourished male in no acute distress, in a pleasant mood.    SKIN: {Skin Exam:295055}  - {Skin Exam Derm:288258}  - {Skin Exam Derm:215750}  - {Skin Exam Derm:985447}  - No other lesions of concern on areas examined.       Medications:  Current Outpatient Medications   Medication Sig Dispense Refill    ACCU-CHEK GUIDE test strip TEST ONCE DAILY OR WITH SYMPTOMS. Follow up visit needed. Call  to schedule. 100 strip 1    albuterol (PROAIR HFA/PROVENTIL HFA/VENTOLIN HFA) 108 (90 Base) MCG/ACT inhaler Inhale 2 puffs into the lungs as needed      azelastine (ASTELIN) 0.1 % nasal spray USE 1 TO 2 SPRAYS IN EACH NOSTRIL 1 TO 2 TIMES DAILY AS NEEDED      baclofen (LIORESAL) 10 MG tablet TAKE 1 TO 2 TABLETS BY MOUTH EVERY NIGHT      buPROPion (WELLBUTRIN XL) 150 MG 24 hr tablet Take 150 mg by mouth every morning      calcipotriene (DOVONOX) 0.005 % external solution Apply 10-15 drops to scalp at nighttime before bed 60 mL 11    cetirizine (ZYRTEC) 10 MG tablet Take 10 mg by mouth daily Pt takes at HS      clindamycin-benzoyl peroxide (BENZACLIN) 1-5 % external gel as needed      clotrimazole (LOTRIMIN) 1 % external  cream as needed      CONCERTA 54 MG CR tablet       Continuous Blood Gluc Sensor (FREESTYLE JOSSY 2 SENSOR) MISC Change SENSOR EVERY 14 DAYS. Follow up visit needed. Call  to schedule. 2 each 3    cyanocobalamin (VITAMIN B-12) 500 MCG tablet Take 1 tablet (500 mcg) by mouth daily 90 tablet 1    diazepam (VALIUM) 5 MG tablet VAGINAL VALIUM SUPPOSITORY 5MG AT NIGHT AND PRIOR TO THERAPY AS NEEDED (Patient taking differently: as needed VAGINAL VALIUM SUPPOSITORY 5MG AT NIGHT AND PRIOR TO THERAPY AS NEEDED) 40 tablet 3    diphenhydrAMINE (BENADRYL) 25 MG capsule Take 25 mg by mouth as needed      EPINEPHrine (ANY BX GENERIC EQUIV) 0.3 MG/0.3ML injection 2-pack Inject into the lateral thigh as needed for life threatening allergic reactions.      estradiol cypionate (DEPO-ESTRADIOL) 5 MG/ML injection Inject into the muscle every 3 months      famotidine (PEPCID) 20 MG tablet Take 20 mg by mouth 2 times daily      fluticasone (FLONASE) 50 MCG/ACT nasal spray Spray 2 sprays into both nostrils 2 times daily 16 g 11    folic acid (FOLVITE) 1 MG tablet Take 1 tablet (1 mg) by mouth daily 90 tablet 1    gabapentin (NEURONTIN) 300 MG capsule Take 300-600 mg by mouth 3 times daily as needed 900 mg at night      HYDROcodone-acetaminophen (NORCO) 5-325 MG tablet Take 1 tablet by mouth every 6 hours as needed for severe pain      ibuprofen (ADVIL/MOTRIN) 200 MG capsule Take by mouth as needed      ISOtretinoin (ACCUTANE) 40 MG capsule Take 40 mg daily with food. Ipledge# [id number] 30 capsule 0    ketoconazole (NIZORAL) 2 % external shampoo APPLY TOPICALLY EVERY OTHER DAY TO WET SCALP AND BACK AND LET SIT FOR 3 TO5 MINUTES 120 mL 8    levETIRAcetam (KEPPRA) 500 MG tablet Take 500 mg by mouth 2 times daily      loratadine (CLARITIN) 10 MG tablet Take 10 mg by mouth every morning      metFORMIN (GLUCOPHAGE) 500 MG tablet Take 1 tablet (500 mg) by mouth daily (with breakfast) 90 tablet 3    Methylphenidate HCl (RITALIN PO)  Take 36 mg by mouth 3 times daily (with meals)      methylphenidate HCl ER, OSM, (CONCERTA) 18 MG CR tablet Take 18 mg by mouth      minoxidil (LONITEN) 2.5 MG tablet Take 1/2 tablet (1.25 mg) in the morning once daily. 45 tablet 3    Minoxidil 5 % FOAM Apply thin layer twice daily to scalp 60 g 11    montelukast (SINGULAIR) 10 MG tablet Take 10 mg by mouth At Bedtime      ondansetron (ZOFRAN ODT) 4 MG ODT tab Take 1 tablet (4 mg) by mouth every 8 hours as needed for nausea 12 tablet 0    OTHER MEDICAL SUPPLIES       oxybutynin ER (DITROPAN XL) 10 MG 24 hr tablet Take 1 tablet (10 mg) by mouth daily 30 tablet 11    oxybutynin ER (DITROPAN XL) 5 MG 24 hr tablet Take 1 tablet (5 mg) by mouth daily 90 tablet 2    propranolol ER (INDERAL LA) 160 MG 24 hr capsule Take 160 mg by mouth At Bedtime      Specialty Vitamins Products (VITAMINS FOR HAIR) CAPS Take 1 tablet by mouth daily      SUMAtriptan (IMITREX) 50 MG tablet Take 50 mg by mouth at onset of headache      tacrolimus (PROTOPIC) 0.1 % external ointment Apply topically 2 times daily To rashes on the hands as needed. 60 g 3    testosterone (ANDROGEL 1.62 % PUMP) 20.25 MG/ACT gel Place 2 Pump onto the skin every morning      tretinoin (RETIN-A) 0.05 % external cream Apply topically At Bedtime To the back every other night (increase to nightly if tolerated). Apply a moisturizer on top. 45 g 11    ubrogepant (UBRELVY) 50 MG tablet Take 50 mg by mouth at onset of headache PRN      venlafaxine (EFFEXOR-ER) 150 MG TB24 24 hr tablet Take 300 mg by mouth every morning      vitamin D3 (CHOLECALCIFEROL) 10 MCG (400 UNIT) capsule Take 2 capsules (800 Units) by mouth daily 60 capsule 11     Current Facility-Administered Medications   Medication Dose Route Frequency Provider Last Rate Last Admin    ciprofloxacin (CIPRO) tablet 500 mg  500 mg Oral Once Arden Mercedes MD          Past Medical History:   Patient Active Problem List   Diagnosis    Acute UTI    VERNON positive     Bilateral leg pain    Dysmenorrhea    Elevated CK    Mild intermittent asthma without complication    Narcolepsy and cataplexy    Panic    Urinary urgency    Constipation    Facial pain    Seasonal allergic rhinitis, unspecified trigger    Atopic dermatitis, unspecified type     Past Medical History:   Diagnosis Date    ADHD (attention deficit hyperactivity disorder)     VERNON positive     Anxiety     Asthma     Borderline personality disorder (H)     Concussion     Depression     Fibromyalgia     Gastroesophageal reflux disease with esophagitis     Gender dysphoria in adolescent and adult     Hypermobility of joint     Hypersomnia     Migraine     Mucous retention cyst of maxillary sinus     Obesity     PTSD (post-traumatic stress disorder)     Seasonal allergic rhinitis     Urinary frequency         CC No referring provider defined for this encounter. on close of this encounter.

## 2024-07-17 ENCOUNTER — LAB (OUTPATIENT)
Dept: LAB | Facility: CLINIC | Age: 29
End: 2024-07-17
Payer: COMMERCIAL

## 2024-07-17 ENCOUNTER — OFFICE VISIT (OUTPATIENT)
Dept: DERMATOLOGY | Facility: CLINIC | Age: 29
End: 2024-07-17
Payer: COMMERCIAL

## 2024-07-17 DIAGNOSIS — M79.10 MYALGIA: Primary | ICD-10-CM

## 2024-07-17 DIAGNOSIS — M79.10 MYALGIA: ICD-10-CM

## 2024-07-17 DIAGNOSIS — L70.0 ACNE VULGARIS: ICD-10-CM

## 2024-07-17 DIAGNOSIS — Z79.899 ON ISOTRETINOIN THERAPY: ICD-10-CM

## 2024-07-17 LAB
ALBUMIN SERPL BCG-MCNC: 4.5 G/DL (ref 3.5–5.2)
ALP SERPL-CCNC: 116 U/L (ref 40–150)
ALT SERPL W P-5'-P-CCNC: 18 U/L (ref 0–70)
ANION GAP SERPL CALCULATED.3IONS-SCNC: 14 MMOL/L (ref 7–15)
AST SERPL W P-5'-P-CCNC: 31 U/L (ref 0–45)
BILIRUB DIRECT SERPL-MCNC: <0.2 MG/DL (ref 0–0.3)
BILIRUB SERPL-MCNC: 0.5 MG/DL
BUN SERPL-MCNC: 14 MG/DL (ref 6–20)
CALCIUM SERPL-MCNC: 9.8 MG/DL (ref 8.8–10.4)
CHLORIDE SERPL-SCNC: 100 MMOL/L (ref 98–107)
CHOLEST SERPL-MCNC: 198 MG/DL
CK SERPL-CCNC: 395 U/L (ref 26–308)
CREAT SERPL-MCNC: 0.84 MG/DL (ref 0.51–1.17)
EGFRCR SERPLBLD CKD-EPI 2021: >90 ML/MIN/1.73M2
FASTING STATUS PATIENT QL REPORTED: YES
FASTING STATUS PATIENT QL REPORTED: YES
GLUCOSE SERPL-MCNC: 164 MG/DL (ref 70–99)
HCO3 SERPL-SCNC: 24 MMOL/L (ref 22–29)
HDLC SERPL-MCNC: 34 MG/DL
LDLC SERPL CALC-MCNC: 102 MG/DL
NONHDLC SERPL-MCNC: 164 MG/DL
POTASSIUM SERPL-SCNC: 4.7 MMOL/L (ref 3.4–5.3)
PROT SERPL-MCNC: 7.1 G/DL (ref 6.4–8.3)
SODIUM SERPL-SCNC: 138 MMOL/L (ref 135–145)
TRIGL SERPL-MCNC: 310 MG/DL

## 2024-07-17 PROCEDURE — 82248 BILIRUBIN DIRECT: CPT | Performed by: PATHOLOGY

## 2024-07-17 PROCEDURE — 99214 OFFICE O/P EST MOD 30 MIN: CPT | Performed by: PHYSICIAN ASSISTANT

## 2024-07-17 PROCEDURE — 80061 LIPID PANEL: CPT | Performed by: PATHOLOGY

## 2024-07-17 PROCEDURE — 82550 ASSAY OF CK (CPK): CPT | Performed by: PATHOLOGY

## 2024-07-17 PROCEDURE — 80053 COMPREHEN METABOLIC PANEL: CPT | Performed by: PATHOLOGY

## 2024-07-17 PROCEDURE — 36415 COLL VENOUS BLD VENIPUNCTURE: CPT | Performed by: PATHOLOGY

## 2024-07-17 ASSESSMENT — PAIN SCALES - GENERAL: PAINLEVEL: MODERATE PAIN (4)

## 2024-07-17 NOTE — LETTER
7/17/2024       RE: Gregg Maharaj  [address]  [address]  [place] [address]     Dear Colleague,    Thank you for referring your patient, Gregg Maharaj, to the [facility] [place] at [facility]. Please see a copy of my visit note below.    Dermatology Rooming Note    Gregg Maharaj's goals for this visit include:   Chief Complaint   Patient presents with     Derm Problem     Tattoo concern about healing.      Estella Kennedy, EMT  Clinic Support  [facility]     [phone]    Employed by [employer] [facility] [employer]      [facility] Dermatology Note  Encounter Date: Jul 17, 2024  In office visit.    Dermatology Problem List:  1. Nonscarring alopecia consistent with Telogen effluvium   - Current tx: none   - Prior offered: Rogaine 5% foam (not covered by insurance), minoxidil 1.25 mg (low bp exacerbating POTS)   2. Folliculitis  - ketoconazole 2% shampoo, tretinoin cream  3. Irritant hand dermatitis  - Current tx: tacrolimus ointment  - Prior tx: triamcinolone 0.1% ointment bid  4. Referred to genetics- family hx of EDS  5. Hyperhidrosis   - Current tx: oxybutynin 10 mg      ____________________________________________     Assessment & Plan:      #Acne vulgaris face/ folliculitis, back, improving.   Finished 2st month of isotretinoin,  Recheck labs today., this was non fasting.         We discussed at length about how isotretinoin is a know teratogen.  We discussed that there have been reports of depression with suicide in patients on isotretinoin and that there have been reports of an increased risk of IBD on isotretinoin although several meta analyses have contradicted this.  We discussed risk of increased liver markers and lipids. We discussed expectations of dry skin, lips and eyes. He will not share the medication or donate blood while on isotretinoin.     Ipledge #[id number]  Hold  isotretinoin, due to abnormal CK levels and patient complains of body aches.  Had a normal creatinine level.  Will recheck level in 1 week and consider lower dose isotretinoin at this time such as 10 or 20 mg.  Cumulative dose: 2,400 mg      Had a total hysterectomy and salpingectomy 7/26/23.    # Myalgias, secondary to isotretinoin,  Check CK and basic metabolic panel. CK was elevated at 395 (for females upper level is 192, males 308)  Continue B12 500 mcg daily  Continue 1 mg folic acid daily  Continue Omega 3 fish oil daily.    Hold isotretinoin for now. Will recheck levels in 1 week and hydrate.     # hypertriglyceridemia  -Pt not fasting, will recheck in a week.    # Recent tattoo, upper chest  Healing well. Recommend emollient at least twice daily.   Discussed avoidance of tattoos, unnecessary surgeries and trauma to the skin.     Procedures Performed:   None      Follow-up: 1 month(s) in-person, or earlier for new or changing lesions    Staff:     All risks, benefits and alternatives were discussed with patient.  Patient is in agreement and understands the assessment and plan.  All questions were answered.  Sun Screen Education was given.   Return to Clinic in 1 month or sooner as needed.   Monserrat Celeste PA-C    ____________________________________________    CC: Derm Problem (Tattoo concern about healing. ) and Accutane    HPI:  Mr. Gregg Maharaj is a(n) 29 year old adult who presents today as a return patient for acne. Last seen in dermatology by me on 6/19/24, for a virtual Accutane follow up. He was continued on 40 mg of isotretinoin daily. He was started on supplements to help with body aches.     Today, the patient reports improved acne but worsening body aches.  Notices dryness overall.   Gregg is not sharing the medication or donating blood.     History of pyelonephritis.  Patient is concerned that he is not drinking enough.    Denies headaches, visual changes, epistaxis, abdominal pain, stool  "changes, hematochezia, mood changes, depression or suicidal ideations.     Patient is otherwise feeling well, without additional skin concerns.    Labs Reviewed:  Recent Labs   Lab Test 07/17/24  1013 05/20/24  1145   CHOL 198 162   HDL 34* 37*   * 97   TRIG 310* 138        Last Comprehensive Metabolic Panel:  Sodium   Date Value Ref Range Status   07/17/2024 138 135 - 145 mmol/L Final     Potassium   Date Value Ref Range Status   07/17/2024 4.7 3.4 - 5.3 mmol/L Final     Chloride   Date Value Ref Range Status   07/17/2024 100 98 - 107 mmol/L Final     Carbon Dioxide (CO2)   Date Value Ref Range Status   07/17/2024 24 22 - 29 mmol/L Final     Anion Gap   Date Value Ref Range Status   07/17/2024 14 7 - 15 mmol/L Final     Glucose   Date Value Ref Range Status   07/17/2024 164 (H) 70 - 99 mg/dL Final     GLUCOSE BY METER POCT   Date Value Ref Range Status   08/18/2023 113 (H) 70 - 99 mg/dL Final     Urea Nitrogen   Date Value Ref Range Status   07/17/2024 14.0 6.0 - 20.0 mg/dL Final     Creatinine   Date Value Ref Range Status   07/17/2024 0.84 0.51 - 1.17 mg/dL Final     Comment:     Male and Female  0-2 Months    0.31-0.88 mg/dL  2-12 Months   0.16-0.39 mg/dL  1-2 Years     0.18-0.35 mg/dL  3-4 Years     0.26-0.42 mg/dL  5-6 Years     0.29-0.47 mg/dL  7-8 Years     0.34-0.53 mg/dL  9-10 Years    0.33-0.64 mg/dL  11-12 Years   0.44-0.68 mg/dL  13-14 Years   0.46-0.77 mg/dL    Female  15 Years and older  0.51-0.95 mg/dL    Male  15 Years and older  0.67-1.17 mg/dL         GFR Estimate   Date Value Ref Range Status   07/17/2024 >90 >60 mL/min/1.73m2 Final     Comment:     The generation of the estimated GFR is currently based on binary male or female sex. If the electronic health record information indicates another gender identity or if Legal Sex is recorded as \"Unknown\", GFR estimates are not automatically calculated, and application of GFR equations or a direct GFR measurement should be considered " according to the individual's appropriate clinical context.  eGFR calculated using 2021 CKD-EPI equation.     GFR, ESTIMATED POCT   Date Value Ref Range Status   02/14/2022 >60 >60 mL/min/1.73m2 Final     Comment:     GFR not calculated when sex unspecified or nonbinary.     Calcium   Date Value Ref Range Status   07/17/2024 9.8 8.8 - 10.4 mg/dL Final     Comment:     Reference intervals for this test were updated on 7/16/2024 to reflect our healthy population more accurately. There may be differences in the flagging of prior results with similar values performed with this method. Those prior results can be interpreted in the context of the updated reference intervals.     Bilirubin Total   Date Value Ref Range Status   07/17/2024 0.5 <=1.2 mg/dL Final     Alkaline Phosphatase   Date Value Ref Range Status   07/17/2024 116 40 - 150 U/L Final     Comment:     Female:   0-15 days     U/L  15d-1 year   122-469 U/L  1-10 years   142-335 U/L  10-13 years  129-417 U/L  13-15 years   U/L  15-17 years   U/L  17-19 years  45-87 U/L  19 years and older   U/L      Male:  0-15 days     U/L  15d-1 year   122-469 U/L  1-10 years   142-335 U/L  10-13 years  129-417 U/L  13-15 years  116-468 U/L  15-17 years   U/L  17-19 years   U/L  19 years and older   U/L       ALT   Date Value Ref Range Status   07/17/2024 18 0 - 70 U/L Final     Comment:     Female   All ages       0-50 U/L     Male   0-20 Years     0-50 U/L  20-Unsp. Years 0-70 U/L         AST   Date Value Ref Range Status   07/17/2024 31 0 - 45 U/L Final                  Physical Exam:  Vitals: There were no vitals taken for this visit.  SKIN: Focused exam of the face, back, upper chest and shoulders.  Significant for:  - .Hyperemic macules on the periphery of the face. There are approximately 6 excoriated papules on the back.  There are numerous atrophic macules scattered on the posterior shoulders and back.    - No other  lesions of concern on areas examined.                       Medications:  Current Outpatient Medications   Medication Sig Dispense Refill     ACCU-CHEK GUIDE test strip TEST ONCE DAILY OR WITH SYMPTOMS. Follow up visit needed. Call  to schedule. 100 strip 1     albuterol (PROAIR HFA/PROVENTIL HFA/VENTOLIN HFA) 108 (90 Base) MCG/ACT inhaler Inhale 2 puffs into the lungs as needed       azelastine (ASTELIN) 0.1 % nasal spray USE 1 TO 2 SPRAYS IN EACH NOSTRIL 1 TO 2 TIMES DAILY AS NEEDED       baclofen (LIORESAL) 10 MG tablet TAKE 1 TO 2 TABLETS BY MOUTH EVERY NIGHT       cetirizine (ZYRTEC) 10 MG tablet Take 10 mg by mouth daily Pt takes at HS       CONCERTA 54 MG CR tablet        Continuous Blood Gluc Sensor (FREESTYLE JOSSY 2 SENSOR) MISC Change SENSOR EVERY 14 DAYS. Follow up visit needed. Call  to schedule. 2 each 3     cyanocobalamin (VITAMIN B-12) 500 MCG tablet Take 1 tablet (500 mcg) by mouth daily 90 tablet 1     EPINEPHrine (ANY BX GENERIC EQUIV) 0.3 MG/0.3ML injection 2-pack Inject into the lateral thigh as needed for life threatening allergic reactions.       fluticasone (FLONASE) 50 MCG/ACT nasal spray Spray 2 sprays into both nostrils 2 times daily 16 g 11     folic acid (FOLVITE) 1 MG tablet Take 1 tablet (1 mg) by mouth daily 90 tablet 1     gabapentin (NEURONTIN) 300 MG capsule Take 300-600 mg by mouth 3 times daily as needed 900 mg at night       HYDROcodone-acetaminophen (NORCO) 5-325 MG tablet Take 1 tablet by mouth every 6 hours as needed for severe pain       ibuprofen (ADVIL/MOTRIN) 200 MG capsule Take by mouth as needed       ISOtretinoin (ACCUTANE) 40 MG capsule Take 40 mg daily with food. Ipledge# [id number] 30 capsule 0     levETIRAcetam (KEPPRA) 500 MG tablet Take 500 mg by mouth 2 times daily Patient reports taking 750 mg       metFORMIN (GLUCOPHAGE) 500 MG tablet Take 1 tablet (500 mg) by mouth daily (with breakfast) 90 tablet 3     montelukast (SINGULAIR) 10 MG  tablet Take 10 mg by mouth At Bedtime       OTHER MEDICAL SUPPLIES        oxybutynin ER (DITROPAN XL) 10 MG 24 hr tablet Take 1 tablet (10 mg) by mouth daily 30 tablet 11     propranolol ER (INDERAL LA) 160 MG 24 hr capsule Take 160 mg by mouth At Bedtime       Specialty Vitamins Products (VITAMINS FOR HAIR) CAPS Take 1 tablet by mouth daily       testosterone (ANDROGEL 1.62 % PUMP) 20.25 MG/ACT gel Place 2 Pump onto the skin every morning       ubrogepant (UBRELVY) 50 MG tablet Take 50 mg by mouth at onset of headache PRN       venlafaxine (EFFEXOR-ER) 150 MG TB24 24 hr tablet Take 300 mg by mouth every morning       buPROPion (WELLBUTRIN XL) 150 MG 24 hr tablet Take 150 mg by mouth every morning (Patient not taking: Reported on 7/17/2024)       calcipotriene (DOVONOX) 0.005 % external solution Apply 10-15 drops to scalp at nighttime before bed (Patient not taking: Reported on 7/17/2024) 60 mL 11     clindamycin-benzoyl peroxide (BENZACLIN) 1-5 % external gel as needed (Patient not taking: Reported on 7/17/2024)       clotrimazole (LOTRIMIN) 1 % external cream as needed (Patient not taking: Reported on 7/17/2024)       diazepam (VALIUM) 5 MG tablet VAGINAL VALIUM SUPPOSITORY 5MG AT NIGHT AND PRIOR TO THERAPY AS NEEDED (Patient not taking: Reported on 7/17/2024) 40 tablet 3     diphenhydrAMINE (BENADRYL) 25 MG capsule Take 25 mg by mouth as needed (Patient not taking: Reported on 7/17/2024)       estradiol cypionate (DEPO-ESTRADIOL) 5 MG/ML injection Inject into the muscle every 3 months (Patient not taking: Reported on 7/17/2024)       famotidine (PEPCID) 20 MG tablet Take 20 mg by mouth 2 times daily (Patient not taking: Reported on 7/17/2024)       ketoconazole (NIZORAL) 2 % external shampoo APPLY TOPICALLY EVERY OTHER DAY TO WET SCALP AND BACK AND LET SIT FOR 3 TO5 MINUTES (Patient not taking: Reported on 7/17/2024) 120 mL 8     loratadine (CLARITIN) 10 MG tablet Take 10 mg by mouth every morning (Patient not  taking: Reported on 7/17/2024)       Methylphenidate HCl (RITALIN PO) Take 36 mg by mouth 3 times daily (with meals) (Patient not taking: Reported on 7/17/2024)       methylphenidate HCl ER, OSM, (CONCERTA) 18 MG CR tablet Take 18 mg by mouth (Patient not taking: Reported on 7/17/2024)       minoxidil (LONITEN) 2.5 MG tablet Take 1/2 tablet (1.25 mg) in the morning once daily. (Patient not taking: Reported on 7/17/2024) 45 tablet 3     Minoxidil 5 % FOAM Apply thin layer twice daily to scalp (Patient not taking: Reported on 7/17/2024) 60 g 11     ondansetron (ZOFRAN ODT) 4 MG ODT tab Take 1 tablet (4 mg) by mouth every 8 hours as needed for nausea (Patient not taking: Reported on 7/17/2024) 12 tablet 0     oxybutynin ER (DITROPAN XL) 5 MG 24 hr tablet Take 1 tablet (5 mg) by mouth daily (Patient not taking: Reported on 7/17/2024) 90 tablet 2     SUMAtriptan (IMITREX) 50 MG tablet Take 50 mg by mouth at onset of headache (Patient not taking: Reported on 7/17/2024)       tacrolimus (PROTOPIC) 0.1 % external ointment Apply topically 2 times daily To rashes on the hands as needed. (Patient not taking: Reported on 7/17/2024) 60 g 3     tretinoin (RETIN-A) 0.05 % external cream Apply topically At Bedtime To the back every other night (increase to nightly if tolerated). Apply a moisturizer on top. (Patient not taking: Reported on 7/17/2024) 45 g 11     vitamin D3 (CHOLECALCIFEROL) 10 MCG (400 UNIT) capsule Take 2 capsules (800 Units) by mouth daily (Patient not taking: Reported on 7/17/2024) 60 capsule 11     Current Facility-Administered Medications   Medication Dose Route Frequency Provider Last Rate Last Admin     ciprofloxacin (CIPRO) tablet 500 mg  500 mg Oral Once Arden Mercedes MD          Past Medical History:   Patient Active Problem List   Diagnosis     Acute UTI     VERNON positive     Bilateral leg pain     Dysmenorrhea     Elevated CK     Mild intermittent asthma without complication     Narcolepsy and cataplexy      Panic     Urinary urgency     Constipation     Facial pain     Seasonal allergic rhinitis, unspecified trigger     Atopic dermatitis, unspecified type     Past Medical History:   Diagnosis Date     ADHD (attention deficit hyperactivity disorder)      VERNON positive      Anxiety      Asthma      Borderline personality disorder (H)      Concussion      Depression      Fibromyalgia      Gastroesophageal reflux disease with esophagitis      Gender dysphoria in adolescent and adult      Hypermobility of joint      Hypersomnia      Migraine      Mucous retention cyst of maxillary sinus      Obesity      PTSD (post-traumatic stress disorder)      Seasonal allergic rhinitis      Urinary frequency         CC No referring provider defined for this encounter. on close of this encounter.      Again, thank you for allowing me to participate in the care of your patient.      Sincerely,    Monserrat Celeste PA-C

## 2024-07-17 NOTE — PROGRESS NOTES
Dermatology Rooming Note    Gregg Maharaj's goals for this visit include:   Chief Complaint   Patient presents with    Derm Problem     Tattoo concern about healing.     Estella Kennedy, EMT  Clinic Support  [facility]     [phone]    Employed by [facility] [employer]

## 2024-07-17 NOTE — PROGRESS NOTES
[facility] Dermatology Note  Encounter Date: Jul 17, 2024  In office visit.    Dermatology Problem List:  1. Nonscarring alopecia consistent with Telogen effluvium   - Current tx: none   - Prior offered: Rogaine 5% foam (not covered by insurance), minoxidil 1.25 mg (low bp exacerbating POTS)   2. Folliculitis  - ketoconazole 2% shampoo, tretinoin cream  3. Irritant hand dermatitis  - Current tx: tacrolimus ointment  - Prior tx: triamcinolone 0.1% ointment bid  4. Referred to genetics- family hx of EDS  5. Hyperhidrosis   - Current tx: oxybutynin 10 mg      ____________________________________________     Assessment & Plan:      #Acne vulgaris face/ folliculitis, back, improving.   Finished 2st month of isotretinoin,  Recheck labs today., this was non fasting.         We discussed at length about how isotretinoin is a know teratogen.  We discussed that there have been reports of depression with suicide in patients on isotretinoin and that there have been reports of an increased risk of IBD on isotretinoin although several meta analyses have contradicted this.  We discussed risk of increased liver markers and lipids. We discussed expectations of dry skin, lips and eyes. He will not share the medication or donate blood while on isotretinoin.     Ipledge #[id number]  Hold isotretinoin, due to abnormal CK levels and patient complains of body aches.  Had a normal creatinine level.  Will recheck level in 1 week and consider lower dose isotretinoin at this time such as 10 or 20 mg.  Cumulative dose: 2,400 mg      Had a total hysterectomy and salpingectomy 7/26/23.    # Myalgias, secondary to isotretinoin,  Check CK and basic metabolic panel. CK was elevated at 395 (for females upper level is 192, males 308)  Continue B12 500 mcg daily  Continue 1 mg folic acid daily  Continue Omega 3 fish oil daily.    Hold isotretinoin for now. Will recheck levels in 1 week and hydrate.     # hypertriglyceridemia  -Pt  not fasting, will recheck in a week.    # Recent tattoo, upper chest  Healing well. Recommend emollient at least twice daily.   Discussed avoidance of tattoos, unnecessary surgeries and trauma to the skin.     Procedures Performed:   None      Follow-up: 1 month(s) in-person, or earlier for new or changing lesions    Staff:     All risks, benefits and alternatives were discussed with patient.  Patient is in agreement and understands the assessment and plan.  All questions were answered.  Sun Screen Education was given.   Return to Clinic in 1 month or sooner as needed.   Monserrat Celeste PA-C    ____________________________________________    CC: Derm Problem (Tattoo concern about healing. ) and Accutane    HPI:  Mr. Gregg Maharaj is a(n) 29 year old adult who presents today as a return patient for acne. Last seen in dermatology by me on 6/19/24, for a virtual Accutane follow up. He was continued on 40 mg of isotretinoin daily. He was started on supplements to help with body aches.     Today, the patient reports improved acne but worsening body aches.  Notices dryness overall.   Gregg is not sharing the medication or donating blood.     History of pyelonephritis.  Patient is concerned that he is not drinking enough.    Denies headaches, visual changes, epistaxis, abdominal pain, stool changes, hematochezia, mood changes, depression or suicidal ideations.     Patient is otherwise feeling well, without additional skin concerns.    Labs Reviewed:  Recent Labs   Lab Test 07/17/24  1013 05/20/24  1145   CHOL 198 162   HDL 34* 37*   * 97   TRIG 310* 138        Last Comprehensive Metabolic Panel:  Sodium   Date Value Ref Range Status   07/17/2024 138 135 - 145 mmol/L Final     Potassium   Date Value Ref Range Status   07/17/2024 4.7 3.4 - 5.3 mmol/L Final     Chloride   Date Value Ref Range Status   07/17/2024 100 98 - 107 mmol/L Final     Carbon Dioxide (CO2)   Date Value Ref Range Status   07/17/2024 24  "22 - 29 mmol/L Final     Anion Gap   Date Value Ref Range Status   07/17/2024 14 7 - 15 mmol/L Final     Glucose   Date Value Ref Range Status   07/17/2024 164 (H) 70 - 99 mg/dL Final     GLUCOSE BY METER POCT   Date Value Ref Range Status   08/18/2023 113 (H) 70 - 99 mg/dL Final     Urea Nitrogen   Date Value Ref Range Status   07/17/2024 14.0 6.0 - 20.0 mg/dL Final     Creatinine   Date Value Ref Range Status   07/17/2024 0.84 0.51 - 1.17 mg/dL Final     Comment:     Male and Female  0-2 Months    0.31-0.88 mg/dL  2-12 Months   0.16-0.39 mg/dL  1-2 Years     0.18-0.35 mg/dL  3-4 Years     0.26-0.42 mg/dL  5-6 Years     0.29-0.47 mg/dL  7-8 Years     0.34-0.53 mg/dL  9-10 Years    0.33-0.64 mg/dL  11-12 Years   0.44-0.68 mg/dL  13-14 Years   0.46-0.77 mg/dL    Female  15 Years and older  0.51-0.95 mg/dL    Male  15 Years and older  0.67-1.17 mg/dL         GFR Estimate   Date Value Ref Range Status   07/17/2024 >90 >60 mL/min/1.73m2 Final     Comment:     The generation of the estimated GFR is currently based on binary male or female sex. If the electronic health record information indicates another gender identity or if Legal Sex is recorded as \"Unknown\", GFR estimates are not automatically calculated, and application of GFR equations or a direct GFR measurement should be considered according to the individual's appropriate clinical context.  eGFR calculated using 2021 CKD-EPI equation.     GFR, ESTIMATED POCT   Date Value Ref Range Status   02/14/2022 >60 >60 mL/min/1.73m2 Final     Comment:     GFR not calculated when sex unspecified or nonbinary.     Calcium   Date Value Ref Range Status   07/17/2024 9.8 8.8 - 10.4 mg/dL Final     Comment:     Reference intervals for this test were updated on 7/16/2024 to reflect our healthy population more accurately. There may be differences in the flagging of prior results with similar values performed with this method. Those prior results can be interpreted in the context of " the updated reference intervals.     Bilirubin Total   Date Value Ref Range Status   07/17/2024 0.5 <=1.2 mg/dL Final     Alkaline Phosphatase   Date Value Ref Range Status   07/17/2024 116 40 - 150 U/L Final     Comment:     Female:   0-15 days     U/L  15d-1 year   122-469 U/L  1-10 years   142-335 U/L  10-13 years  129-417 U/L  13-15 years   U/L  15-17 years   U/L  17-19 years  45-87 U/L  19 years and older   U/L      Male:  0-15 days     U/L  15d-1 year   122-469 U/L  1-10 years   142-335 U/L  10-13 years  129-417 U/L  13-15 years  116-468 U/L  15-17 years   U/L  17-19 years   U/L  19 years and older   U/L       ALT   Date Value Ref Range Status   07/17/2024 18 0 - 70 U/L Final     Comment:     Female   All ages       0-50 U/L     Male   0-20 Years     0-50 U/L  20-Unsp. Years 0-70 U/L         AST   Date Value Ref Range Status   07/17/2024 31 0 - 45 U/L Final                  Physical Exam:  Vitals: There were no vitals taken for this visit.  SKIN: Focused exam of the face, back, upper chest and shoulders.  Significant for:  - .Hyperemic macules on the periphery of the face. There are approximately 6 excoriated papules on the back.  There are numerous atrophic macules scattered on the posterior shoulders and back.    - No other lesions of concern on areas examined.                       Medications:  Current Outpatient Medications   Medication Sig Dispense Refill    ACCU-CHEK GUIDE test strip TEST ONCE DAILY OR WITH SYMPTOMS. Follow up visit needed. Call  to schedule. 100 strip 1    albuterol (PROAIR HFA/PROVENTIL HFA/VENTOLIN HFA) 108 (90 Base) MCG/ACT inhaler Inhale 2 puffs into the lungs as needed      azelastine (ASTELIN) 0.1 % nasal spray USE 1 TO 2 SPRAYS IN EACH NOSTRIL 1 TO 2 TIMES DAILY AS NEEDED      baclofen (LIORESAL) 10 MG tablet TAKE 1 TO 2 TABLETS BY MOUTH EVERY NIGHT      cetirizine (ZYRTEC) 10 MG tablet Take 10 mg by mouth daily Pt  takes at HS      CONCERTA 54 MG CR tablet       Continuous Blood Gluc Sensor (FREESTYLE JOSSY 2 SENSOR) MISC Change SENSOR EVERY 14 DAYS. Follow up visit needed. Call  to schedule. 2 each 3    cyanocobalamin (VITAMIN B-12) 500 MCG tablet Take 1 tablet (500 mcg) by mouth daily 90 tablet 1    EPINEPHrine (ANY BX GENERIC EQUIV) 0.3 MG/0.3ML injection 2-pack Inject into the lateral thigh as needed for life threatening allergic reactions.      fluticasone (FLONASE) 50 MCG/ACT nasal spray Spray 2 sprays into both nostrils 2 times daily 16 g 11    folic acid (FOLVITE) 1 MG tablet Take 1 tablet (1 mg) by mouth daily 90 tablet 1    gabapentin (NEURONTIN) 300 MG capsule Take 300-600 mg by mouth 3 times daily as needed 900 mg at night      HYDROcodone-acetaminophen (NORCO) 5-325 MG tablet Take 1 tablet by mouth every 6 hours as needed for severe pain      ibuprofen (ADVIL/MOTRIN) 200 MG capsule Take by mouth as needed      ISOtretinoin (ACCUTANE) 40 MG capsule Take 40 mg daily with food. Ipledge# [id number] 30 capsule 0    levETIRAcetam (KEPPRA) 500 MG tablet Take 500 mg by mouth 2 times daily Patient reports taking 750 mg      metFORMIN (GLUCOPHAGE) 500 MG tablet Take 1 tablet (500 mg) by mouth daily (with breakfast) 90 tablet 3    montelukast (SINGULAIR) 10 MG tablet Take 10 mg by mouth At Bedtime      OTHER MEDICAL SUPPLIES       oxybutynin ER (DITROPAN XL) 10 MG 24 hr tablet Take 1 tablet (10 mg) by mouth daily 30 tablet 11    propranolol ER (INDERAL LA) 160 MG 24 hr capsule Take 160 mg by mouth At Bedtime      Specialty Vitamins Products (VITAMINS FOR HAIR) CAPS Take 1 tablet by mouth daily      testosterone (ANDROGEL 1.62 % PUMP) 20.25 MG/ACT gel Place 2 Pump onto the skin every morning      ubrogepant (UBRELVY) 50 MG tablet Take 50 mg by mouth at onset of headache PRN      venlafaxine (EFFEXOR-ER) 150 MG TB24 24 hr tablet Take 300 mg by mouth every morning      buPROPion (WELLBUTRIN XL) 150 MG 24 hr tablet  Take 150 mg by mouth every morning (Patient not taking: Reported on 7/17/2024)      calcipotriene (DOVONOX) 0.005 % external solution Apply 10-15 drops to scalp at nighttime before bed (Patient not taking: Reported on 7/17/2024) 60 mL 11    clindamycin-benzoyl peroxide (BENZACLIN) 1-5 % external gel as needed (Patient not taking: Reported on 7/17/2024)      clotrimazole (LOTRIMIN) 1 % external cream as needed (Patient not taking: Reported on 7/17/2024)      diazepam (VALIUM) 5 MG tablet VAGINAL VALIUM SUPPOSITORY 5MG AT NIGHT AND PRIOR TO THERAPY AS NEEDED (Patient not taking: Reported on 7/17/2024) 40 tablet 3    diphenhydrAMINE (BENADRYL) 25 MG capsule Take 25 mg by mouth as needed (Patient not taking: Reported on 7/17/2024)      estradiol cypionate (DEPO-ESTRADIOL) 5 MG/ML injection Inject into the muscle every 3 months (Patient not taking: Reported on 7/17/2024)      famotidine (PEPCID) 20 MG tablet Take 20 mg by mouth 2 times daily (Patient not taking: Reported on 7/17/2024)      ketoconazole (NIZORAL) 2 % external shampoo APPLY TOPICALLY EVERY OTHER DAY TO WET SCALP AND BACK AND LET SIT FOR 3 TO5 MINUTES (Patient not taking: Reported on 7/17/2024) 120 mL 8    loratadine (CLARITIN) 10 MG tablet Take 10 mg by mouth every morning (Patient not taking: Reported on 7/17/2024)      Methylphenidate HCl (RITALIN PO) Take 36 mg by mouth 3 times daily (with meals) (Patient not taking: Reported on 7/17/2024)      methylphenidate HCl ER, OSM, (CONCERTA) 18 MG CR tablet Take 18 mg by mouth (Patient not taking: Reported on 7/17/2024)      minoxidil (LONITEN) 2.5 MG tablet Take 1/2 tablet (1.25 mg) in the morning once daily. (Patient not taking: Reported on 7/17/2024) 45 tablet 3    Minoxidil 5 % FOAM Apply thin layer twice daily to scalp (Patient not taking: Reported on 7/17/2024) 60 g 11    ondansetron (ZOFRAN ODT) 4 MG ODT tab Take 1 tablet (4 mg) by mouth every 8 hours as needed for nausea (Patient not taking: Reported on  7/17/2024) 12 tablet 0    oxybutynin ER (DITROPAN XL) 5 MG 24 hr tablet Take 1 tablet (5 mg) by mouth daily (Patient not taking: Reported on 7/17/2024) 90 tablet 2    SUMAtriptan (IMITREX) 50 MG tablet Take 50 mg by mouth at onset of headache (Patient not taking: Reported on 7/17/2024)      tacrolimus (PROTOPIC) 0.1 % external ointment Apply topically 2 times daily To rashes on the hands as needed. (Patient not taking: Reported on 7/17/2024) 60 g 3    tretinoin (RETIN-A) 0.05 % external cream Apply topically At Bedtime To the back every other night (increase to nightly if tolerated). Apply a moisturizer on top. (Patient not taking: Reported on 7/17/2024) 45 g 11    vitamin D3 (CHOLECALCIFEROL) 10 MCG (400 UNIT) capsule Take 2 capsules (800 Units) by mouth daily (Patient not taking: Reported on 7/17/2024) 60 capsule 11     Current Facility-Administered Medications   Medication Dose Route Frequency Provider Last Rate Last Admin    ciprofloxacin (CIPRO) tablet 500 mg  500 mg Oral Once Arden Mercedes MD          Past Medical History:   Patient Active Problem List   Diagnosis    Acute UTI    VERNON positive    Bilateral leg pain    Dysmenorrhea    Elevated CK    Mild intermittent asthma without complication    Narcolepsy and cataplexy    Panic    Urinary urgency    Constipation    Facial pain    Seasonal allergic rhinitis, unspecified trigger    Atopic dermatitis, unspecified type     Past Medical History:   Diagnosis Date    ADHD (attention deficit hyperactivity disorder)     VERNON positive     Anxiety     Asthma     Borderline personality disorder (H)     Concussion     Depression     Fibromyalgia     Gastroesophageal reflux disease with esophagitis     Gender dysphoria in adolescent and adult     Hypermobility of joint     Hypersomnia     Migraine     Mucous retention cyst of maxillary sinus     Obesity     PTSD (post-traumatic stress disorder)     Seasonal allergic rhinitis     Urinary frequency         CC No referring  provider defined for this encounter. on close of this encounter.

## 2024-07-18 NOTE — PATIENT INSTRUCTIONS
Isotretinoin for Acne    Isotretinoin is a retinoid medication that is taken by mouth to treat severe nodular acne. Typically, it is used once other acne treatments have not worked, such as oral antibiotics. Usually isotretinoin is taken for 4 to 6 months, although the length of treatment can vary from person to person.  While most patient s acne improves and may even clear with this medication, in 20% of patients acne can come back. This requires additional acne treatment or even a second cycle of isotretinoin.    How should I take isotretinoin?    Isotretinoin dosing is weight-based and should be taken exactly as prescribed.   If you miss a dose, skip that dose. Do not take 2 doses at the same time.   Take with food to help with absorption.  All instructions in the iPLEDGE program packet (www.[employer]) that was provided must be followed (see below).  You will get a 30 day supply of isotretinoin at a time. It cannot be automatically refilled.  Make certain that you have been given enough medication to last 30 days as pharmacists are unable to refill or make changes within a month.  You must return to your dermatologist every month to make sure you are not having any serious side effects from isotretinoin. For female patients, this visit will always include a monthly pregnancy test. Other laboratory studies, including liver function tests, cholesterol and triglycerides, must also be conducted before and during treatment.    What should I avoid while taking isotretinoin?    Do not get pregnant from one month prior or 1 month following taking any isotretinoin.  Do not donate blood while take isotretinoin or until 1 months after coming off the medication.  Do not have cosmetic procedures to smooth your skin, including waxing, dermabrasion, or laser procedures, while taking this medication and for at least 6 months after you stop.   Do not share isotretinoin with any other people. It can cause birth defects  and other serious health problems.  Do not use any other acne medications, including medicated washes, cleansers, creams or antibiotic pills during your treatment with isotretinoin unless expressly directed to by your dermatologist.      Initiating isotretinoin & the iPLEDGE Program    The iPLEDGE Program is a strict, government-required program to prevent females from becoming pregnant while on isotretinoin. All females and males must participate. Note: Your provider must follow this program and cannot change any of the requirements.  Before starting isotretinoin, your provider will talk to you about the safe use of this medication and you will need to sign consent forms in order to receive treatment.   If you fail to keep appointments, you will be unable to get your prescription filled.  For male patients and women of non-childbearing age: There is no waiting period. Once laboratory tests are done, treatment can start.  For females of childbearing age:     You must be on two specific forms of birth control before starting isotretinoin. Your provider must get 2 negative pregnancy tests, 30 days apart, before you can proceed with the medication. The second pregnancy test must be obtained within 5 days of the menstrual cycle. If you choose not to be sexually active during treatment, you still must have the 2 negative pregnancy tests.  You must answer a series of questions either online or by phone every month.  Monthly prescriptions must be filled within 7 days of the visit to the dermatologist.  It is important that you notify your physician well before the 7th day if there are any unforeseen delays, such as  prior authorizations.   For more information, visit: [email or web address]    What are the possible side effects of isotretinoin? What should I do?  Most side effects from isotretinoin are mild and can be easily improved with simple remedies.  Others are more concerning.  Dry  skin and eyes, chapped lips and dry nose that may lead to nosebleeds.   Dry Skin: Apply sensitive skin moisturizers to dry skin at least two times a day. You may need sunscreen (SPF 30) in the morning and to reapply when outside.   Dry Eyes: Use saline eye drops or artificial tears.   Dry Nose/Nosebleeds: Use saline nasal spray and petrolatum jelly into the nose, during the day and at bedtime. To stop nosebleeds, hold pressure.  If this does not work, call your dermatologist.   Chapped lips: apply petrolatum-based lip balms routinely. Avoid anything  medicated.  Contact your dermatologist for excessive dryness, cracks, tenderness or pain.    Increased blood fats and cholesterol (usually in patients with personal or family history of cholesterol or triglyceride problems).   Vision problems affecting your ability to see in the dark and drive at night.  Bone, muscle and tendon aches can occur. Additional stretching before and after activities may help relieve aches.  If you are otherwise healthy, consider the use of ibuprofen or naproxen.  If you are unsure if you can use these pain medications, ask your doctor first. Also, call your doctor if you experience severe back pain, joint pain, or a broken bone  Changes in mood, including anxiety, depressive symptoms or suicidal thoughts which may or may not be temporary.  Notify your doctor if your or a family member have suffered from these conditions or if you have any concerns during treatment.  Serious birth defects or miscarriage can occur while taking this medication and for one month after taking your last dose of isotretinoin. You must not get pregnant while taking isotretinoin. Once the medication is out of your system, 30 days, there is no effect on the baby.  Increased pressure in the brain. Call your doctor right away if you experience bad headache, blurred vision, dizziness, nausea or vomiting, or seizures.  Skin rash - call your doctor right away if you develop  any rashes or blisters on the skin.  Liver damage - call your doctor right away if you experience severe stomach, chest or bowel pain, painful swallowing, diarrhea, blood in your stool, yellowing of your skin or eyes, or dark urine.  Gastrointestinal bleeding. If you experience unusual abdominal pain or red or black/tarry stools, call your doctor immediately. You should also notify your doctor if you or a family member has a history of ulcerative colitis or Crohns disease.  Worsening acne. Mild worsening of acne can occur in the first few weeks of using isotreinoin. If your acne is getting significantly worse, call your doctor. This may require temporarily stopping the isotretinoin and possibly adding other medications.    Contributing SPD members:  Cassie Merritt M.D., Gustabo Torre M.D., & Mimi Mallory M.D.  Committee Reviewers: Alfie Bran M.D., & Niyah Kendrick M.D.  Expert Reviewer: Luciano Cardozo M.D.

## 2024-07-22 ENCOUNTER — LAB (OUTPATIENT)
Dept: LAB | Facility: CLINIC | Age: 29
End: 2024-07-22
Payer: COMMERCIAL

## 2024-07-22 DIAGNOSIS — L70.0 ACNE VULGARIS: ICD-10-CM

## 2024-07-22 DIAGNOSIS — M79.10 MYALGIA: ICD-10-CM

## 2024-07-22 DIAGNOSIS — Z79.899 ON ISOTRETINOIN THERAPY: ICD-10-CM

## 2024-07-22 LAB
ALBUMIN SERPL BCG-MCNC: 4.3 G/DL (ref 3.5–5.2)
ALP SERPL-CCNC: 107 U/L (ref 40–150)
ALT SERPL W P-5'-P-CCNC: 12 U/L (ref 0–70)
ANION GAP SERPL CALCULATED.3IONS-SCNC: 9 MMOL/L (ref 7–15)
AST SERPL W P-5'-P-CCNC: 20 U/L (ref 0–45)
BILIRUB SERPL-MCNC: 0.4 MG/DL
BUN SERPL-MCNC: 7.6 MG/DL (ref 6–20)
CALCIUM SERPL-MCNC: 9.3 MG/DL (ref 8.8–10.4)
CHLORIDE SERPL-SCNC: 103 MMOL/L (ref 98–107)
CHOLEST SERPL-MCNC: 179 MG/DL
CK SERPL-CCNC: 87 U/L (ref 26–308)
CREAT SERPL-MCNC: 0.92 MG/DL (ref 0.51–1.17)
EGFRCR SERPLBLD CKD-EPI 2021: >90 ML/MIN/1.73M2
FASTING STATUS PATIENT QL REPORTED: YES
FASTING STATUS PATIENT QL REPORTED: YES
GLUCOSE SERPL-MCNC: 98 MG/DL (ref 70–99)
HCO3 SERPL-SCNC: 28 MMOL/L (ref 22–29)
HDLC SERPL-MCNC: 35 MG/DL
LDLC SERPL CALC-MCNC: 104 MG/DL
NONHDLC SERPL-MCNC: 144 MG/DL
POTASSIUM SERPL-SCNC: 4.4 MMOL/L (ref 3.4–5.3)
PROT SERPL-MCNC: 6.8 G/DL (ref 6.4–8.3)
SODIUM SERPL-SCNC: 140 MMOL/L (ref 135–145)
TRIGL SERPL-MCNC: 199 MG/DL

## 2024-07-22 PROCEDURE — 36415 COLL VENOUS BLD VENIPUNCTURE: CPT | Performed by: PATHOLOGY

## 2024-07-22 PROCEDURE — 82550 ASSAY OF CK (CPK): CPT | Performed by: PATHOLOGY

## 2024-07-22 PROCEDURE — 80053 COMPREHEN METABOLIC PANEL: CPT | Performed by: PATHOLOGY

## 2024-07-22 PROCEDURE — 80061 LIPID PANEL: CPT | Performed by: PATHOLOGY

## 2024-08-02 ENCOUNTER — TELEPHONE (OUTPATIENT)
Dept: ENDOCRINOLOGY | Facility: CLINIC | Age: 29
End: 2024-08-02
Payer: COMMERCIAL

## 2024-08-02 NOTE — PROGRESS NOTES
Tay for 08/02/24 11:06 AM: Pets are family too message sent  Adalgisa JOSEF Maldonado  Outcome for 08/02/24 12:51 PM: Left Voicemail   Adalgisa JOSEF Maldonado  Outcome for 08/02/24 4:26 PM: Per patient, will upload device before appointment- pt placed new sensor.  Adalgisa JOSEF Maldonado  Outcome for 08/05/24 8:40 AM: Data obtained via [employer] website  Padmini Dailey LPN     Start time: 1339  End time: 1400  Provider location: off site- home  Patient location: off site- home.    HPI:   Gregg is a very pleasant 28 yo transgender male here for follow up of hypoglycemia.  He also has a history of fibromyalgia, connective tissue disorder, asthma, narcolepsy, pelvic floor dysfunction, POTS, and remote history of an eating disorder.  When I first met Gregg, he complained of symptoms of irritability, shakiness, weakness with blood glucose readings in the 50-60's and feels better after treatment.  Recent laboratory results are reassuring and he does not give a history concerning for insulinoma (no life-threatening hypoglycemia, loss of consciousness, etc). Negative screen for adrenal insufficiency and secondary hypothyroidism.  He does have fasting hyperinsulinemia, which may be an early sign of pre-diabetes.  Patient is seen by his primary care provider, Dr. Jessica Cardoso and has had a recent thorough evaluation by Dr. Heredia, endocrinologist through [facility], for the same issue. Please refer to my previous note for details.      Recently, dealing with kidney infection- second one.  Wonders if it might have something to do with being on testosterone.    Was off metformin for a while- glucose was closer to the 110's.  Started having spikes and dips again.  Started back up on it. 500 mg daily.  No side effects.  Main issue is placing the sensor on his arm.     Since his last visit, he has continued working on more dietary modifications.  He has lowered soda intake.  Average of 1 can per day.  More days he has none.  His glucose has been more  stable, but he still feels low in the 80s on occasion.  He has started seeing Carla Cunningham, health psychologist, and this has been helpful.      Current diet:    Gregg has cut back his soda consumption significantly.   He is trying to eat a healthier diet, but says that this is quite limited by his finances/SNAP benefits. Tries to have high protein- usually tries to make a meal with a lot of meat at least once a day.  Meat or eggs.  High protein snacks such as protein bars, jerky, nuts, bowl of rice with seasoning, soy sauce, eggs.  Limited vegetables, spinach. Takes fiber gummies. Tries to eat whole wheat instead of white. Wake up: breakfast corn dogs or pop tarts, tatiana bars. Something easy to make, as he has low energy.      Very limited activity due to connective tissue disorder.     We reviewed his recent glucose data below. He has been doing both finger sticks and using the miranda sensor.  He likes having both.  Recent glucose is as follows:               Past Medical History:   Diagnosis Date    ADHD (attention deficit hyperactivity disorder)     VERNON positive     Anxiety     Asthma     Borderline personality disorder (H)     Concussion     Depression     Fibromyalgia     Gastroesophageal reflux disease with esophagitis     Gender dysphoria in adolescent and adult     Hypermobility of joint     Hypersomnia     Migraine     Mucous retention cyst of maxillary sinus     Obesity     PTSD (post-traumatic stress disorder)     Seasonal allergic rhinitis     Urinary frequency        Past Surgical History:   Procedure Laterality Date    Direct microlaryngoscopy with biopsy  06/29/2022    REVISE SCAR BREAST Bilateral 8/18/2023    Procedure: REVISION, SCAR, BREAST - s/p bilateral transgender mastectomy (incisions, dogears, nipple revision);  Surgeon: Anita Grant MD;  Location: [facility] OR    TRANSGENDER MASTECTOMY Bilateral 9/26/2022    Procedure: MASTECTOMY, BILATERAL, SIMPLE, nipple grafts. OnQ;  Surgeon: Juanita  Anita Hook MD;  Location: [facility] OR       Family History   Problem Relation Age of Onset    Anesthesia Reaction No family hx of     Deep Vein Thrombosis (DVT) No family hx of     Skin Cancer No family hx of    Mom- has hypoglycemia, pituitary tumors.    Grandfather- type 2 DM.         Social History   Social Hx: Lives with partner, Delta.  On disability currently.     Socioeconomic History    Marital status: Single   Tobacco Use    Smoking status: Never    Smokeless tobacco: Never       Current Outpatient Medications   Medication Sig Dispense Refill    ACCU-CHEK GUIDE test strip TEST ONCE DAILY OR WITH SYMPTOMS. Follow up visit needed. Call  to schedule. 100 strip 1    albuterol (PROAIR HFA/PROVENTIL HFA/VENTOLIN HFA) 108 (90 Base) MCG/ACT inhaler Inhale 2 puffs into the lungs as needed      azelastine (ASTELIN) 0.1 % nasal spray USE 1 TO 2 SPRAYS IN EACH NOSTRIL 1 TO 2 TIMES DAILY AS NEEDED      baclofen (LIORESAL) 10 MG tablet TAKE 1 TO 2 TABLETS BY MOUTH EVERY NIGHT      buPROPion (WELLBUTRIN XL) 150 MG 24 hr tablet Take 150 mg by mouth every morning (Patient not taking: Reported on 7/17/2024)      calcipotriene (DOVONOX) 0.005 % external solution Apply 10-15 drops to scalp at nighttime before bed (Patient not taking: Reported on 7/17/2024) 60 mL 11    cetirizine (ZYRTEC) 10 MG tablet Take 10 mg by mouth daily Pt takes at HS      clindamycin-benzoyl peroxide (BENZACLIN) 1-5 % external gel as needed (Patient not taking: Reported on 7/17/2024)      clotrimazole (LOTRIMIN) 1 % external cream as needed (Patient not taking: Reported on 7/17/2024)      CONCERTA 54 MG CR tablet       Continuous Blood Gluc Sensor (FREESTYLE JOSSY 2 SENSOR) MISC Change SENSOR EVERY 14 DAYS. Follow up visit needed. Call  to schedule. 2 each 3    cyanocobalamin (VITAMIN B-12) 500 MCG tablet Take 1 tablet (500 mcg) by mouth daily 90 tablet 1    diazepam (VALIUM) 5 MG tablet VAGINAL VALIUM SUPPOSITORY 5MG AT NIGHT AND  PRIOR TO THERAPY AS NEEDED (Patient not taking: Reported on 7/17/2024) 40 tablet 3    diphenhydrAMINE (BENADRYL) 25 MG capsule Take 25 mg by mouth as needed (Patient not taking: Reported on 7/17/2024)      EPINEPHrine (ANY BX GENERIC EQUIV) 0.3 MG/0.3ML injection 2-pack Inject into the lateral thigh as needed for life threatening allergic reactions.      estradiol cypionate (DEPO-ESTRADIOL) 5 MG/ML injection Inject into the muscle every 3 months (Patient not taking: Reported on 7/17/2024)      famotidine (PEPCID) 20 MG tablet Take 20 mg by mouth 2 times daily (Patient not taking: Reported on 7/17/2024)      fluticasone (FLONASE) 50 MCG/ACT nasal spray Spray 2 sprays into both nostrils 2 times daily 16 g 11    folic acid (FOLVITE) 1 MG tablet Take 1 tablet (1 mg) by mouth daily 90 tablet 1    gabapentin (NEURONTIN) 300 MG capsule Take 300-600 mg by mouth 3 times daily as needed 900 mg at night      HYDROcodone-acetaminophen (NORCO) 5-325 MG tablet Take 1 tablet by mouth every 6 hours as needed for severe pain      ibuprofen (ADVIL/MOTRIN) 200 MG capsule Take by mouth as needed      ISOtretinoin (ACCUTANE) 40 MG capsule Take 40 mg daily with food. Ipledge# [id number] 30 capsule 0    ketoconazole (NIZORAL) 2 % external shampoo APPLY TOPICALLY EVERY OTHER DAY TO WET SCALP AND BACK AND LET SIT FOR 3 TO5 MINUTES (Patient not taking: Reported on 7/17/2024) 120 mL 8    levETIRAcetam (KEPPRA) 500 MG tablet Take 500 mg by mouth 2 times daily Patient reports taking 750 mg      loratadine (CLARITIN) 10 MG tablet Take 10 mg by mouth every morning (Patient not taking: Reported on 7/17/2024)      metFORMIN (GLUCOPHAGE) 500 MG tablet Take 1 tablet (500 mg) by mouth daily (with breakfast) 90 tablet 3    Methylphenidate HCl (RITALIN PO) Take 36 mg by mouth 3 times daily (with meals) (Patient not taking: Reported on 7/17/2024)      methylphenidate HCl ER, OSM, (CONCERTA) 18 MG CR tablet Take 18 mg by mouth (Patient not taking:  Reported on 7/17/2024)      minoxidil (LONITEN) 2.5 MG tablet Take 1/2 tablet (1.25 mg) in the morning once daily. (Patient not taking: Reported on 7/17/2024) 45 tablet 3    Minoxidil 5 % FOAM Apply thin layer twice daily to scalp (Patient not taking: Reported on 7/17/2024) 60 g 11    montelukast (SINGULAIR) 10 MG tablet Take 10 mg by mouth At Bedtime      ondansetron (ZOFRAN ODT) 4 MG ODT tab Take 1 tablet (4 mg) by mouth every 8 hours as needed for nausea (Patient not taking: Reported on 7/17/2024) 12 tablet 0    OTHER MEDICAL SUPPLIES       oxybutynin ER (DITROPAN XL) 10 MG 24 hr tablet Take 1 tablet (10 mg) by mouth daily 30 tablet 11    oxybutynin ER (DITROPAN XL) 5 MG 24 hr tablet Take 1 tablet (5 mg) by mouth daily (Patient not taking: Reported on 7/17/2024) 90 tablet 2    propranolol ER (INDERAL LA) 160 MG 24 hr capsule Take 160 mg by mouth At Bedtime      Specialty Vitamins Products (VITAMINS FOR HAIR) CAPS Take 1 tablet by mouth daily      SUMAtriptan (IMITREX) 50 MG tablet Take 50 mg by mouth at onset of headache (Patient not taking: Reported on 7/17/2024)      tacrolimus (PROTOPIC) 0.1 % external ointment Apply topically 2 times daily To rashes on the hands as needed. (Patient not taking: Reported on 7/17/2024) 60 g 3    testosterone (ANDROGEL 1.62 % PUMP) 20.25 MG/ACT gel Place 2 Pump onto the skin every morning      tretinoin (RETIN-A) 0.05 % external cream Apply topically At Bedtime To the back every other night (increase to nightly if tolerated). Apply a moisturizer on top. (Patient not taking: Reported on 7/17/2024) 45 g 11    ubrogepant (UBRELVY) 50 MG tablet Take 50 mg by mouth at onset of headache PRN      venlafaxine (EFFEXOR-ER) 150 MG TB24 24 hr tablet Take 300 mg by mouth every morning      vitamin D3 (CHOLECALCIFEROL) 10 MCG (400 UNIT) capsule Take 2 capsules (800 Units) by mouth daily (Patient not taking: Reported on 7/17/2024) 60 capsule 11     Current Facility-Administered Medications  "  Medication Dose Route Frequency Provider Last Rate Last Admin    ciprofloxacin (CIPRO) tablet 500 mg  500 mg Oral Once Arden Mercedes MD              Allergies   Allergen Reactions    Capsaicin Anaphylaxis    Capsicum Annuum Extract & Derivative (Bell Pepper) [Capsicum] Anaphylaxis    Food Shortness Of Breath     Red chili peppers (harissa paste)    No Clinical Screening - See Comments Anaphylaxis, Dermatitis, Hives, Itching, Rash and Shortness Of Breath     Cotton seed oil  Cotton seed oil    Adhesive Tape      Other reaction(s): Atopic Dermatitis ALSO DERMABOND - PT HAD HIVES  Other reaction(s): Atopic Dermatitis  Other reaction(s): Atopic Dermatitis  ALSO PT REACTED TO ABSORBABLE SUTURES - BODY REJECTION, PUS, SWELLING AND REDNESS.  Allergic to adhesive tape that is not silicone.     Benadryl [Diphenhydramine] Hallucination     Auditory hallucination from benadryl toxicity    Fexofenadine Hives    Kiwi     Other Environmental Allergy Hives     Cotton seed oil    Tomato     Metoclopramide Anxiety     Makes pt feel claustrophobic    Oxycodone Other (See Comments)     Dysphoria and insomnia       Physical Exam  There were no vitals taken for this visit.  GENERAL: healthy, alert and no distress  RESP: no audible wheeze, cough, or visible cyanosis.  No visible retractions or increased work of breathing.  Able to speak fully in complete sentences.  PSYCH: mentation appears normal, affect normal/bright, judgement and insight intact, normal speech and appearance well-groomed    RESULTS  No results found for: \"A1C\", \"HEMOGLOBINA1\"    TSH   Date Value Ref Range Status   04/12/2023 1.92 0.30 - 4.20 uIU/mL Final   12/15/2022 1.31 0.30 - 4.20 uIU/mL Final       ALT   Date Value Ref Range Status   07/22/2024 12 0 - 70 U/L Final     Comment:     Female   All ages       0-50 U/L     Male   0-20 Years     0-50 U/L  20-Unsp. Years 0-70 U/L       07/17/2024 18 0 - 70 U/L Final     Comment:     Female   All ages       0-50 U/L " "    Male   0-20 Years     0-50 U/L  20-Unsp. Years 0-70 U/L       ]    Recent Labs   Lab Test 07/22/24  1135 07/17/24  1013   CHOL 179 198   HDL 35* 34*   * 102*   TRIG 199* 310*       Lab Results   Component Value Date     07/22/2024      Lab Results   Component Value Date    POTASSIUM 4.4 07/22/2024     Lab Results   Component Value Date    CHLORIDE 103 07/22/2024     Lab Results   Component Value Date    JASMINE 9.3 07/22/2024     Lab Results   Component Value Date    CO2 28 07/22/2024     Lab Results   Component Value Date    BUN 7.6 07/22/2024     Lab Results   Component Value Date    CR 0.92 07/22/2024       GFR Estimate   Date Value Ref Range Status   07/22/2024 >90 >60 mL/min/1.73m2 Final     Comment:     The generation of the estimated GFR is currently based on binary male or female sex. If the electronic health record information indicates another gender identity or if Legal Sex is recorded as \"Unknown\", GFR estimates are not automatically calculated, and application of GFR equations or a direct GFR measurement should be considered according to the individual's appropriate clinical context.  eGFR calculated using 2021 CKD-EPI equation.   07/17/2024 >90 >60 mL/min/1.73m2 Final     Comment:     The generation of the estimated GFR is currently based on binary male or female sex. If the electronic health record information indicates another gender identity or if Legal Sex is recorded as \"Unknown\", GFR estimates are not automatically calculated, and application of GFR equations or a direct GFR measurement should be considered according to the individual's appropriate clinical context.  eGFR calculated using 2021 CKD-EPI equation.   10/27/2023 >90 >60 mL/min/1.73m2 Final     Comment:     The generation of the estimated GFR is currently based on binary male or female sex. If the electronic health record information indicates another gender identity or if Legal Sex is recorded as \"Unknown\", GFR estimates " "are not automatically calculated, and application of GFR equations or a direct GFR measurement should be considered according to the individual's appropriate clinical context.     GFR, ESTIMATED POCT   Date Value Ref Range Status   02/14/2022 >60 >60 mL/min/1.73m2 Final     Comment:     GFR not calculated when sex unspecified or nonbinary.     No results found for: \"GFRESTBLACK\"    No results found for: \"MICROL\"  No results found for: \"MICROALBUMIN\"  Lab Results   Component Value Date    CPEPT 4.4 04/12/2023       No results found for: \"B12\"] On supplement.     Most recent eye exam date: : Not Found     FIB-4 Calculation: 0.61 at 10/27/2023  6:21 PM  Calculated from:  SGOT/AST: 29 U/L at 10/27/2023  6:21 PM  SGPT/ALT: 24 U/L at 10/27/2023  6:21 PM  Platelets: 252 10e3/uL at 10/27/2023  6:21 PM  Age: 28 years  A value of FIB-4 below 1.30 is considered as low risk for advanced fibrosis; a value of FIB-4 over 2.67 is considered as high risk for advanced fibrosis; and FIB-4 values between 1.30 and 2.67 are considered as intermediate risk of advanced fibrosis.        Labs: Care Everywhere:          (ABNORMAL) GLUCOSE, FASTING (03/13/2023 8:20 AM CDT)  Lab Results - (ABNORMAL) GLUCOSE, FASTING (03/13/2023 8:20 AM CDT)  Component Value Ref Range Test Method Analysis Time Performed At Pathologist Signature   GLUCOSE 105 (H) 70 - 99 mg/dL   03/13/2023 8:43 AM CDT [facility]       (ABNORMAL) INSULIN (03/13/2023 8:20 AM CDT)  Lab Results - (ABNORMAL) INSULIN (03/13/2023 8:20 AM CDT)  Component Value Ref Range Test Method Analysis Time Performed At Pathologist Signature   INSULIN,SERUM 29.8 (H) 2.6 - 24.9 uIU/mL   03/13/2023 4:53 PM CDT [facility]       Lab Results - (ABNORMAL) INSULIN (03/13/2023 8:20 AM CDT)  Specimen (Source) Anatomical Location / Laterality Collection Method / Volume Collection Time Received Time   Blood BLOOD SPECIMEN / Unknown Venipuncture / Unknown " 03/13/2023 8:20 AM CDT 03/13/2023 8:24 AM CDT     Lab Results - (ABNORMAL) INSULIN (03/13/2023 8:20 AM CDT)  Narrative   [facility] - 03/13/2023 4:53 PM CDT    Biotin supplements may cause clinically significant interference for this test assay.  If interference is suspected, it is strongly recommended that biotin is discontinued for at least one week prior to retesting.          HEMOGLOBIN A1C SCREENING (03/13/2023 8:20 AM CDT)  Lab Results - HEMOGLOBIN A1C SCREENING (03/13/2023 8:20 AM CDT)  Component Value Ref Range Test Method Analysis Time Performed At [facility]   HEMOGLOBIN A1C SCREENING 5.4 <=6.4 %   03[facility]13/2023 4:28 PM [facility]          LC THYROXINE (T4) FREE DIRECT (03/13/2023 8:20 AM CDT)  Lab Results -  THYROXINE (T4) FREE DIRECT (03/13/2023 8:20 AM CDT)  Component Value Ref Range Test Method Analysis Time Performed At [facility]   T4Free(Direct) 1.22 0.82 - 1.77 ng/dL   03/15/2023 12:08 PM [facility]             TSH (03/13/2023 8:20 AM CDT)  Lab Results -  TSH (03/13/2023 8:20 AM CDT)  Component Value Ref Range Test Method Analysis Time Performed At [facility]   TSH 2.580 0.450 - 4.500 uIU/mL   03/15/2023 12:08 PM CDT [facility]               Assessment/Plan:   1.  Hypoglycemia- Patient had been bothered by symptoms of irritability, shakiness, weakness with blood glucose readings in the 50-60's and felt better after treatment.  Recent laboratory results are reassuring and he does not give a history concerning for insulinoma (no life-threatening hypoglycemia, loss of consciousness, etc). Negative screen for adrenal insufficiency and secondary hypothyroidism.  He does have fasting hyperinsulinemia, which may be an early sign of pre-diabetes.  Repeat labs were reassuring. It is likely the high refined  carbohydrates were stimulating a signficant insulin response, which in turn was driving his glucose lower. Glucose is more stable on Metformin, so will continue at low dose of 500 mg daily.  Gregg is comfortable with this.  This has been helpful I minimizing glucose excursions and increasing insulin sensitivity.  Currently on B12 supplement, but would plan to check vitamin b12 if he remains on this long term and goes of supplement.  As patient is doing well on current therapy, will refer back to PCP for ongoing management.      2.  Follow up- Prn. Patient has met criteria for clinic graduation with stable glucose control and will follow up with with PCP for diabetes management.  Alternatively, we would be happy to see him again, as needed, if questions or concerns arise, including rising hemoglobin A1c, worsening hypoglycemia, or need for adjustment.     32 minutes spent on the date of the encounter doing chart review, review of test results, review of continuous glucose sensor, interpretation of glucose data, patient visit and documentation, counseling/coordination of care, and discussion of follow up plan for worsening hyper and hypoglycemia.  The patient understood and is satisfied with today's visit.        Greta Cannon PA-C, MPAS   [facility]  Department of Medicine  Division of Endocrinology and Diabetes

## 2024-08-02 NOTE — TELEPHONE ENCOUNTER
Called patient and left voicemail. Patient has an appointment on  8/5/24 . Need patient to upload their Magi device to site for provider to review prior to their appointment.  Adalgisa Maldonado MA

## 2024-08-02 NOTE — TELEPHONE ENCOUNTER
Spoke with patient. Pt states they placed new sensor. They will upload miranda this weekend. Adalgisa Maldonado CMA on 8/2/2024 at 4:27 PM

## 2024-08-05 ENCOUNTER — VIRTUAL VISIT (OUTPATIENT)
Dept: ENDOCRINOLOGY | Facility: CLINIC | Age: 29
End: 2024-08-05
Payer: COMMERCIAL

## 2024-08-05 DIAGNOSIS — E16.2 HYPOGLYCEMIA: ICD-10-CM

## 2024-08-05 PROCEDURE — 99214 OFFICE O/P EST MOD 30 MIN: CPT | Mod: 95 | Performed by: PHYSICIAN ASSISTANT

## 2024-08-05 NOTE — LETTER
8/5/2024       RE: Gregg Maharaj  [address]  [address]  [place] [address]     Dear Colleague,    Thank you for referring your patient, Gregg Maharaj, to the [facility] ENDOCRINOLOG[facility]IC [facility]. Please see a copy of my visit note below.    fOutcome for 08/02/24 11:06 AM: Playdate App message sent  Adalgisa Maldonado MA  Outcome for 08/02/24 12:51 PM: Left Voicemail   Adalgisa Maldonado MA  Outcome for 08/02/24 4:26 PM: Per patient, will upload device before appointment- pt placed new sensor.  Adalgisa Maldonado MA  Outcome for 08/05/24 8:40 AM: Data obtained via [employer] website  Padmini Dailey LPN     Start time: 1339  End time: 1400  Provider location: off site- home  Patient location: off site- home.    HPI:   Gregg is a very pleasant 28 yo transgender male here for follow up of hypoglycemia.  He also has a history of fibromyalgia, connective tissue disorder, asthma, narcolepsy, pelvic floor dysfunction, POTS, and remote history of an eating disorder.  When I first met Gregg, he complained of symptoms of irritability, shakiness, weakness with blood glucose readings in the 50-60's and feels better after treatment.  Recent laboratory results are reassuring and he does not give a history concerning for insulinoma (no life-threatening hypoglycemia, loss of consciousness, etc). Negative screen for adrenal insufficiency and secondary hypothyroidism.  He does have fasting hyperinsulinemia, which may be an early sign of pre-diabetes.  Patient is seen by his primary care provider, Dr. Jessica Cardoso and has had a recent thorough evaluation by Dr. Heredia, endocrinologist through [facility], for the same issue. Please refer to my previous note for details.      Recently, dealing with kidney infection- second one.  Wonders if it might have something to do with being on testosterone.    Was off metformin for a while- glucose was closer to the 110's.   Started having spikes and dips again.  Started back up on it. 500 mg daily.  No side effects.  Main issue is placing the sensor on his arm.     Since his last visit, he has continued working on more dietary modifications.  He has lowered soda intake.  Average of 1 can per day.  More days he has none.  His glucose has been more stable, but he still feels low in the 80s on occasion.  He has started seeing Carla Cunningham, health psychologist, and this has been helpful.      Current diet:    Gregg has cut back his soda consumption significantly.   He is trying to eat a healthier diet, but says that this is quite limited by his finances/SNAP benefits. Tries to have high protein- usually tries to make a meal with a lot of meat at least once a day.  Meat or eggs.  High protein snacks such as protein bars, jerky, nuts, bowl of rice with seasoning, soy sauce, eggs.  Limited vegetables, spinach. Takes fiber gummies. Tries to eat whole wheat instead of white. Wake up: breakfast corn dogs or pop tarts, tatiana bars. Something easy to make, as he has low energy.      Very limited activity due to connective tissue disorder.     We reviewed his recent glucose data below. He has been doing both finger sticks and using the miranda sensor.  He likes having both.  Recent glucose is as follows:               Past Medical History:   Diagnosis Date     ADHD (attention deficit hyperactivity disorder)      VERNON positive      Anxiety      Asthma      Borderline personality disorder (H)      Concussion      Depression      Fibromyalgia      Gastroesophageal reflux disease with esophagitis      Gender dysphoria in adolescent and adult      Hypermobility of joint      Hypersomnia      Migraine      Mucous retention cyst of maxillary sinus      Obesity      PTSD (post-traumatic stress disorder)      Seasonal allergic rhinitis      Urinary frequency        Past Surgical History:   Procedure Laterality Date     Direct microlaryngoscopy with biopsy   06/29/2022     REVISE SCAR BREAST Bilateral 8/18/2023    Procedure: REVISION, SCAR, BREAST - s/p bilateral transgender mastectomy (incisions, dogears, nipple revision);  Surgeon: Anita Grant MD;  Location: [facility] OR     TRANSGENDER MASTECTOMY Bilateral 9/26/2022    Procedure: MASTECTOMY, BILATERAL, SIMPLE, nipple grafts. OnQ;  Surgeon: Anita Grant MD;  Location: [facility] OR       Family History   Problem Relation Age of Onset     Anesthesia Reaction No family hx of      Deep Vein Thrombosis (DVT) No family hx of      Skin Cancer No family hx of    Mom- has hypoglycemia, pituitary tumors.    Grandfather- type 2 DM.         Social History   Social Hx: Lives with partner, Delta.  On disability currently.     Socioeconomic History     Marital status: Single   Tobacco Use     Smoking status: Never     Smokeless tobacco: Never       Current Outpatient Medications   Medication Sig Dispense Refill     ACCU-CHEK GUIDE test strip TEST ONCE DAILY OR WITH SYMPTOMS. Follow up visit needed. Call  to schedule. 100 strip 1     albuterol (PROAIR HFA/PROVENTIL HFA/VENTOLIN HFA) 108 (90 Base) MCG/ACT inhaler Inhale 2 puffs into the lungs as needed       azelastine (ASTELIN) 0.1 % nasal spray USE 1 TO 2 SPRAYS IN EACH NOSTRIL 1 TO 2 TIMES DAILY AS NEEDED       baclofen (LIORESAL) 10 MG tablet TAKE 1 TO 2 TABLETS BY MOUTH EVERY NIGHT       buPROPion (WELLBUTRIN XL) 150 MG 24 hr tablet Take 150 mg by mouth every morning (Patient not taking: Reported on 7/17/2024)       calcipotriene (DOVONOX) 0.005 % external solution Apply 10-15 drops to scalp at nighttime before bed (Patient not taking: Reported on 7/17/2024) 60 mL 11     cetirizine (ZYRTEC) 10 MG tablet Take 10 mg by mouth daily Pt takes at HS       clindamycin-benzoyl peroxide (BENZACLIN) 1-5 % external gel as needed (Patient not taking: Reported on 7/17/2024)       clotrimazole (LOTRIMIN) 1 % external cream as needed (Patient not taking: Reported on  7/17/2024)       CONCERTA 54 MG CR tablet        Continuous Blood Gluc Sensor (FREESTYLE JOSSY 2 SENSOR) MISC Change SENSOR EVERY 14 DAYS. Follow up visit needed. Call  to schedule. 2 each 3     cyanocobalamin (VITAMIN B-12) 500 MCG tablet Take 1 tablet (500 mcg) by mouth daily 90 tablet 1     diazepam (VALIUM) 5 MG tablet VAGINAL VALIUM SUPPOSITORY 5MG AT NIGHT AND PRIOR TO THERAPY AS NEEDED (Patient not taking: Reported on 7/17/2024) 40 tablet 3     diphenhydrAMINE (BENADRYL) 25 MG capsule Take 25 mg by mouth as needed (Patient not taking: Reported on 7/17/2024)       EPINEPHrine (ANY BX GENERIC EQUIV) 0.3 MG/0.3ML injection 2-pack Inject into the lateral thigh as needed for life threatening allergic reactions.       estradiol cypionate (DEPO-ESTRADIOL) 5 MG/ML injection Inject into the muscle every 3 months (Patient not taking: Reported on 7/17/2024)       famotidine (PEPCID) 20 MG tablet Take 20 mg by mouth 2 times daily (Patient not taking: Reported on 7/17/2024)       fluticasone (FLONASE) 50 MCG/ACT nasal spray Spray 2 sprays into both nostrils 2 times daily 16 g 11     folic acid (FOLVITE) 1 MG tablet Take 1 tablet (1 mg) by mouth daily 90 tablet 1     gabapentin (NEURONTIN) 300 MG capsule Take 300-600 mg by mouth 3 times daily as needed 900 mg at night       HYDROcodone-acetaminophen (NORCO) 5-325 MG tablet Take 1 tablet by mouth every 6 hours as needed for severe pain       ibuprofen (ADVIL/MOTRIN) 200 MG capsule Take by mouth as needed       ISOtretinoin (ACCUTANE) 40 MG capsule Take 40 mg daily with food. Ipledge# [id number] 30 capsule 0     ketoconazole (NIZORAL) 2 % external shampoo APPLY TOPICALLY EVERY OTHER DAY TO WET SCALP AND BACK AND LET SIT FOR 3 TO5 MINUTES (Patient not taking: Reported on 7/17/2024) 120 mL 8     levETIRAcetam (KEPPRA) 500 MG tablet Take 500 mg by mouth 2 times daily Patient reports taking 750 mg       loratadine (CLARITIN) 10 MG tablet Take 10 mg by mouth every  morning (Patient not taking: Reported on 7/17/2024)       metFORMIN (GLUCOPHAGE) 500 MG tablet Take 1 tablet (500 mg) by mouth daily (with breakfast) 90 tablet 3     Methylphenidate HCl (RITALIN PO) Take 36 mg by mouth 3 times daily (with meals) (Patient not taking: Reported on 7/17/2024)       methylphenidate HCl ER, OSM, (CONCERTA) 18 MG CR tablet Take 18 mg by mouth (Patient not taking: Reported on 7/17/2024)       minoxidil (LONITEN) 2.5 MG tablet Take 1/2 tablet (1.25 mg) in the morning once daily. (Patient not taking: Reported on 7/17/2024) 45 tablet 3     Minoxidil 5 % FOAM Apply thin layer twice daily to scalp (Patient not taking: Reported on 7/17/2024) 60 g 11     montelukast (SINGULAIR) 10 MG tablet Take 10 mg by mouth At Bedtime       ondansetron (ZOFRAN ODT) 4 MG ODT tab Take 1 tablet (4 mg) by mouth every 8 hours as needed for nausea (Patient not taking: Reported on 7/17/2024) 12 tablet 0     OTHER MEDICAL SUPPLIES        oxybutynin ER (DITROPAN XL) 10 MG 24 hr tablet Take 1 tablet (10 mg) by mouth daily 30 tablet 11     oxybutynin ER (DITROPAN XL) 5 MG 24 hr tablet Take 1 tablet (5 mg) by mouth daily (Patient not taking: Reported on 7/17/2024) 90 tablet 2     propranolol ER (INDERAL LA) 160 MG 24 hr capsule Take 160 mg by mouth At Bedtime       Specialty Vitamins Products (VITAMINS FOR HAIR) CAPS Take 1 tablet by mouth daily       SUMAtriptan (IMITREX) 50 MG tablet Take 50 mg by mouth at onset of headache (Patient not taking: Reported on 7/17/2024)       tacrolimus (PROTOPIC) 0.1 % external ointment Apply topically 2 times daily To rashes on the hands as needed. (Patient not taking: Reported on 7/17/2024) 60 g 3     testosterone (ANDROGEL 1.62 % PUMP) 20.25 MG/ACT gel Place 2 Pump onto the skin every morning       tretinoin (RETIN-A) 0.05 % external cream Apply topically At Bedtime To the back every other night (increase to nightly if tolerated). Apply a moisturizer on top. (Patient not taking:  Reported on 7/17/2024) 45 g 11     ubrogepant (UBRELVY) 50 MG tablet Take 50 mg by mouth at onset of headache PRN       venlafaxine (EFFEXOR-ER) 150 MG TB24 24 hr tablet Take 300 mg by mouth every morning       vitamin D3 (CHOLECALCIFEROL) 10 MCG (400 UNIT) capsule Take 2 capsules (800 Units) by mouth daily (Patient not taking: Reported on 7/17/2024) 60 capsule 11     Current Facility-Administered Medications   Medication Dose Route Frequency Provider Last Rate Last Admin     ciprofloxacin (CIPRO) tablet 500 mg  500 mg Oral Once Arden Mercdees MD              Allergies   Allergen Reactions     Capsaicin Anaphylaxis     Capsicum Annuum Extract & Derivative (Bell Pepper) [Capsicum] Anaphylaxis     Food Shortness Of Breath     Red chili peppers (harissa paste)     No Clinical Screening - See Comments Anaphylaxis, Dermatitis, Hives, Itching, Rash and Shortness Of Breath     Cotton seed oil  Cotton seed oil     Adhesive Tape      Other reaction(s): Atopic Dermatitis ALSO DERMABOND - PT HAD HIVES  Other reaction(s): Atopic Dermatitis  Other reaction(s): Atopic Dermatitis  ALSO PT REACTED TO ABSORBABLE SUTURES - BODY REJECTION, PUS, SWELLING AND REDNESS.  Allergic to adhesive tape that is not silicone.      Benadryl [Diphenhydramine] Hallucination     Auditory hallucination from benadryl toxicity     Fexofenadine Hives     Kiwi      Other Environmental Allergy Hives     Cotton seed oil     Tomato      Metoclopramide Anxiety     Makes pt feel claustrophobic     Oxycodone Other (See Comments)     Dysphoria and insomnia       Physical Exam  There were no vitals taken for this visit.  GENERAL: healthy, alert and no distress  RESP: no audible wheeze, cough, or visible cyanosis.  No visible retractions or increased work of breathing.  Able to speak fully in complete sentences.  PSYCH: mentation appears normal, affect normal/bright, judgement and insight intact, normal speech and appearance well-groomed    RESULTS  No results  "found for: \"A1C\", \"HEMOGLOBINA1\"    TSH   Date Value Ref Range Status   04/12/2023 1.92 0.30 - 4.20 uIU/mL Final   12/15/2022 1.31 0.30 - 4.20 uIU/mL Final       ALT   Date Value Ref Range Status   07/22/2024 12 0 - 70 U/L Final     Comment:     Female   All ages       0-50 U/L     Male   0-20 Years     0-50 U/L  20-Unsp. Years 0-70 U/L       07/17/2024 18 0 - 70 U/L Final     Comment:     Female   All ages       0-50 U/L     Male   0-20 Years     0-50 U/L  20-Unsp. Years 0-70 U/L       ]    Recent Labs   Lab Test 07/22/24  1135 07/17/24  1013   CHOL 179 198   HDL 35* 34*   * 102*   TRIG 199* 310*       Lab Results   Component Value Date     07/22/2024      Lab Results   Component Value Date    POTASSIUM 4.4 07/22/2024     Lab Results   Component Value Date    CHLORIDE 103 07/22/2024     Lab Results   Component Value Date    JASMINE 9.3 07/22/2024     Lab Results   Component Value Date    CO2 28 07/22/2024     Lab Results   Component Value Date    BUN 7.6 07/22/2024     Lab Results   Component Value Date    CR 0.92 07/22/2024       GFR Estimate   Date Value Ref Range Status   07/22/2024 >90 >60 mL/min/1.73m2 Final     Comment:     The generation of the estimated GFR is currently based on binary male or female sex. If the electronic health record information indicates another gender identity or if Legal Sex is recorded as \"Unknown\", GFR estimates are not automatically calculated, and application of GFR equations or a direct GFR measurement should be considered according to the individual's appropriate clinical context.  eGFR calculated using 2021 CKD-EPI equation.   07/17/2024 >90 >60 mL/min/1.73m2 Final     Comment:     The generation of the estimated GFR is currently based on binary male or female sex. If the electronic health record information indicates another gender identity or if Legal Sex is recorded as \"Unknown\", GFR estimates are not automatically calculated, and application of GFR equations or a " "direct GFR measurement should be considered according to the individual's appropriate clinical context.  eGFR calculated using 2021 CKD-EPI equation.   10/27/2023 >90 >60 mL/min/1.73m2 Final     Comment:     The generation of the estimated GFR is currently based on binary male or female sex. If the electronic health record information indicates another gender identity or if Legal Sex is recorded as \"Unknown\", GFR estimates are not automatically calculated, and application of GFR equations or a direct GFR measurement should be considered according to the individual's appropriate clinical context.     GFR, ESTIMATED POCT   Date Value Ref Range Status   02/14/2022 >60 >60 mL/min/1.73m2 Final     Comment:     GFR not calculated when sex unspecified or nonbinary.     No results found for: \"GFRESTBLACK\"    No results found for: \"MICROL\"  No results found for: \"MICROALBUMIN\"  Lab Results   Component Value Date    CPEPT 4.4 04/12/2023       No results found for: \"B12\"] On supplement.     Most recent eye exam date: : Not Found     FIB-4 Calculation: 0.61 at 10/27/2023  6:21 PM  Calculated from:  SGOT/AST: 29 U/L at 10/27/2023  6:21 PM  SGPT/ALT: 24 U/L at 10/27/2023  6:21 PM  Platelets: 252 10e3/uL at 10/27/2023  6:21 PM  Age: 28 years  A value of FIB-4 below 1.30 is considered as low risk for advanced fibrosis; a value of FIB-4 over 2.67 is considered as high risk for advanced fibrosis; and FIB-4 values between 1.30 and 2.67 are considered as intermediate risk of advanced fibrosis.        Labs: Care Everywhere:          (ABNORMAL) GLUCOSE, FASTING (03/13/2023 8:20 AM CDT)  Lab Results - (ABNORMAL) GLUCOSE, FASTING (03/13/2023 8:20 AM CDT)  Component Value Ref Range Test Method Analysis Time Performed At [facility]   GLUCOSE 105 (H) 70 - 99 mg/dL   03/13/2023 8:43 AM CDT [facility]       (ABNORMAL) INSULIN (03/13/2023 8:20 AM CDT)  Lab Results - (ABNORMAL) INSULIN (03/13/2023 8:20 AM " CDT)  Component Value Ref Range Test Method Analysis Time Performed At [facility]   INSULIN,SERUM 29.8 (H) 2.6 - 24.9 uIU/mL   03/13/2023 4:53 PM CDT [facility]       Lab Results - (ABNORMAL) INSULIN (03/13/2023 8:20 AM CDT)  Specimen (Source) Anatomical Location / Laterality Collection Method / Volume Collection Time Received Time   Blood BLOOD SPECIMEN / Unknown Venipuncture / Unknown 03/13/2023 8:20 AM CDT 03/13/2023 8:24 AM CDT     Lab Results - (ABNORMAL) INSULIN (03/13/2023 8:20 AM CDT)  Narrative   [facility] - 03/13/2023 4:53 PM CDT    Biotin supplements may cause clinically significant interference for this test assay.  If interference is suspected, it is strongly recommended that biotin is discontinued for at least one week prior to retesting.          HEMOGLOBIN A1C SCREENING (03/13/2023 8:20 AM CDT)  Lab Results - HEMOGLOBIN A1C SCREENING (03/13/2023 8:20 AM CDT)  Component Value Ref Range Test Method Analysis Time Performed At [facility]   HEMOGLOBIN A1C SCREENING 5.4 <=6.4 %   03/13/2023 4:28 PM CDT [facility]          LC THYROXINE (T4) FREE DIRECT (03/13/2023 8:20 AM CDT)  Lab Results - LC THYROXINE (T4) FREE DIRECT (03/13/2023 8:20 AM CDT)  Component Value Ref Range Test Method Analysis Time Performed At [facility]   T4Free(Direct) 1.22 0.82 - 1.77 ng/dL   03/15/2023 12:08 PM CDT [facility]            LC TSH (03/13/2023 8:20 AM CDT)  Lab Results -  TSH (03/13/2023 8:20 AM CDT)  Component Value Ref Range Test Method Analysis Time Performed At [facility]   TSH 2.580 0.450 - 4.500 uIU/mL   03/15/2023 12:08 PM CDT [facility]               Assessment/Plan:   1.  Hypoglycemia- Patient had been bothered by symptoms of irritability, shakiness, weakness with blood glucose readings  in the 50-60's and felt better after treatment.  Recent laboratory results are reassuring and he does not give a history concerning for insulinoma (no life-threatening hypoglycemia, loss of consciousness, etc). Negative screen for adrenal insufficiency and secondary hypothyroidism.  He does have fasting hyperinsulinemia, which may be an early sign of pre-diabetes.  Repeat labs were reassuring. It is likely the high refined carbohydrates were stimulating a signficant insulin response, which in turn was driving his glucose lower. Glucose is more stable on Metformin, so will continue at low dose of 500 mg daily.  Gregg is comfortable with this.  This has been helpful I minimizing glucose excursions and increasing insulin sensitivity.  Currently on B12 supplement, but would plan to check vitamin b12 if he remains on this long term and goes of supplement.  As patient is doing well on current therapy, will refer back to PCP for ongoing management.      2.  Follow up- Prn. Patient has met criteria for clinic graduation with stable glucose control and will follow up with with PCP for diabetes management.  Alternatively, we would be happy to see him again, as needed, if questions or concerns arise, including rising hemoglobin A1c, worsening hypoglycemia, or need for adjustment.     32 minutes spent on the date of the encounter doing chart review, review of test results, review of continuous glucose sensor, interpretation of glucose data, patient visit and documentation, counseling/coordination of care, and discussion of follow up plan for worsening hyper and hypoglycemia.  The patient understood and is satisfied with today's visit.        Greta Cannon PA-C, MPAS   [facility]  Department of Medicine  Division of Endocrinology and Diabetes          Again, thank you for allowing me to participate in the care of your patient.      Sincerely,    Greta Cannon PA-C

## 2024-08-05 NOTE — PATIENT INSTRUCTIONS
It has been a pleasure serving you.  You should continue to regularly follow up with your primary care provider for hypoglycemia management.    Please schedule appointment with your primary care provider within the next 3 to 6 months.  We have refilled all of your medication for the next year.    Future changes and refills should come from your primary care provider.     The endocrine care team remains available to you for future consultation should you and your doctor have new questions or concerns.      Greta Solis

## 2024-08-05 NOTE — NURSING NOTE
Current patient location:  [place]    Is the patient currently in the state of [place]? YES    Visit mode:VIDEO    If the visit is dropped, the patient can be reconnected by: VIDEO VISIT: Text to cell phone:   Telephone Information:   Mobile [phone]       Will anyone else be joining the visit? Patient did not verbalize anyone else would be joining the visit.  (If patient encounters technical issues they should call [phone]    How would you like to obtain your AVS? MyChart    Are changes needed to the allergy or medication list? No  Mohsenne reported no changes to e-check in information for visit. VF did not review e-check in information again with Gregg due to this.       Are refills needed on medications prescribed by this physician? Unknown  (patient wanting to discuss medications) Gregg reported recurrent kidney infections and is wondering if medication needs to be switched.     Rooming Documentation:  Not applicable      Reason for visit: RECHECK    Sonali BURCH

## 2024-08-14 ENCOUNTER — OFFICE VISIT (OUTPATIENT)
Dept: DERMATOLOGY | Facility: CLINIC | Age: 29
End: 2024-08-14
Payer: COMMERCIAL

## 2024-08-14 DIAGNOSIS — L70.0 ACNE VULGARIS: Primary | ICD-10-CM

## 2024-08-14 DIAGNOSIS — B37.49 CANDIDIASIS OF GENITALIA: ICD-10-CM

## 2024-08-14 DIAGNOSIS — Z79.899 ON ISOTRETINOIN THERAPY: ICD-10-CM

## 2024-08-14 PROCEDURE — 99214 OFFICE O/P EST MOD 30 MIN: CPT | Performed by: PHYSICIAN ASSISTANT

## 2024-08-14 RX ORDER — FLUCONAZOLE 150 MG/1
TABLET ORAL
Qty: 2 TABLET | Refills: 0 | Status: SHIPPED | OUTPATIENT
Start: 2024-08-14 | End: 2024-09-16

## 2024-08-14 RX ORDER — ISOTRETINOIN 10 MG/1
10 CAPSULE ORAL DAILY
Qty: 30 CAPSULE | Refills: 0 | Status: SHIPPED | OUTPATIENT
Start: 2024-08-14 | End: 2024-09-16

## 2024-08-14 ASSESSMENT — PAIN SCALES - GENERAL: PAINLEVEL: NO PAIN (0)

## 2024-08-14 NOTE — LETTER
8/14/2024       RE: Gregg Maharaj  [address]  [address]  [place] [address]     Dear Colleague,    Thank you for referring your patient, Gregg Maharaj, to the [facility] [place] [facility]. Please see a copy of my visit note below.    [facility] Dermatology Note  Encounter Date: Aug 14, 2024  Office visit.    Dermatology Problem List:  1. Nonscarring alopecia consistent with Telogen effluvium   - Current tx: none   - Prior offered: Rogaine 5% foam (not covered by insurance), minoxidil 1.25 mg (low bp exacerbating POTS)   2. Folliculitis  - previous tx: ketoconazole 2% shampoo (burned scalp), tretinoin cream  3. Irritant hand dermatitis  - Current tx: tacrolimus ointment  - Prior tx: triamcinolone 0.1% ointment bid  4. Referred to genetics- family hx of EDS  5. Hyperhidrosis   - Current tx: oxybutynin 10 mg   6. Acne Vulgaris  - current tx: Isotretinoin 10 mg       Had a total hysterectomy and salpingectomy 7/26/23.  ____________________________________________     Assessment & Plan:      # Acne vulgaris face/ folliculitis, back, improving.   - We discussed at length about how isotretinoin is a know teratogen.  We discussed that there have been reports of depression with suicide in patients on isotretinoin and that there have been reports of an increased risk of IBD on isotretinoin although several meta analyses have contradicted this.  We discussed risk of increased liver markers and lipids. We discussed expectations of dry skin, lips and eyes. He will not share the medication or donate blood while on isotretinoin.      Ipledge #[id number]  Cumulative dose: 2,400 mg     # Candidiasis of Genitalia s/p 2 week of antibiotics for pyelonephritis  - single dose Fluconazole 150 mg and repeat in 72 hours if not resolved      Procedures Performed:   None      Follow-up: 1 month(s) in-person, or earlier for new or  changing lesions    Staff and Scribe:  Scribe Disclosure:   By signing my name below, I, Addie Palmer, attest that this documentation has been prepared under the direction and in the presence of Monserrat Celeste PA-C.  - Electronically Signed: Addie Palmer 08/14/24       Provider Disclosure:  I agree with above History, Review of Systems, Physical exam and Plan.  I have reviewed the content of the documentation and have edited it as needed. I have personally performed the services documented here and the documentation accurately represents those services and the decisions I have made.      Electronically signed by:  All risks, benefits and alternatives were discussed with patient.  Patient is in agreement and understands the assessment and plan.  All questions were answered.  Sun Screen Education was given.   Return to Clinic in 1 months or sooner as needed.   Monserrat Celeste PA-C      ____________________________________________    CC: Accutane (Accutane follow up- patient states that they discontinued accutane due to kidney infection and high lab levels )    HPI:  Mr. Gregg Maharaj is a(n) 29 year old male who presents today as a return patient for acne.     Patient is feeling well s/p recent kidney infection. He reports he typically gets yeast infections after taking antibiotics and feels he needs treatment. He wonders if an estrogen cream may be helpful for the labial area. He reports increased acne on his back which he notes has been worsening since discontinuing accutane.      Patient is otherwise feeling well, without additional skin concerns.    Labs Reviewed:  CMP, CK and Lipid panel 7/22/24    Physical exam:  Vitals: There were no vitals taken for this visit.  GEN: This is a well developed, well-nourished male in no acute distress, in a pleasant mood.    SKIN: Acne exam, which includes the face, neck, upper central chest, and upper central back was performed. Vulvar area examined today.  - whitish  discharge noted to inner labia minora  - few excoriated papules on back  - acne scarring noted throughout back and chest  - No other lesions of concern on areas examined.         Medications:  Current Outpatient Medications   Medication Sig Dispense Refill     ACCU-CHEK GUIDE test strip TEST ONCE DAILY OR WITH SYMPTOMS. Follow up visit needed. Call  to schedule. 100 strip 1     albuterol (PROAIR HFA/PROVENTIL HFA/VENTOLIN HFA) 108 (90 Base) MCG/ACT inhaler Inhale 2 puffs into the lungs as needed       azelastine (ASTELIN) 0.1 % nasal spray USE 1 TO 2 SPRAYS IN EACH NOSTRIL 1 TO 2 TIMES DAILY AS NEEDED       baclofen (LIORESAL) 10 MG tablet TAKE 1 TO 2 TABLETS BY MOUTH EVERY NIGHT       buPROPion (WELLBUTRIN XL) 150 MG 24 hr tablet Take 150 mg by mouth every morning (Patient not taking: Reported on 7/17/2024)       calcipotriene (DOVONOX) 0.005 % external solution Apply 10-15 drops to scalp at nighttime before bed (Patient not taking: Reported on 7/17/2024) 60 mL 11     cetirizine (ZYRTEC) 10 MG tablet Take 10 mg by mouth daily Pt takes at HS       clindamycin-benzoyl peroxide (BENZACLIN) 1-5 % external gel as needed (Patient not taking: Reported on 7/17/2024)       clotrimazole (LOTRIMIN) 1 % external cream as needed (Patient not taking: Reported on 7/17/2024)       CONCERTA 54 MG CR tablet        Continuous Blood Gluc Sensor (FREESTYLE JOSSY 2 SENSOR) MISC Change SENSOR EVERY 14 DAYS. Follow up visit needed. Call  to schedule. 2 each 3     cyanocobalamin (VITAMIN B-12) 500 MCG tablet Take 1 tablet (500 mcg) by mouth daily 90 tablet 1     diazepam (VALIUM) 5 MG tablet VAGINAL VALIUM SUPPOSITORY 5MG AT NIGHT AND PRIOR TO THERAPY AS NEEDED (Patient not taking: Reported on 7/17/2024) 40 tablet 3     diphenhydrAMINE (BENADRYL) 25 MG capsule Take 25 mg by mouth as needed (Patient not taking: Reported on 7/17/2024)       EPINEPHrine (ANY BX GENERIC EQUIV) 0.3 MG/0.3ML injection 2-pack Inject into  the lateral thigh as needed for life threatening allergic reactions.       estradiol cypionate (DEPO-ESTRADIOL) 5 MG/ML injection Inject into the muscle every 3 months (Patient not taking: Reported on 7/17/2024)       famotidine (PEPCID) 20 MG tablet Take 20 mg by mouth 2 times daily (Patient not taking: Reported on 7/17/2024)       fluticasone (FLONASE) 50 MCG/ACT nasal spray Spray 2 sprays into both nostrils 2 times daily 16 g 11     folic acid (FOLVITE) 1 MG tablet Take 1 tablet (1 mg) by mouth daily 90 tablet 1     gabapentin (NEURONTIN) 300 MG capsule Take 300-600 mg by mouth 3 times daily as needed 900 mg at night       HYDROcodone-acetaminophen (NORCO) 5-325 MG tablet Take 1 tablet by mouth every 6 hours as needed for severe pain       ibuprofen (ADVIL/MOTRIN) 200 MG capsule Take by mouth as needed       ISOtretinoin (ACCUTANE) 40 MG capsule Take 40 mg daily with food. Ipledge# [id number] 30 capsule 0     ketoconazole (NIZORAL) 2 % external shampoo APPLY TOPICALLY EVERY OTHER DAY TO WET SCALP AND BACK AND LET SIT FOR 3 TO5 MINUTES (Patient not taking: Reported on 7/17/2024) 120 mL 8     levETIRAcetam (KEPPRA) 500 MG tablet Take 500 mg by mouth 2 times daily Patient reports taking 750 mg       loratadine (CLARITIN) 10 MG tablet Take 10 mg by mouth every morning (Patient not taking: Reported on 7/17/2024)       metFORMIN (GLUCOPHAGE) 500 MG tablet Take 1 tablet (500 mg) by mouth daily (with breakfast) 90 tablet 3     Methylphenidate HCl (RITALIN PO) Take 36 mg by mouth 3 times daily (with meals) (Patient not taking: Reported on 7/17/2024)       methylphenidate HCl ER, OSM, (CONCERTA) 18 MG CR tablet Take 18 mg by mouth (Patient not taking: Reported on 7/17/2024)       minoxidil (LONITEN) 2.5 MG tablet Take 1/2 tablet (1.25 mg) in the morning once daily. (Patient not taking: Reported on 7/17/2024) 45 tablet 3     Minoxidil 5 % FOAM Apply thin layer twice daily to scalp (Patient not taking: Reported on 7/17/2024)  60 g 11     montelukast (SINGULAIR) 10 MG tablet Take 10 mg by mouth At Bedtime       ondansetron (ZOFRAN ODT) 4 MG ODT tab Take 1 tablet (4 mg) by mouth every 8 hours as needed for nausea (Patient not taking: Reported on 7/17/2024) 12 tablet 0     OTHER MEDICAL SUPPLIES        oxybutynin ER (DITROPAN XL) 10 MG 24 hr tablet Take 1 tablet (10 mg) by mouth daily 30 tablet 11     oxybutynin ER (DITROPAN XL) 5 MG 24 hr tablet Take 1 tablet (5 mg) by mouth daily (Patient not taking: Reported on 7/17/2024) 90 tablet 2     propranolol ER (INDERAL LA) 160 MG 24 hr capsule Take 160 mg by mouth At Bedtime       Specialty Vitamins Products (VITAMINS FOR HAIR) CAPS Take 1 tablet by mouth daily       SUMAtriptan (IMITREX) 50 MG tablet Take 50 mg by mouth at onset of headache (Patient not taking: Reported on 7/17/2024)       tacrolimus (PROTOPIC) 0.1 % external ointment Apply topically 2 times daily To rashes on the hands as needed. (Patient not taking: Reported on 7/17/2024) 60 g 3     testosterone (ANDROGEL 1.62 % PUMP) 20.25 MG/ACT gel Place 2 Pump onto the skin every morning       tretinoin (RETIN-A) 0.05 % external cream Apply topically At Bedtime To the back every other night (increase to nightly if tolerated). Apply a moisturizer on top. (Patient not taking: Reported on 7/17/2024) 45 g 11     ubrogepant (UBRELVY) 50 MG tablet Take 50 mg by mouth at onset of headache PRN       venlafaxine (EFFEXOR-ER) 150 MG TB24 24 hr tablet Take 300 mg by mouth every morning       vitamin D3 (CHOLECALCIFEROL) 10 MCG (400 UNIT) capsule Take 2 capsules (800 Units) by mouth daily (Patient not taking: Reported on 7/17/2024) 60 capsule 11     Current Facility-Administered Medications   Medication Dose Route Frequency Provider Last Rate Last Admin     ciprofloxacin (CIPRO) tablet 500 mg  500 mg Oral Once Arden Mercedes MD          Past Medical History:   Patient Active Problem List   Diagnosis     Acute UTI     VERNON positive     Bilateral leg  pain     Dysmenorrhea     Elevated CK     Mild intermittent asthma without complication     Narcolepsy and cataplexy     Panic     Urinary urgency     Constipation     Facial pain     Seasonal allergic rhinitis, unspecified trigger     Atopic dermatitis, unspecified type     Past Medical History:   Diagnosis Date     ADHD (attention deficit hyperactivity disorder)      VERNON positive      Anxiety      Asthma      Borderline personality disorder (H)      Concussion      Depression      Fibromyalgia      Gastroesophageal reflux disease with esophagitis      Gender dysphoria in adolescent and adult      Hypermobility of joint      Hypersomnia      Migraine      Mucous retention cyst of maxillary sinus      Obesity      PTSD (post-traumatic stress disorder)      Seasonal allergic rhinitis      Urinary frequency         CC No referring provider defined for this encounter. on close of this encounter.      Again, thank you for allowing me to participate in the care of your patient.      Sincerely,    Monserrat Celeste PA-C

## 2024-08-14 NOTE — PROGRESS NOTES
[facility] Dermatology Note  Encounter Date: Aug 14, 2024  Office visit.    Dermatology Problem List:  1. Nonscarring alopecia consistent with Telogen effluvium   - Current tx: none   - Prior offered: Rogaine 5% foam (not covered by insurance), minoxidil 1.25 mg (low bp exacerbating POTS)   2. Folliculitis  - previous tx: ketoconazole 2% shampoo (burned scalp), tretinoin cream  3. Irritant hand dermatitis  - Current tx: tacrolimus ointment  - Prior tx: triamcinolone 0.1% ointment bid  4. Referred to genetics- family hx of EDS  5. Hyperhidrosis   - Current tx: oxybutynin 10 mg   6. Acne Vulgaris  - current tx: Isotretinoin 10 mg       Had a total hysterectomy and salpingectomy 7/26/23.  ____________________________________________     Assessment & Plan:      # Acne vulgaris face/ folliculitis, back, improving.   - We discussed at length about how isotretinoin is a know teratogen.  We discussed that there have been reports of depression with suicide in patients on isotretinoin and that there have been reports of an increased risk of IBD on isotretinoin although several meta analyses have contradicted this.  We discussed risk of increased liver markers and lipids. We discussed expectations of dry skin, lips and eyes. He will not share the medication or donate blood while on isotretinoin.      Ipledge #[id number]  Cumulative dose: 2,400 mg     # Candidiasis of Genitalia s/p 2 week of antibiotics for pyelonephritis  - single dose Fluconazole 150 mg and repeat in 72 hours if not resolved      Procedures Performed:   None      Follow-up: 1 month(s) in-person, or earlier for new or changing lesions    Staff and Scribe:  Scribe Disclosure:   By signing my name below, I, Addie Palmer, attest that this documentation has been prepared under the direction and in the presence of Monserrat Celeste PA-C.  - Electronically Signed: Addie Palmer 08/14/24       Provider Disclosure:  I agree with above History,  Review of Systems, Physical exam and Plan.  I have reviewed the content of the documentation and have edited it as needed. I have personally performed the services documented here and the documentation accurately represents those services and the decisions I have made.      Electronically signed by:  All risks, benefits and alternatives were discussed with patient.  Patient is in agreement and understands the assessment and plan.  All questions were answered.  Sun Screen Education was given.   Return to Clinic in 1 months or sooner as needed.   Monserrat Celeste PA-C      ____________________________________________    CC: Accutane (Accutane follow up- patient states that they discontinued accutane due to kidney infection and high lab levels )    HPI:  Mr. Gregg Maharaj is a(n) 29 year old male who presents today as a return patient for acne.     Patient is feeling well s/p recent kidney infection. He reports he typically gets yeast infections after taking antibiotics and feels he needs treatment. He wonders if an estrogen cream may be helpful for the labial area. He reports increased acne on his back which he notes has been worsening since discontinuing accutane.      Patient is otherwise feeling well, without additional skin concerns.    Labs Reviewed:  CMP, CK and Lipid panel 7/22/24    Physical exam:  Vitals: There were no vitals taken for this visit.  GEN: This is a well developed, well-nourished male in no acute distress, in a pleasant mood.    SKIN: Acne exam, which includes the face, neck, upper central chest, and upper central back was performed. Vulvar area examined today.  - whitish discharge noted to inner labia minora  - few excoriated papules on back  - acne scarring noted throughout back and chest  - No other lesions of concern on areas examined.         Medications:  Current Outpatient Medications   Medication Sig Dispense Refill    ACCU-CHEK GUIDE test strip TEST ONCE DAILY OR WITH SYMPTOMS. Follow  up visit needed. Call  to schedule. 100 strip 1    albuterol (PROAIR HFA/PROVENTIL HFA/VENTOLIN HFA) 108 (90 Base) MCG/ACT inhaler Inhale 2 puffs into the lungs as needed      azelastine (ASTELIN) 0.1 % nasal spray USE 1 TO 2 SPRAYS IN EACH NOSTRIL 1 TO 2 TIMES DAILY AS NEEDED      baclofen (LIORESAL) 10 MG tablet TAKE 1 TO 2 TABLETS BY MOUTH EVERY NIGHT      buPROPion (WELLBUTRIN XL) 150 MG 24 hr tablet Take 150 mg by mouth every morning (Patient not taking: Reported on 7/17/2024)      calcipotriene (DOVONOX) 0.005 % external solution Apply 10-15 drops to scalp at nighttime before bed (Patient not taking: Reported on 7/17/2024) 60 mL 11    cetirizine (ZYRTEC) 10 MG tablet Take 10 mg by mouth daily Pt takes at HS      clindamycin-benzoyl peroxide (BENZACLIN) 1-5 % external gel as needed (Patient not taking: Reported on 7/17/2024)      clotrimazole (LOTRIMIN) 1 % external cream as needed (Patient not taking: Reported on 7/17/2024)      CONCERTA 54 MG CR tablet       Continuous Blood Gluc Sensor (FREESTYLE JOSSY 2 SENSOR) MISC Change SENSOR EVERY 14 DAYS. Follow up visit needed. Call  to schedule. 2 each 3    cyanocobalamin (VITAMIN B-12) 500 MCG tablet Take 1 tablet (500 mcg) by mouth daily 90 tablet 1    diazepam (VALIUM) 5 MG tablet VAGINAL VALIUM SUPPOSITORY 5MG AT NIGHT AND PRIOR TO THERAPY AS NEEDED (Patient not taking: Reported on 7/17/2024) 40 tablet 3    diphenhydrAMINE (BENADRYL) 25 MG capsule Take 25 mg by mouth as needed (Patient not taking: Reported on 7/17/2024)      EPINEPHrine (ANY BX GENERIC EQUIV) 0.3 MG/0.3ML injection 2-pack Inject into the lateral thigh as needed for life threatening allergic reactions.      estradiol cypionate (DEPO-ESTRADIOL) 5 MG/ML injection Inject into the muscle every 3 months (Patient not taking: Reported on 7/17/2024)      famotidine (PEPCID) 20 MG tablet Take 20 mg by mouth 2 times daily (Patient not taking: Reported on 7/17/2024)      fluticasone  (FLONASE) 50 MCG/ACT nasal spray Spray 2 sprays into both nostrils 2 times daily 16 g 11    folic acid (FOLVITE) 1 MG tablet Take 1 tablet (1 mg) by mouth daily 90 tablet 1    gabapentin (NEURONTIN) 300 MG capsule Take 300-600 mg by mouth 3 times daily as needed 900 mg at night      HYDROcodone-acetaminophen (NORCO) 5-325 MG tablet Take 1 tablet by mouth every 6 hours as needed for severe pain      ibuprofen (ADVIL/MOTRIN) 200 MG capsule Take by mouth as needed      ISOtretinoin (ACCUTANE) 40 MG capsule Take 40 mg daily with food. Ipledge# [id number] 30 capsule 0    ketoconazole (NIZORAL) 2 % external shampoo APPLY TOPICALLY EVERY OTHER DAY TO WET SCALP AND BACK AND LET SIT FOR 3 TO5 MINUTES (Patient not taking: Reported on 7/17/2024) 120 mL 8    levETIRAcetam (KEPPRA) 500 MG tablet Take 500 mg by mouth 2 times daily Patient reports taking 750 mg      loratadine (CLARITIN) 10 MG tablet Take 10 mg by mouth every morning (Patient not taking: Reported on 7/17/2024)      metFORMIN (GLUCOPHAGE) 500 MG tablet Take 1 tablet (500 mg) by mouth daily (with breakfast) 90 tablet 3    Methylphenidate HCl (RITALIN PO) Take 36 mg by mouth 3 times daily (with meals) (Patient not taking: Reported on 7/17/2024)      methylphenidate HCl ER, OSM, (CONCERTA) 18 MG CR tablet Take 18 mg by mouth (Patient not taking: Reported on 7/17/2024)      minoxidil (LONITEN) 2.5 MG tablet Take 1/2 tablet (1.25 mg) in the morning once daily. (Patient not taking: Reported on 7/17/2024) 45 tablet 3    Minoxidil 5 % FOAM Apply thin layer twice daily to scalp (Patient not taking: Reported on 7/17/2024) 60 g 11    montelukast (SINGULAIR) 10 MG tablet Take 10 mg by mouth At Bedtime      ondansetron (ZOFRAN ODT) 4 MG ODT tab Take 1 tablet (4 mg) by mouth every 8 hours as needed for nausea (Patient not taking: Reported on 7/17/2024) 12 tablet 0    OTHER MEDICAL SUPPLIES       oxybutynin ER (DITROPAN XL) 10 MG 24 hr tablet Take 1 tablet (10 mg) by mouth daily  30 tablet 11    oxybutynin ER (DITROPAN XL) 5 MG 24 hr tablet Take 1 tablet (5 mg) by mouth daily (Patient not taking: Reported on 7/17/2024) 90 tablet 2    propranolol ER (INDERAL LA) 160 MG 24 hr capsule Take 160 mg by mouth At Bedtime      Specialty Vitamins Products (VITAMINS FOR HAIR) CAPS Take 1 tablet by mouth daily      SUMAtriptan (IMITREX) 50 MG tablet Take 50 mg by mouth at onset of headache (Patient not taking: Reported on 7/17/2024)      tacrolimus (PROTOPIC) 0.1 % external ointment Apply topically 2 times daily To rashes on the hands as needed. (Patient not taking: Reported on 7/17/2024) 60 g 3    testosterone (ANDROGEL 1.62 % PUMP) 20.25 MG/ACT gel Place 2 Pump onto the skin every morning      tretinoin (RETIN-A) 0.05 % external cream Apply topically At Bedtime To the back every other night (increase to nightly if tolerated). Apply a moisturizer on top. (Patient not taking: Reported on 7/17/2024) 45 g 11    ubrogepant (UBRELVY) 50 MG tablet Take 50 mg by mouth at onset of headache PRN      venlafaxine (EFFEXOR-ER) 150 MG TB24 24 hr tablet Take 300 mg by mouth every morning      vitamin D3 (CHOLECALCIFEROL) 10 MCG (400 UNIT) capsule Take 2 capsules (800 Units) by mouth daily (Patient not taking: Reported on 7/17/2024) 60 capsule 11     Current Facility-Administered Medications   Medication Dose Route Frequency Provider Last Rate Last Admin    ciprofloxacin (CIPRO) tablet 500 mg  500 mg Oral Once Arden Mercedes MD          Past Medical History:   Patient Active Problem List   Diagnosis    Acute UTI    VERNON positive    Bilateral leg pain    Dysmenorrhea    Elevated CK    Mild intermittent asthma without complication    Narcolepsy and cataplexy    Panic    Urinary urgency    Constipation    Facial pain    Seasonal allergic rhinitis, unspecified trigger    Atopic dermatitis, unspecified type     Past Medical History:   Diagnosis Date    ADHD (attention deficit hyperactivity disorder)     VERNON positive      Anxiety     Asthma     Borderline personality disorder (H)     Concussion     Depression     Fibromyalgia     Gastroesophageal reflux disease with esophagitis     Gender dysphoria in adolescent and adult     Hypermobility of joint     Hypersomnia     Migraine     Mucous retention cyst of maxillary sinus     Obesity     PTSD (post-traumatic stress disorder)     Seasonal allergic rhinitis     Urinary frequency         CC No referring provider defined for this encounter. on close of this encounter.

## 2024-08-14 NOTE — PATIENT INSTRUCTIONS
Pediatric Dermatology  [place] [id number] [place] [id number]   [phone]   Isotretinoin/Accutane      What is Isotretinoin?     Isotretinoin, more commonly known by its former brand name of  Accutane , is an oral treatment for acne derived from Vitamin A. It is the most effective acne treatment available and often results in a long-term remission or  cure . It has been used since the 1980s in various formulations. It is used to treat moderate or severe acne, or acne that is causing scars.     How does isotretinoin work?  Decreases the size of oil glands and oil production   Prevents growth of acne-causing bacteria   Allows the skin cells to mature more normally   Reduces skin inflammation     How long do I need to take isotretinoin?     Patients typically take the medicine for 6-8 months until reaching a weight-based  goal dose . Some people may need to take isotretinoin longer depending on their response to treatment. Always take isotretinoin with food because it needs the help from dietary fat to be absorbed.     What is  iPledge ?     iPledge website: [employer] [place].PayUsLessRx.com     Babies born to mothers taking isotretinoin can have birth defects. The medicine is therefore regulated by a national program called  iPledge . There is no risk of birth defects in babies born to males taking isotretinoin. The birth defect risk for females lasts for one month after stopping the medication. There is no impact on fertility.     Patients are registered in iPledge under one of two categories:  1.  Patients who can get pregnant : Patients with functional female reproductive organs   2.  Patients who cannot get pregnant : Patients without functional female reproductive organs and patients with male reproductive organs    All patients:   To qualify for a prescription for isotretinoin we will need to enroll you in the iPledge program   You cannot share your medication   You cannot donate blood  while taking isotretinoin and for one month after        Patients who can get pregnant:   You need to use two forms or birth control or be abstinent from sexual activity   Women need to take two pregnancy tests at least 30 days apart prior to starting isotretinoin, and then a pregnancy test monthly   Each month you need to log in to the iPledge website to answer comprehension questions showing that you know to avoid pregnancy while taking isotretinoin.   After your clinic visit you will only have 7 days to  your prescription at the pharmacy. If you miss the pick-up window you will need to take another pregnancy test.     Do I need blood work?   Because isotretinoin can rarely cause changes in liver tests and lipid levels you will need initial lab monitoring for safety. In most cases, blood work is needed at baseline, and after dose increases.      *Patients of childbearing potential are required per the iPledge system, to complete a pregnancy test each month. Your prescribing provider will discuss more details about this with you.      Lab testing:   Labs should be collected at an [facility] location when possible. If you prefer to have them collected at a non-[place] facility, please ensure that the results are faxed to our office at [phone]. Be aware there may be a delay in receiving results from outside clinics.      What are the side effects?   Almost everyone will have dry skin and lips when taking isotretinoin. Patients who wear contacts may especially notice more eye dryness. All side effects will stop when the isotretinoin is stopped. It s important to tell your doctor about side effects so that we can help to treat or prevent them.     Common:     Dryness: skin, eyes, nose, lips   Slight elevation of blood lipids (triglycerides)   Sun sensitivity   Skin fragility    Less common:   Headaches- contact clinic if severe and persistent   Muscle aches   Nausea   Nose bleeds   Decreased night  vision    Very rare:  Changes in liver enzymes   Very high triglycerides        Troubleshooting tips for common side effects:    Dry lips: Apply Vaseline or Aquaphor throughout the day and at bedtime.   Nosebleeds: Apply a small amount of Vaseline or Aquaphor just inside each nostril nightly at bedtime.   Dry skin: Use a mild gentle soap for bathing and a moisturizer daily. Avoid exfoliating the skin. Avoid acne washes.   Dry eyes: Use lubricating eye drops throughout the day. Decrease contact lens use.     What about mood changes?     You may have read that isotretinoin has been linked with mood changes, depression, and suicidality. A recent large study reviewing data linking isotretinoin and depression found no association (improvements in depression were actually noted). As this question has not been definitively answered we will continue to ask about your mood each month. Please stop the medication and call our clinic if you are noticing symptoms of increased sadness/depression. Seek immediate medical attention if you have thoughts of suicide or self-harm.     Commonly asked Questions:    Should I continue my other acne treatments while I take isotretinoin?   Please stop all other acne treatments. This includes oral antibiotics, acne creams, and acne washes. These may be too harsh for use with isotretinoin, causing extra skin dryness.     Females should continue birth control pills while on isotretinoin unless instructed to stop them.     What if I have problems getting my medicine at the pharmacy?  If you are not able to obtain your medicine from the pharmacy, please contact our clinic as soon as possible.     What if I missed my appointment?  We cannot dispense an isotretinoin refill if you have not been seen. Please contact the nursing line to coordinate an appointment.     What should I do if I forgot to take my medication?  It is ok to take a double dose the following day. If you have missed several days of  medicine, notify your provider at your next appointment to make a plan.    What if I m going to run out of medicine before my next appointment?  Going without the medicine for several days is not a concern. The medicine works in the long-term so this will not be a setback.     Contact info:  If you have any questions or concerns about your isotretinoin, please call the Division of Pediatric Dermatology at [facility] at *[phone]* during clinic hours. If you have questions or concerns over the weekend, a holiday or after clinic hours please call *[phone]* and ask for the Dermatology Resident on-call to be paged.

## 2024-08-15 ENCOUNTER — MYC REFILL (OUTPATIENT)
Dept: DERMATOLOGY | Facility: CLINIC | Age: 29
End: 2024-08-15
Payer: COMMERCIAL

## 2024-08-15 ENCOUNTER — MYC MEDICAL ADVICE (OUTPATIENT)
Dept: DERMATOLOGY | Facility: CLINIC | Age: 29
End: 2024-08-15
Payer: COMMERCIAL

## 2024-08-15 DIAGNOSIS — L74.519 FOCAL HYPERHIDROSIS: ICD-10-CM

## 2024-08-17 NOTE — TELEPHONE ENCOUNTER
oxybutynin ER (DITROPAN XL) 5 MG 24 hr tablet   Disp-30 tablet, R-11,   Start: 06/22/2023 8/14/2024  [facility], Monserrat Hudson PA-C  Dermatology      Current tx: oxybutynin 10 mg     Routed because: my chart request.not on protocol

## 2024-08-18 RX ORDER — OXYBUTYNIN CHLORIDE 10 MG/1
10 TABLET, EXTENDED RELEASE ORAL DAILY
Qty: 30 TABLET | Refills: 11 | Status: SHIPPED | OUTPATIENT
Start: 2024-08-18

## 2024-08-19 ENCOUNTER — PRE VISIT (OUTPATIENT)
Dept: UROLOGY | Facility: CLINIC | Age: 29
End: 2024-08-19
Payer: COMMERCIAL

## 2024-08-19 NOTE — ANESTHESIA PROCEDURE NOTES
Airway       Patient location during procedure: OR       Procedure Start/Stop Times: 8/18/2023 1:14 PM  Staff -        Anesthesiologist:  Naomi Feliz MD       Resident/Fellow: Shu Tierney MD       Performed By: resident  Consent for Airway        Urgency: elective  Indications and Patient Condition       Indications for airway management: kp-procedural       Induction type:intravenous       Mask difficulty assessment: 1 - vent by mask    Final Airway Details       Final airway type: endotracheal airway       Successful airway: ETT - single  Endotracheal Airway Details        ETT size (mm): 7.0       Cuffed: yes       Successful intubation technique: video laryngoscopy       VL Blade Size: MAC 3       Grade View of Cords: 1       Adjucts: stylet       Position: Right       Measured from: lips       Secured at (cm): 21       Bite block used: Soft    Post intubation assessment        Placement verified by: capnometry, equal breath sounds and chest rise        Number of attempts at approach: 1       Number of other approaches attempted: 0       Secured with: pink tape       Ease of procedure: easy       Dentition: Intact and Unchanged    Medication(s) Administered   Medication Administration Time: 8/18/2023 1:14 PM        
Yes - the patient is able to be screened

## 2024-08-19 NOTE — TELEPHONE ENCOUNTER
Reason for visit: Kidney infection     Relevant information: Somatic dysfunction of pelvis region / Urgency-frequency syndrome / Pelvic pain     Records/imaging/labs/orders: Located in EPIC    Pt called: No need for a call    At Rooming: Standard rooming    Mildred Olson  8/19/2024  8:17 AM

## 2024-08-21 DIAGNOSIS — L70.0 ACNE VULGARIS: Primary | ICD-10-CM

## 2024-08-21 DIAGNOSIS — Z79.899 ON ISOTRETINOIN THERAPY: ICD-10-CM

## 2024-09-16 ENCOUNTER — LAB (OUTPATIENT)
Dept: LAB | Facility: CLINIC | Age: 29
End: 2024-09-16
Payer: COMMERCIAL

## 2024-09-16 ENCOUNTER — OFFICE VISIT (OUTPATIENT)
Dept: DERMATOLOGY | Facility: CLINIC | Age: 29
End: 2024-09-16
Payer: COMMERCIAL

## 2024-09-16 DIAGNOSIS — L70.0 ACNE VULGARIS: Primary | ICD-10-CM

## 2024-09-16 DIAGNOSIS — Z79.899 ON ISOTRETINOIN THERAPY: ICD-10-CM

## 2024-09-16 DIAGNOSIS — E16.2 HYPOGLYCEMIA: ICD-10-CM

## 2024-09-16 DIAGNOSIS — L70.0 ACNE VULGARIS: ICD-10-CM

## 2024-09-16 LAB
ALBUMIN SERPL BCG-MCNC: 4.4 G/DL (ref 3.5–5.2)
ALP SERPL-CCNC: 106 U/L (ref 40–150)
ALT SERPL W P-5'-P-CCNC: 27 U/L (ref 0–70)
ANION GAP SERPL CALCULATED.3IONS-SCNC: 13 MMOL/L (ref 7–15)
AST SERPL W P-5'-P-CCNC: 22 U/L (ref 0–45)
BILIRUB SERPL-MCNC: 0.4 MG/DL
BUN SERPL-MCNC: 8.6 MG/DL (ref 6–20)
CALCIUM SERPL-MCNC: 9.7 MG/DL (ref 8.8–10.4)
CHLORIDE SERPL-SCNC: 101 MMOL/L (ref 98–107)
CHOLEST SERPL-MCNC: 153 MG/DL
CK SERPL-CCNC: 67 U/L (ref 26–308)
CREAT SERPL-MCNC: 0.9 MG/DL (ref 0.51–1.17)
EGFRCR SERPLBLD CKD-EPI 2021: >90 ML/MIN/1.73M2
FASTING STATUS PATIENT QL REPORTED: YES
FASTING STATUS PATIENT QL REPORTED: YES
GLUCOSE SERPL-MCNC: 117 MG/DL (ref 70–99)
HCO3 SERPL-SCNC: 26 MMOL/L (ref 22–29)
HDLC SERPL-MCNC: 34 MG/DL
LDLC SERPL CALC-MCNC: 95 MG/DL
NONHDLC SERPL-MCNC: 119 MG/DL
POTASSIUM SERPL-SCNC: 4.3 MMOL/L (ref 3.4–5.3)
PROT SERPL-MCNC: 7 G/DL (ref 6.4–8.3)
SODIUM SERPL-SCNC: 140 MMOL/L (ref 135–145)
TRIGL SERPL-MCNC: 119 MG/DL
VIT B12 SERPL-MCNC: 1072 PG/ML (ref 232–1245)

## 2024-09-16 PROCEDURE — 80061 LIPID PANEL: CPT | Performed by: PATHOLOGY

## 2024-09-16 PROCEDURE — 99000 SPECIMEN HANDLING OFFICE-LAB: CPT | Performed by: PATHOLOGY

## 2024-09-16 PROCEDURE — 80053 COMPREHEN METABOLIC PANEL: CPT | Performed by: PATHOLOGY

## 2024-09-16 PROCEDURE — 99214 OFFICE O/P EST MOD 30 MIN: CPT | Performed by: PHYSICIAN ASSISTANT

## 2024-09-16 PROCEDURE — 82607 VITAMIN B-12: CPT | Performed by: PHYSICIAN ASSISTANT

## 2024-09-16 PROCEDURE — 36415 COLL VENOUS BLD VENIPUNCTURE: CPT | Performed by: PATHOLOGY

## 2024-09-16 PROCEDURE — 82550 ASSAY OF CK (CPK): CPT | Performed by: PATHOLOGY

## 2024-09-16 RX ORDER — LORAZEPAM 0.5 MG/1
0.5 TABLET ORAL DAILY
COMMUNITY
Start: 2024-09-06

## 2024-09-16 RX ORDER — LAMOTRIGINE 25 MG/1
25 TABLET ORAL DAILY
COMMUNITY
Start: 2024-09-06

## 2024-09-16 RX ORDER — ISOTRETINOIN 10 MG/1
10 CAPSULE ORAL DAILY
Qty: 30 CAPSULE | Refills: 0 | Status: SHIPPED | OUTPATIENT
Start: 2024-09-16

## 2024-09-16 NOTE — PROGRESS NOTES
[facility] Dermatology Note  Encounter Date: Sep 16, 2024  Office visit.    Dermatology Problem List:  1. Nonscarring alopecia consistent with Telogen effluvium   - Current tx: none   - Prior offered: Rogaine 5% foam (not covered by insurance), minoxidil 1.25 mg (low bp exacerbating POTS)   2. Folliculitis  - previous tx: ketoconazole 2% shampoo (burned scalp), tretinoin cream  3. Irritant hand dermatitis  - Current tx: tacrolimus ointment  - Prior tx: triamcinolone 0.1% ointment bid  4. Referred to genetics- family hx of EDS  5. Hyperhidrosis   - Current tx: oxybutynin 10 mg   6. Acne Vulgaris  - current tx: Isotretinoin 10 mg       Had a total hysterectomy and salpingectomy 7/26/23.  ____________________________________________     Assessment & Plan:      # Acne vulgaris face/ folliculitis, back, improving but complicated by myalgias at 40 mg. Will continue low dose isotretinoin.   - We discussed at length about how isotretinoin is a know teratogen.  We discussed that there have been reports of depression with suicide in patients on isotretinoin and that there have been reports of an increased risk of IBD on isotretinoin although several meta analyses have contradicted this.  We discussed risk of increased liver markers and lipids. We discussed expectations of dry skin, lips and eyes. He will not share the medication or donate blood while on isotretinoin.      Ipledge #[id number]  Cumulative dose: 2,700 mg     # Hx Candidiasis of Genitalia   - Resolved s/p course of fluconazole.     # Arthralgias, secondary to isotretinoin  Continue B12 500 mcg daily  Cotninue 1 mg folic acid daily  Continue Omega 3 fish oil daily with isotretinoin.   -Recheck CK today.     Procedures Performed:   None      Follow-up: 1 month(s) in-person, or earlier for new or changing lesions    Staff and Scribe:  Scribe Disclosure:   By signing my name below, I, Addie Palmer, attest that this documentation has been prepared  under the direction and in the presence of Monserrat Celeste PA-C.  - Electronically Signed: Addieyazan Palmer 09/16/24       Provider Disclosure:  I agree with above History, Review of Systems, Physical exam and Plan.  I have reviewed the content of the documentation and have edited it as needed. I have personally performed the services documented here and the documentation accurately represents those services and the decisions I have made.      Electronically signed by:  All risks, benefits and alternatives were discussed with patient.  Patient is in agreement and understands the assessment and plan.  All questions were answered.  Sun Screen Education was given.   Return to Clinic in 1 month or sooner as needed.   Monserrat Celeste PA-C        ____________________________________________    CC: RECHECK (Accutane follow up, had to go to the ER a few times due to seizures. Was having breakthrough events, thinking that maybe needs more time to adjust )    HPI:  Mr. Gregg Maharaj is a(n) 29 year old male who presents today as a return patient for acne.     Patient has been having experiences with seizures and adjustment of medications. He reports his acne and skin has been healing well.     Patient is otherwise feeling well, without additional skin concerns.    Labs Reviewed:  Lipid Reflex, CMP, CK from 09/16/2024    Recent Labs   Lab Test 09/16/24  0952 07/22/24  1135   CHOL 153 179   HDL 34* 35*   LDL 95 104*   TRIG 119 199*      CK 67  Last Comprehensive Metabolic Panel:  Sodium   Date Value Ref Range Status   09/16/2024 140 135 - 145 mmol/L Final     Potassium   Date Value Ref Range Status   09/16/2024 4.3 3.4 - 5.3 mmol/L Final     Chloride   Date Value Ref Range Status   09/16/2024 101 98 - 107 mmol/L Final     Carbon Dioxide (CO2)   Date Value Ref Range Status   09/16/2024 26 22 - 29 mmol/L Final     Anion Gap   Date Value Ref Range Status   09/16/2024 13 7 - 15 mmol/L Final     Glucose   Date Value Ref Range  "Status   09/16/2024 117 (H) 70 - 99 mg/dL Final     GLUCOSE BY METER POCT   Date Value Ref Range Status   08/18/2023 113 (H) 70 - 99 mg/dL Final     Urea Nitrogen   Date Value Ref Range Status   09/16/2024 8.6 6.0 - 20.0 mg/dL Final     Creatinine   Date Value Ref Range Status   09/16/2024 0.90 0.51 - 1.17 mg/dL Final     Comment:     Male and Female  0-2 Months    0.31-0.88 mg/dL  2-12 Months   0.16-0.39 mg/dL  1-2 Years     0.18-0.35 mg/dL  3-4 Years     0.26-0.42 mg/dL  5-6 Years     0.29-0.47 mg/dL  7-8 Years     0.34-0.53 mg/dL  9-10 Years    0.33-0.64 mg/dL  11-12 Years   0.44-0.68 mg/dL  13-14 Years   0.46-0.77 mg/dL    Female  15 Years and older  0.51-0.95 mg/dL    Male  15 Years and older  0.67-1.17 mg/dL         GFR Estimate   Date Value Ref Range Status   09/16/2024 >90 >60 mL/min/1.73m2 Final     Comment:     The generation of the estimated GFR is currently based on binary male or female sex. If the electronic health record information indicates another gender identity or if Legal Sex is recorded as \"Unknown\", GFR estimates are not automatically calculated, and application of GFR equations or a direct GFR measurement should be considered according to the individual's appropriate clinical context.  eGFR calculated using 2021 CKD-EPI equation.     GFR, ESTIMATED POCT   Date Value Ref Range Status   02/14/2022 >60 >60 mL/min/1.73m2 Final     Comment:     GFR not calculated when sex unspecified or nonbinary.     Calcium   Date Value Ref Range Status   09/16/2024 9.7 8.8 - 10.4 mg/dL Final     Comment:     Reference intervals for this test were updated on 7/16/2024 to reflect our healthy population more accurately. There may be differences in the flagging of prior results with similar values performed with this method. Those prior results can be interpreted in the context of the updated reference intervals.     Bilirubin Total   Date Value Ref Range Status   09/16/2024 0.4 <=1.2 mg/dL Final     Alkaline " Phosphatase   Date Value Ref Range Status   09/16/2024 106 40 - 150 U/L Final     Comment:     Female:   0-15 days     U/L  15d-1 year   122-469 U/L  1-10 years   142-335 U/L  10-13 years  129-417 U/L  13-15 years   U/L  15-17 years   U/L  17-19 years  45-87 U/L  19 years and older   U/L      Male:  0-15 days     U/L  15d-1 year   122-469 U/L  1-10 years   142-335 U/L  10-13 years  129-417 U/L  13-15 years  116-468 U/L  15-17 years   U/L  17-19 years   U/L  19 years and older   U/L       ALT   Date Value Ref Range Status   09/16/2024 27 0 - 70 U/L Final     Comment:     Female   All ages       0-50 U/L     Male   0-20 Years     0-50 U/L  20-Unsp. Years 0-70 U/L         AST   Date Value Ref Range Status   09/16/2024 22 0 - 45 U/L Final                  Physical exam:  Vitals: There were no vitals taken for this visit.  GEN: This is a well developed, well-nourished male in no acute distress, in a pleasant mood.    SKIN: Focused examination of the back was performed.  - scarring noted on most of back with few excoriated pink papules on posterior shoulders  - No other lesions of concern on areas examined.         Medications:  Current Outpatient Medications   Medication Sig Dispense Refill    ACCU-CHEK GUIDE test strip TEST ONCE DAILY OR WITH SYMPTOMS. Follow up visit needed. Call  to schedule. 100 strip 1    albuterol (PROAIR HFA/PROVENTIL HFA/VENTOLIN HFA) 108 (90 Base) MCG/ACT inhaler Inhale 2 puffs into the lungs as needed      azelastine (ASTELIN) 0.1 % nasal spray USE 1 TO 2 SPRAYS IN EACH NOSTRIL 1 TO 2 TIMES DAILY AS NEEDED      baclofen (LIORESAL) 10 MG tablet TAKE 1 TO 2 TABLETS BY MOUTH EVERY NIGHT      cetirizine (ZYRTEC) 10 MG tablet Take 10 mg by mouth daily Pt takes at HS      CONCERTA 54 MG CR tablet       Continuous Blood Gluc Sensor (FREESTYLE JOSSY 2 SENSOR) MISC Change SENSOR EVERY 14 DAYS. Follow up visit needed. Call  to  schedule. 2 each 3    cyanocobalamin (VITAMIN B-12) 500 MCG tablet Take 1 tablet (500 mcg) by mouth daily 90 tablet 1    EPINEPHrine (ANY BX GENERIC EQUIV) 0.3 MG/0.3ML injection 2-pack Inject into the lateral thigh as needed for life threatening allergic reactions.      fluticasone (FLONASE) 50 MCG/ACT nasal spray Spray 2 sprays into both nostrils 2 times daily 16 g 11    folic acid (FOLVITE) 1 MG tablet Take 1 tablet (1 mg) by mouth daily 90 tablet 1    gabapentin (NEURONTIN) 300 MG capsule Take 300-600 mg by mouth 3 times daily as needed 900 mg at night      ibuprofen (ADVIL/MOTRIN) 200 MG capsule Take by mouth as needed      ISOtretinoin (ACCUTANE) 10 MG capsule Take 1 capsule (10 mg) by mouth daily 30 capsule 0    lamoTRIgine (LAMICTAL) 25 MG tablet Take 25 mg by mouth daily. Take 1 tablet daily for 14 days, then increase to 2 tablets daily      levETIRAcetam (KEPPRA) 500 MG tablet Take 1,000 mg by mouth 2 times daily.      LORazepam (ATIVAN) 0.5 MG tablet Take 0.5 mg by mouth daily. Take 1-2 up to 2 times daily for seizure      metFORMIN (GLUCOPHAGE) 500 MG tablet Take 1 tablet (500 mg) by mouth daily (with breakfast) 90 tablet 3    montelukast (SINGULAIR) 10 MG tablet Take 10 mg by mouth At Bedtime      oxyBUTYnin ER (DITROPAN XL) 10 MG 24 hr tablet Take 1 tablet (10 mg) by mouth daily 30 tablet 11    propranolol ER (INDERAL LA) 160 MG 24 hr capsule Take 160 mg by mouth At Bedtime      testosterone (ANDROGEL 1.62 % PUMP) 20.25 MG/ACT gel Place 2 Pump onto the skin every morning      ubrogepant (UBRELVY) 50 MG tablet Take 50 mg by mouth at onset of headache PRN      venlafaxine (EFFEXOR-ER) 150 MG TB24 24 hr tablet Take 300 mg by mouth every morning      diphenhydrAMINE (BENADRYL) 25 MG capsule Take 25 mg by mouth as needed (Patient not taking: Reported on 7/17/2024)      fluconazole (DIFLUCAN) 150 MG tablet Take once orally and Repeat in 72 hours if symptoms are still present. (Patient not taking: Reported on  9/16/2024) 2 tablet 0    ketoconazole (NIZORAL) 2 % external shampoo APPLY TOPICALLY EVERY OTHER DAY TO WET SCALP AND BACK AND LET SIT FOR 3 TO5 MINUTES (Patient not taking: Reported on 7/17/2024) 120 mL 8    loratadine (CLARITIN) 10 MG tablet Take 10 mg by mouth every morning (Patient not taking: Reported on 7/17/2024)      methylphenidate HCl ER, OSM, (CONCERTA) 18 MG CR tablet Take 18 mg by mouth (Patient not taking: Reported on 7/17/2024)      OTHER MEDICAL SUPPLIES  (Patient not taking: Reported on 9/16/2024)      oxybutynin ER (DITROPAN XL) 5 MG 24 hr tablet Take 1 tablet (5 mg) by mouth daily (Patient not taking: Reported on 7/17/2024) 90 tablet 2    tacrolimus (PROTOPIC) 0.1 % external ointment Apply topically 2 times daily To rashes on the hands as needed. (Patient not taking: Reported on 7/17/2024) 60 g 3     Current Facility-Administered Medications   Medication Dose Route Frequency Provider Last Rate Last Admin    ciprofloxacin (CIPRO) tablet 500 mg  500 mg Oral Once Arden Mercedes MD          Past Medical History:   Patient Active Problem List   Diagnosis    Acute UTI    VERNON positive    Bilateral leg pain    Dysmenorrhea    Elevated CK    Mild intermittent asthma without complication    Narcolepsy and cataplexy    Panic    Urinary urgency    Constipation    Facial pain    Seasonal allergic rhinitis, unspecified trigger    Atopic dermatitis, unspecified type     Past Medical History:   Diagnosis Date    ADHD (attention deficit hyperactivity disorder)     VERNON positive     Anxiety     Asthma     Borderline personality disorder (H)     Concussion     Depression     Fibromyalgia     Gastroesophageal reflux disease with esophagitis     Gender dysphoria in adolescent and adult     Hypermobility of joint     Hypersomnia     Migraine     Mucous retention cyst of maxillary sinus     Obesity     PTSD (post-traumatic stress disorder)     Seasonal allergic rhinitis     Urinary frequency         CC No referring  provider defined for this encounter. on close of this encounter.

## 2024-09-16 NOTE — NURSING NOTE
Chief Complaint   Patient presents with    RECHECK     Accutane follow up, had to go to the ER a few times due to seizures. Was having breakthrough events, thinking that maybe needs more time to adjust      Alexa CHAPARRO, RN-BSN  Dermatology Surgery  [phone]

## 2024-09-16 NOTE — LETTER
9/16/2024       RE: Gregg Maharaj  [facility]  [address]  [place] [address]     Dear Colleague,    Thank you for referring your patient, Gregg Maharaj, to the [facility]MATOLOG[facility] [place] [facility]. Please see a copy of my visit note below.    [facility] Dermatology Note  Encounter Date: Sep 16, 2024  Office visit.    Dermatology Problem List:  1. Nonscarring alopecia consistent with Telogen effluvium   - Current tx: none   - Prior offered: Rogaine 5% foam (not covered by insurance), minoxidil 1.25 mg (low bp exacerbating POTS)   2. Folliculitis  - previous tx: ketoconazole 2% shampoo (burned scalp), tretinoin cream  3. Irritant hand dermatitis  - Current tx: tacrolimus ointment  - Prior tx: triamcinolone 0.1% ointment bid  4. Referred to genetics- family hx of EDS  5. Hyperhidrosis   - Current tx: oxybutynin 10 mg   6. Acne Vulgaris  - current tx: Isotretinoin 10 mg       Had a total hysterectomy and salpingectomy 7/26/23.  ____________________________________________     Assessment & Plan:      # Acne vulgaris face/ folliculitis, back, improving but complicated by myalgias at 40 mg. Will continue low dose isotretinoin.   - We discussed at length about how isotretinoin is a know teratogen.  We discussed that there have been reports of depression with suicide in patients on isotretinoin and that there have been reports of an increased risk of IBD on isotretinoin although several meta analyses have contradicted this.  We discussed risk of increased liver markers and lipids. We discussed expectations of dry skin, lips and eyes. He will not share the medication or donate blood while on isotretinoin.      Ipledge #[id number]  Cumulative dose: 2,700 mg     # Hx Candidiasis of Genitalia   - Resolved s/p course of fluconazole.     # Arthralgias, secondary to isotretinoin  Continue B12 500 mcg daily  Cotninue 1 mg folic acid  daily  Continue Omega 3 fish oil daily with isotretinoin.   -Recheck CK today.     Procedures Performed:   None      Follow-up: 1 month(s) in-person, or earlier for new or changing lesions    Staff and Scribe:  Scribe Disclosure:   By signing my name below, I, Addie Palmer, attest that this documentation has been prepared under the direction and in the presence of Monserrat Celeste PA-C.  - Electronically Signed: Addie Palmer 09/16/24       Provider Disclosure:  I agree with above History, Review of Systems, Physical exam and Plan.  I have reviewed the content of the documentation and have edited it as needed. I have personally performed the services documented here and the documentation accurately represents those services and the decisions I have made.      Electronically signed by:  All risks, benefits and alternatives were discussed with patient.  Patient is in agreement and understands the assessment and plan.  All questions were answered.  Sun Screen Education was given.   Return to Clinic in 1 month or sooner as needed.   Monserrat Celeste PA-C        ____________________________________________    CC: RECHECK (Accutane follow up, had to go to the ER a few times due to seizures. Was having breakthrough events, thinking that maybe needs more time to adjust )    HPI:  Mr. Gregg Maharaj is a(n) 29 year old male who presents today as a return patient for acne.     Patient has been having experiences with seizures and adjustment of medications. He reports his acne and skin has been healing well.     Patient is otherwise feeling well, without additional skin concerns.    Labs Reviewed:  Lipid Reflex, CMP, CK from 09/16/2024    Recent Labs   Lab Test 09/16/24  0952 07/22/24  1135   CHOL 153 179   HDL 34* 35*   LDL 95 104*   TRIG 119 199*      CK 67  Last Comprehensive Metabolic Panel:  Sodium   Date Value Ref Range Status   09/16/2024 140 135 - 145 mmol/L Final     Potassium   Date Value Ref Range Status  "  09/16/2024 4.3 3.4 - 5.3 mmol/L Final     Chloride   Date Value Ref Range Status   09/16/2024 101 98 - 107 mmol/L Final     Carbon Dioxide (CO2)   Date Value Ref Range Status   09/16/2024 26 22 - 29 mmol/L Final     Anion Gap   Date Value Ref Range Status   09/16/2024 13 7 - 15 mmol/L Final     Glucose   Date Value Ref Range Status   09/16/2024 117 (H) 70 - 99 mg/dL Final     GLUCOSE BY METER POCT   Date Value Ref Range Status   08/18/2023 113 (H) 70 - 99 mg/dL Final     Urea Nitrogen   Date Value Ref Range Status   09/16/2024 8.6 6.0 - 20.0 mg/dL Final     Creatinine   Date Value Ref Range Status   09/16/2024 0.90 0.51 - 1.17 mg/dL Final     Comment:     Male and Female  0-2 Months    0.31-0.88 mg/dL  2-12 Months   0.16-0.39 mg/dL  1-2 Years     0.18-0.35 mg/dL  3-4 Years     0.26-0.42 mg/dL  5-6 Years     0.29-0.47 mg/dL  7-8 Years     0.34-0.53 mg/dL  9-10 Years    0.33-0.64 mg/dL  11-12 Years   0.44-0.68 mg/dL  13-14 Years   0.46-0.77 mg/dL    Female  15 Years and older  0.51-0.95 mg/dL    Male  15 Years and older  0.67-1.17 mg/dL         GFR Estimate   Date Value Ref Range Status   09/16/2024 >90 >60 mL/min/1.73m2 Final     Comment:     The generation of the estimated GFR is currently based on binary male or female sex. If the electronic health record information indicates another gender identity or if Legal Sex is recorded as \"Unknown\", GFR estimates are not automatically calculated, and application of GFR equations or a direct GFR measurement should be considered according to the individual's appropriate clinical context.  eGFR calculated using 2021 CKD-EPI equation.     GFR, ESTIMATED POCT   Date Value Ref Range Status   02/14/2022 >60 >60 mL/min/1.73m2 Final     Comment:     GFR not calculated when sex unspecified or nonbinary.     Calcium   Date Value Ref Range Status   09/16/2024 9.7 8.8 - 10.4 mg/dL Final     Comment:     Reference intervals for this test were updated on 7/16/2024 to reflect our " healthy population more accurately. There may be differences in the flagging of prior results with similar values performed with this method. Those prior results can be interpreted in the context of the updated reference intervals.     Bilirubin Total   Date Value Ref Range Status   09/16/2024 0.4 <=1.2 mg/dL Final     Alkaline Phosphatase   Date Value Ref Range Status   09/16/2024 106 40 - 150 U/L Final     Comment:     Female:   0-15 days     U/L  15d-1 year   122-469 U/L  1-10 years   142-335 U/L  10-13 years  129-417 U/L  13-15 years   U/L  15-17 years   U/L  17-19 years  45-87 U/L  19 years and older   U/L      Male:  0-15 days     U/L  15d-1 year   122-469 U/L  1-10 years   142-335 U/L  10-13 years  129-417 U/L  13-15 years  116-468 U/L  15-17 years   U/L  17-19 years   U/L  19 years and older   U/L       ALT   Date Value Ref Range Status   09/16/2024 27 0 - 70 U/L Final     Comment:     Female   All ages       0-50 U/L     Male   0-20 Years     0-50 U/L  20-Unsp. Years 0-70 U/L         AST   Date Value Ref Range Status   09/16/2024 22 0 - 45 U/L Final                  Physical exam:  Vitals: There were no vitals taken for this visit.  GEN: This is a well developed, well-nourished male in no acute distress, in a pleasant mood.    SKIN: Focused examination of the back was performed.  - scarring noted on most of back with few excoriated pink papules on posterior shoulders  - No other lesions of concern on areas examined.         Medications:  Current Outpatient Medications   Medication Sig Dispense Refill     ACCU-CHEK GUIDE test strip TEST ONCE DAILY OR WITH SYMPTOMS. Follow up visit needed. Call  to schedule. 100 strip 1     albuterol (PROAIR HFA/PROVENTIL HFA/VENTOLIN HFA) 108 (90 Base) MCG/ACT inhaler Inhale 2 puffs into the lungs as needed       azelastine (ASTELIN) 0.1 % nasal spray USE 1 TO 2 SPRAYS IN EACH NOSTRIL 1 TO 2 TIMES DAILY AS NEEDED        baclofen (LIORESAL) 10 MG tablet TAKE 1 TO 2 TABLETS BY MOUTH EVERY NIGHT       cetirizine (ZYRTEC) 10 MG tablet Take 10 mg by mouth daily Pt takes at HS       CONCERTA 54 MG CR tablet        Continuous Blood Gluc Sensor (FREESTYLE JOSSY 2 SENSOR) MISC Change SENSOR EVERY 14 DAYS. Follow up visit needed. Call  to schedule. 2 each 3     cyanocobalamin (VITAMIN B-12) 500 MCG tablet Take 1 tablet (500 mcg) by mouth daily 90 tablet 1     EPINEPHrine (ANY BX GENERIC EQUIV) 0.3 MG/0.3ML injection 2-pack Inject into the lateral thigh as needed for life threatening allergic reactions.       fluticasone (FLONASE) 50 MCG/ACT nasal spray Spray 2 sprays into both nostrils 2 times daily 16 g 11     folic acid (FOLVITE) 1 MG tablet Take 1 tablet (1 mg) by mouth daily 90 tablet 1     gabapentin (NEURONTIN) 300 MG capsule Take 300-600 mg by mouth 3 times daily as needed 900 mg at night       ibuprofen (ADVIL/MOTRIN) 200 MG capsule Take by mouth as needed       ISOtretinoin (ACCUTANE) 10 MG capsule Take 1 capsule (10 mg) by mouth daily 30 capsule 0     lamoTRIgine (LAMICTAL) 25 MG tablet Take 25 mg by mouth daily. Take 1 tablet daily for 14 days, then increase to 2 tablets daily       levETIRAcetam (KEPPRA) 500 MG tablet Take 1,000 mg by mouth 2 times daily.       LORazepam (ATIVAN) 0.5 MG tablet Take 0.5 mg by mouth daily. Take 1-2 up to 2 times daily for seizure       metFORMIN (GLUCOPHAGE) 500 MG tablet Take 1 tablet (500 mg) by mouth daily (with breakfast) 90 tablet 3     montelukast (SINGULAIR) 10 MG tablet Take 10 mg by mouth At Bedtime       oxyBUTYnin ER (DITROPAN XL) 10 MG 24 hr tablet Take 1 tablet (10 mg) by mouth daily 30 tablet 11     propranolol ER (INDERAL LA) 160 MG 24 hr capsule Take 160 mg by mouth At Bedtime       testosterone (ANDROGEL 1.62 % PUMP) 20.25 MG/ACT gel Place 2 Pump onto the skin every morning       ubrogepant (UBRELVY) 50 MG tablet Take 50 mg by mouth at onset of headache PRN        venlafaxine (EFFEXOR-ER) 150 MG TB24 24 hr tablet Take 300 mg by mouth every morning       diphenhydrAMINE (BENADRYL) 25 MG capsule Take 25 mg by mouth as needed (Patient not taking: Reported on 7/17/2024)       fluconazole (DIFLUCAN) 150 MG tablet Take once orally and Repeat in 72 hours if symptoms are still present. (Patient not taking: Reported on 9/16/2024) 2 tablet 0     ketoconazole (NIZORAL) 2 % external shampoo APPLY TOPICALLY EVERY OTHER DAY TO WET SCALP AND BACK AND LET SIT FOR 3 TO5 MINUTES (Patient not taking: Reported on 7/17/2024) 120 mL 8     loratadine (CLARITIN) 10 MG tablet Take 10 mg by mouth every morning (Patient not taking: Reported on 7/17/2024)       methylphenidate HCl ER, OSM, (CONCERTA) 18 MG CR tablet Take 18 mg by mouth (Patient not taking: Reported on 7/17/2024)       OTHER MEDICAL SUPPLIES  (Patient not taking: Reported on 9/16/2024)       oxybutynin ER (DITROPAN XL) 5 MG 24 hr tablet Take 1 tablet (5 mg) by mouth daily (Patient not taking: Reported on 7/17/2024) 90 tablet 2     tacrolimus (PROTOPIC) 0.1 % external ointment Apply topically 2 times daily To rashes on the hands as needed. (Patient not taking: Reported on 7/17/2024) 60 g 3     Current Facility-Administered Medications   Medication Dose Route Frequency Provider Last Rate Last Admin     ciprofloxacin (CIPRO) tablet 500 mg  500 mg Oral Once Arden Mercedes MD          Past Medical History:   Patient Active Problem List   Diagnosis     Acute UTI     VERNON positive     Bilateral leg pain     Dysmenorrhea     Elevated CK     Mild intermittent asthma without complication     Narcolepsy and cataplexy     Panic     Urinary urgency     Constipation     Facial pain     Seasonal allergic rhinitis, unspecified trigger     Atopic dermatitis, unspecified type     Past Medical History:   Diagnosis Date     ADHD (attention deficit hyperactivity disorder)      VERNON positive      Anxiety      Asthma      Borderline personality disorder (H)       Concussion      Depression      Fibromyalgia      Gastroesophageal reflux disease with esophagitis      Gender dysphoria in adolescent and adult      Hypermobility of joint      Hypersomnia      Migraine      Mucous retention cyst of maxillary sinus      Obesity      PTSD (post-traumatic stress disorder)      Seasonal allergic rhinitis      Urinary frequency         CC No referring provider defined for this encounter. on close of this encounter.      Again, thank you for allowing me to participate in the care of your patient.      Sincerely,    Monserrat Celeste PA-C

## 2024-09-16 NOTE — PATIENT INSTRUCTIONS
Isotretinoin/Accutane      What is Isotretinoin?     Isotretinoin, more commonly known by its former brand name of  Accutane , is an oral treatment for acne derived from Vitamin A. It is the most effective acne treatment available and often results in a long-term remission or  cure . It has been used since the 1980s in various formulations. It is used to treat moderate or severe acne, or acne that is causing scars.     How does isotretinoin work?  Decreases the size of oil glands and oil production   Prevents growth of acne-causing bacteria   Allows the skin cells to mature more normally   Reduces skin inflammation     How long do I need to take isotretinoin?     Patients typically take the medicine for 6-8 months until reaching a weight-based  goal dose . Some people may need to take isotretinoin longer depending on their response to treatment. Always take isotretinoin with food because it needs the help from dietary fat to be absorbed.     What is  iPledge ?     iPledge website: ipledgeprogram.com     Babies born to mothers taking isotretinoin can have birth defects. The medicine is therefore regulated by a national program called  iPledge . There is no risk of birth defects in babies born to males taking isotretinoin. The birth defect risk for females lasts for one month after stopping the medication. There is no impact on fertility.     Patients are registered in iPledge under one of two categories:  1.  Patients who can get pregnant : Patients with functional female reproductive organs   2.  Patients who cannot get pregnant : Patients without functional female reproductive organs and patients with male reproductive organs    All patients:   To qualify for a prescription for isotretinoin we will need to enroll you in the iPledge program   You cannot share your medication   You cannot donate blood while taking isotretinoin and for one month after        Patients who can get pregnant:   You need to use two  forms or birth control or be abstinent from sexual activity   Women need to take two pregnancy tests at least 30 days apart prior to starting isotretinoin, and then a pregnancy test monthly   Each month you need to log in to the iPledge website to answer comprehension questions showing that you know to avoid pregnancy while taking isotretinoin.   After your clinic visit you will only have 7 days to  your prescription at the pharmacy. If you miss the pick-up window you will need to take another pregnancy test.     Do I need blood work?   Because isotretinoin can rarely cause changes in liver tests and lipid levels you will need initial lab monitoring for safety. In most cases, blood work is needed at baseline, and after dose increases.      *Patients of childbearing potential are required per the iPledge system, to complete a pregnancy test each month. Your prescribing provider will discuss more details about this with you.      Lab testing:   Labs should be collected at an [facility] location when possible. If you prefer to have them collected at a non-[facility] facility, please ensure that the results are faxed to our office at [phone]. Be aware there may be a delay in receiving results from outside clinics.      What are the side effects?   Almost everyone will have dry skin and lips when taking isotretinoin. Patients who wear contacts may especially notice more eye dryness. All side effects will stop when the isotretinoin is stopped. It s important to tell your doctor about side effects so that we can help to treat or prevent them.     Common:     Dryness: skin, eyes, nose, lips   Slight elevation of blood lipids (triglycerides)   Sun sensitivity   Skin fragility    Less common:   Headaches- contact clinic if severe and persistent   Muscle aches   Nausea   Nose bleeds   Decreased night vision    Very rare:  Changes in liver enzymes   Very high triglycerides        Troubleshooting tips for common  side effects:    Dry lips: Apply Vaseline or Aquaphor throughout the day and at bedtime.   Nosebleeds: Apply a small amount of Vaseline or Aquaphor just inside each nostril nightly at bedtime.   Dry skin: Use a mild gentle soap for bathing and a moisturizer daily. Avoid exfoliating the skin. Avoid acne washes.   Dry eyes: Use lubricating eye drops throughout the day. Decrease contact lens use.     What about mood changes?     You may have read that isotretinoin has been linked with mood changes, depression, and suicidality. A recent large study reviewing data linking isotretinoin and depression found no association (improvements in depression were actually noted). As this question has not been definitively answered we will continue to ask about your mood each month. Please stop the medication and call our clinic if you are noticing symptoms of increased sadness/depression. Seek immediate medical attention if you have thoughts of suicide or self-harm.     Commonly asked Questions:    Should I continue my other acne treatments while I take isotretinoin?   Please stop all other acne treatments. This includes oral antibiotics, acne creams, and acne washes. These may be too harsh for use with isotretinoin, causing extra skin dryness.     Females should continue birth control pills while on isotretinoin unless instructed to stop them.     What if I have problems getting my medicine at the pharmacy?  If you are not able to obtain your medicine from the pharmacy, please contact our clinic as soon as possible.     What if I missed my appointment?  We cannot dispense an isotretinoin refill if you have not been seen. Please contact the nursing line to coordinate an appointment.     What should I do if I forgot to take my medication?  It is ok to take a double dose the following day. If you have missed several days of medicine, notify your provider at your next appointment to make a plan.    What if I m going to run out of  medicine before my next appointment?  Going without the medicine for several days is not a concern. The medicine works in the long-term so this will not be a setback.     Contact info:  If you have any questions or concerns about your isotretinoin, please call the Division of Pediatric Dermatology at [facility] at *[phone]* during clinic hours. If you have questions or concerns over the weekend, a holiday or after clinic hours please call *[phone]* and ask for the Dermatology Resident on-call to be paged.

## 2024-09-19 ENCOUNTER — MYC REFILL (OUTPATIENT)
Dept: DERMATOLOGY | Facility: CLINIC | Age: 29
End: 2024-09-19
Payer: COMMERCIAL

## 2024-09-19 DIAGNOSIS — L74.519 FOCAL HYPERHIDROSIS: ICD-10-CM

## 2024-09-19 RX ORDER — OXYBUTYNIN CHLORIDE 10 MG/1
10 TABLET, EXTENDED RELEASE ORAL DAILY
Qty: 30 TABLET | Refills: 11 | Status: CANCELLED | OUTPATIENT
Start: 2024-09-19

## 2024-09-23 ENCOUNTER — TELEPHONE (OUTPATIENT)
Dept: DERMATOLOGY | Facility: CLINIC | Age: 29
End: 2024-09-23
Payer: COMMERCIAL

## 2024-09-23 NOTE — TELEPHONE ENCOUNTER
Left Voicemail (1st Attempt) for the patient to call back and schedule the following:    Appointment type: FOLLOW UP    Provider: Monserrat    Return date: 10/2024     Specialty phone number: [phone]    Additonal Notes: na

## 2024-11-15 ENCOUNTER — MEDICAL CORRESPONDENCE (OUTPATIENT)
Dept: HEALTH INFORMATION MANAGEMENT | Facility: CLINIC | Age: 29
End: 2024-11-15

## 2024-11-30 ENCOUNTER — HEALTH MAINTENANCE LETTER (OUTPATIENT)
Age: 29
End: 2024-11-30

## 2024-12-20 DIAGNOSIS — M25.59 PAIN IN OTHER JOINT: ICD-10-CM

## 2024-12-27 RX ORDER — FOLIC ACID 1 MG/1
1 TABLET ORAL DAILY
Qty: 90 TABLET | Refills: 1 | Status: SHIPPED | OUTPATIENT
Start: 2024-12-27

## 2024-12-28 RX ORDER — MULTIVITAMIN WITH IRON
500 TABLET ORAL DAILY
Qty: 90 TABLET | OUTPATIENT
Start: 2024-12-28

## 2024-12-28 NOTE — TELEPHONE ENCOUNTER
"Called and verified patient by last name and . Informed that folic acid was refilled by provider. Informed patient of message from provider below regarding Vitamin B12. Patient states \"that is why I wanted to get on gabapentin again\". Appears patient is referring to gabapentin being needed for continued joint pain off accutane but unclear and was unable to clarify further. Patient confirmed they have not taken accutane in a few months due to being out of medication. Does not appear that gabapentin was being prescribed by dermatology, advised patient discuss this with their primary care provider. Patient interested in starting accutane/follow-up on acne. Asked patient if they wanted an appointment asap for accutane or when they would like to schedule follow-up for. Patient states they don't have preference and just want to schedule an appointment. Scheduled patient for next available 2025 as no sooner openings in January with Monserrat Celeste PA-C. Added pt to waitlist with their consent. Patient acknowledged time/date/provider of appointment and had no further questions or concerns.    Donet Aguilar, EMT  "

## 2024-12-28 NOTE — TELEPHONE ENCOUNTER
I refused the B12 as this was used for joint pain while on isotretinoin. Gregg is no longer on this medication, so it shouldn't be needed. If Gregg is still experiencing joint pain, Gregg should have an evalution with a primary provided.  Thanks,  Monserrat Celeste PA-C

## 2024-12-28 NOTE — TELEPHONE ENCOUNTER
Medication Requested:   Disp Refills Start End ANA   folic acid (FOLVITE) 1 MG tablet 90 tablet 1 6/20/2024 -- No   Sig - Route: Take 1 tablet (1 mg) by mouth daily - Oral      Disp Refills Start End ANA   cyanocobalamin (VITAMIN B-12) 500 MCG tablet 90 tablet 1 6/20/2024 -- No   Sig - Route: Take 1 tablet (500 mcg) by mouth daily - Oral     ----------------------  Last Office Visit : 9/16/2024  [facility] Office visit:  0  ----------------------    Refill decision: Medication refilled per protocol: folic acid    Refill decision: Refill pended and routed to the provider for review/determination due to the following criteria not met:     B12  -Medication not on Dermatology refill protocol      Pass/Fail Protocol Criteria:  Vitamin Supplements (Adult)  Process #1   ONLY Vit D <50,000 units    Folic Acid    Derm-Allergy Refill   Process #1    -Refill qty to 12 months from last clinic visit   -Refill with or without scheduled appointment, Refuse if over 12 months.    -No need to contact Clinic Coordinators about appointment unless over 12 mo.

## 2025-01-20 ENCOUNTER — PRE VISIT (OUTPATIENT)
Dept: UROLOGY | Facility: CLINIC | Age: 30
End: 2025-01-20
Payer: COMMERCIAL

## 2025-01-20 NOTE — TELEPHONE ENCOUNTER
Reason for visit: Multiple kidney infections     Relevant information: Appointment made by patient. Last seen by Dr. Bowen 4/28/22 for somatic dysfunction of pelvis region, urgency-frequency syndrome, pelvic pain. Legal sex-Male.     Records/imaging/labs/orders: All records available    Pt called: no need for a call    At Rooming: Have patient empty their bladder and PVR. Get a urine sample and dip the urine if they have UTI symptoms.    Carlota Lopez  1/20/2025  9:54 AM

## 2025-01-23 ENCOUNTER — OFFICE VISIT (OUTPATIENT)
Dept: UROLOGY | Facility: CLINIC | Age: 30
End: 2025-01-23
Payer: COMMERCIAL

## 2025-01-23 VITALS
SYSTOLIC BLOOD PRESSURE: 126 MMHG | OXYGEN SATURATION: 98 % | HEART RATE: 76 BPM | HEIGHT: 65 IN | BODY MASS INDEX: 35.16 KG/M2 | DIASTOLIC BLOOD PRESSURE: 80 MMHG | WEIGHT: 211 LBS

## 2025-01-23 DIAGNOSIS — N32.81 OAB (OVERACTIVE BLADDER): Primary | ICD-10-CM

## 2025-01-23 DIAGNOSIS — L74.519 FOCAL HYPERHIDROSIS: ICD-10-CM

## 2025-01-23 RX ORDER — OXYBUTYNIN CHLORIDE 10 MG/1
10 TABLET, EXTENDED RELEASE ORAL DAILY
Qty: 90 TABLET | Refills: 3 | Status: SHIPPED | OUTPATIENT
Start: 2025-01-23

## 2025-01-23 RX ORDER — ESTRADIOL 0.1 MG/G
0.5 CREAM VAGINAL WEEKLY
COMMUNITY
Start: 2024-08-16

## 2025-01-23 ASSESSMENT — PAIN SCALES - GENERAL: PAINLEVEL_OUTOF10: NO PAIN (0)

## 2025-01-23 NOTE — PATIENT INSTRUCTIONS
Continue the medications    It was a pleasure meeting with you today.  Thank you for allowing me and my team the privilege of caring for you today.  YOU are the reason we are here, and I truly hope we provided you with the excellent service you deserve.  Please let us know if there is anything else we can do for you so that we can be sure you are leaving completely satisfied with your care experience.

## 2025-01-23 NOTE — LETTER
1/23/2025       RE: Gregg Maharaj  [address] [place] [address]  [address]  [place] [address]     Dear Colleague,    Thank you for referring your patient, Gregg Maharaj, to the [facility] [place] at [facility]. Please see a copy of my visit note below.    January 23, 2025    Gregg was seen today for kidney infection.    Diagnoses and all orders for this visit:    OAB (overactive bladder)    Focal hyperhidrosis  -     oxyBUTYnin ER (DITROPAN XL) 10 MG 24 hr tablet; Take 1 tablet (10 mg) by mouth daily.      Gregg is a 28 y/o transgender male with a history of epilepsy, hypermobility, and interstitial cystitis who presents for follow-up of overactive bladder. He continues to have bladder pain and frequency related to interstitial cystitis. Oxybutynin has been effective in controlling symptoms historically. Will restart oxybutynin 10 mg daily for OAB and hyperhydrosis.   - Restart oxybutynin 10 mg daily  - Nurse visit for PVR in 3-6 months  - 1 yr follow-up w/ Dr. Bowen or LORENZO    Micaela Lynn, MS4    Attestation:  I agree with the PFSH and ROS as completed by the MS, except for changes made as needed.  The remainder of the encounter was performed by me and scribed by the MS.  The scribed note accurately reflects my personal services and the decisions made by me.    8 minutes were spent today on the day of the encounter in reviewing the EMR, direct patient care including prescription medications, coordination of care and documentation    Lani Bowen MD MPH  (she/her/hers)   of Urology  [facility]    Subjective    Gregg is a 28 y/o transgender male with a history of epilepsy, hypermobility, and interstitial cystitis who presents for follow-up of overactive bladder. In September 2024 he had a UTI that progressed to pyelonephritis. Expresses concern that he will mistake another UTI for IC symptoms and would like to  "restart oxybutynin. Has tried PFPT in the past. This was somewhat helpful but he is not interested in repeating.  Reports frequent dyspareunia that he relates to pelvic floor laxity. Uses estradiol ointment once per week which has helped. Denies dysuria, malodorous urine, or hematuria.     /80   Pulse 76   Ht 1.651 m (5' 5\")   Wt 95.7 kg (211 lb)   SpO2 98%   BMI 35.11 kg/m    GENERAL: healthy, alert and no distress  EYES: Eyes grossly normal to inspection, conjunctivae and sclerae normal  HENT: normal cephalic/atraumatic.  External ears, nose and mouth without ulcers or lesions.  RESP: no audible wheeze, cough, or visible cyanosis.  No visible retractions or increased work of breathing.  Able to speak fully in complete sentences.  NEURO: Cranial nerves grossly intact, mentation intact and speech normal  PSYCH: mentation appears normal, affect normal/bright, judgement and insight intact, normal speech and appearance well-groomed    PVR 40 mL by bladder scan    CC  Patient Care Team:  Jessica Cardoso as PCP - General (Internal Medicine)  Anita Grant MD as MD (Plastic Surgery)  Marnie Stout PA as Physician Assistant (Urology)  Usama Jackson MD as MD (Dermatology)  Monserrat Celeste PA-C as Physician Assistant (Dermatology)  [facility]  (Plastic Surgery)  Dina Carrillo PA-C as Physician Assistant (Anesthesiology)  Greta Cannon PA-C as Assigned Endocrinology Provider  Carla Cunningham, PhD as Assigned Behavioral Health Provider  Faizan Yanez MD as MD (Otolaryngology)  Monserrat Celeste PA-C as Assigned Surgical Provider  SELF, REFERRED        Again, thank you for allowing me to participate in the care of your patient.      Sincerely,    Lani Bowen MD    "

## 2025-01-23 NOTE — NURSING NOTE
"Chief Complaint   Patient presents with    Kidney Infection     Started estrogen cream. Has hyperhydrosis and IC. Wants oxybutynin        Blood pressure 126/80, pulse 76, height 1.651 m (5' 5\"), weight 95.7 kg (211 lb), SpO2 98%, not currently breastfeeding. Body mass index is 35.11 kg/m .    Patient Active Problem List   Diagnosis    Acute UTI    VERNON positive    Bilateral leg pain    Dysmenorrhea    Elevated CK    Mild intermittent asthma without complication    Narcolepsy and cataplexy    Panic    Urinary urgency    Constipation    Facial pain    Seasonal allergic rhinitis, unspecified trigger    Atopic dermatitis, unspecified type       Allergies   Allergen Reactions    Capsaicin Anaphylaxis    Capsicum Annuum Extract & Derivative (Bell Pepper) [Capsicum] Anaphylaxis    Food Shortness Of Breath     Red chili peppers (harissa paste)    No Clinical Screening - See Comments Anaphylaxis, Dermatitis, Hives, Itching, Rash and Shortness Of Breath     Cotton seed oil  Cotton seed oil    Adhesive Tape      Other reaction(s): Atopic Dermatitis ALSO DERMABOND - PT HAD HIVES  Other reaction(s): Atopic Dermatitis  Other reaction(s): Atopic Dermatitis  ALSO PT REACTED TO ABSORBABLE SUTURES - BODY REJECTION, PUS, SWELLING AND REDNESS.  Allergic to adhesive tape that is not silicone.     Benadryl [Diphenhydramine] Hallucination     Auditory hallucination from benadryl toxicity    Fexofenadine Hives    Kiwi     Other Environmental Allergy Hives     Cotton seed oil    Tomato     Metoclopramide Anxiety     Makes pt feel claustrophobic    Oxycodone Other (See Comments)     Dysphoria and insomnia       Current Outpatient Medications   Medication Sig Dispense Refill    ACCU-CHEK GUIDE test strip TEST ONCE DAILY OR WITH SYMPTOMS. Follow up visit needed. Call  to schedule. 100 strip 1    albuterol (PROAIR HFA/PROVENTIL HFA/VENTOLIN HFA) 108 (90 Base) MCG/ACT inhaler Inhale 2 puffs into the lungs as needed      azelastine " (ASTELIN) 0.1 % nasal spray USE 1 TO 2 SPRAYS IN EACH NOSTRIL 1 TO 2 TIMES DAILY AS NEEDED      baclofen (LIORESAL) 10 MG tablet TAKE 1 TO 2 TABLETS BY MOUTH EVERY NIGHT      cetirizine (ZYRTEC) 10 MG tablet Take 10 mg by mouth daily Pt takes at HS      CONCERTA 54 MG CR tablet       Continuous Blood Gluc Sensor (FREESTYLE JOSSY 2 SENSOR) MISC Change SENSOR EVERY 14 DAYS. Follow up visit needed. Call  to schedule. 2 each 3    cyanocobalamin (VITAMIN B-12) 500 MCG tablet Take 1 tablet (500 mcg) by mouth daily 90 tablet 1    EPINEPHrine (ANY BX GENERIC EQUIV) 0.3 MG/0.3ML injection 2-pack Inject into the lateral thigh as needed for life threatening allergic reactions.      fluticasone (FLONASE) 50 MCG/ACT nasal spray Spray 2 sprays into both nostrils 2 times daily 16 g 11    folic acid (FOLVITE) 1 MG tablet Take 1 tablet (1 mg) by mouth daily. 90 tablet 1    gabapentin (NEURONTIN) 300 MG capsule Take 300-600 mg by mouth 3 times daily as needed 900 mg at night      ibuprofen (ADVIL/MOTRIN) 200 MG capsule Take by mouth as needed      ketoconazole (NIZORAL) 2 % external shampoo APPLY TOPICALLY EVERY OTHER DAY TO WET SCALP AND BACK AND LET SIT FOR 3 TO5 MINUTES 120 mL 8    levETIRAcetam (KEPPRA) 500 MG tablet Take 1,500 mg by mouth 2 times daily.      loratadine (CLARITIN) 10 MG tablet Take 10 mg by mouth every morning.      LORazepam (ATIVAN) 0.5 MG tablet Take 0.5 mg by mouth daily. Take 1-2 up to 2 times daily for seizure      metFORMIN (GLUCOPHAGE) 500 MG tablet Take 1 tablet (500 mg) by mouth daily (with breakfast) 90 tablet 3    montelukast (SINGULAIR) 10 MG tablet Take 10 mg by mouth At Bedtime      propranolol ER (INDERAL LA) 160 MG 24 hr capsule Take 160 mg by mouth At Bedtime      testosterone (ANDROGEL 1.62 % PUMP) 20.25 MG/ACT gel Place 2 Pump onto the skin every morning      ubrogepant (UBRELVY) 50 MG tablet Take 50 mg by mouth at onset of headache PRN      venlafaxine (EFFEXOR-ER) 150 MG TB24 24  hr tablet Take 300 mg by mouth every morning      ISOtretinoin (ACCUTANE) 10 MG capsule Take 1 capsule (10 mg) by mouth daily. (Patient not taking: Reported on 1/23/2025) 30 capsule 0    lamoTRIgine (LAMICTAL) 25 MG tablet Take 25 mg by mouth daily. Take 1 tablet daily for 14 days, then increase to 2 tablets daily (Patient not taking: Reported on 1/23/2025)      methylphenidate HCl ER, OSM, (CONCERTA) 18 MG CR tablet Take 18 mg by mouth (Patient not taking: Reported on 7/17/2024)      OTHER MEDICAL SUPPLIES  (Patient not taking: Reported on 1/23/2025)      oxyBUTYnin ER (DITROPAN XL) 10 MG 24 hr tablet Take 1 tablet (10 mg) by mouth daily (Patient not taking: Reported on 1/23/2025) 30 tablet 11    oxybutynin ER (DITROPAN XL) 5 MG 24 hr tablet Take 1 tablet (5 mg) by mouth daily (Patient not taking: Reported on 7/17/2024) 90 tablet 2       Social History     Tobacco Use    Smoking status: Never     Passive exposure: Never    Smokeless tobacco: Never   Vaping Use    Vaping status: Never Used       Carlota Lopez  1/23/2025  11:16 AM

## 2025-01-23 NOTE — PROGRESS NOTES
January 23, 2025    Gregg was seen today for kidney infection.    Diagnoses and all orders for this visit:    OAB (overactive bladder)    Focal hyperhidrosis  -     oxyBUTYnin ER (DITROPAN XL) 10 MG 24 hr tablet; Take 1 tablet (10 mg) by mouth daily.      Gregg is a 30 y/o transgender male with a history of epilepsy, hypermobility, and interstitial cystitis who presents for follow-up of overactive bladder. He continues to have bladder pain and frequency related to interstitial cystitis. Oxybutynin has been effective in controlling symptoms historically. Will restart oxybutynin 10 mg daily for OAB and hyperhydrosis.   - Restart oxybutynin 10 mg daily  - Nurse visit for PVR in 3-6 months  - 1 yr follow-up w/ Dr. Bowen or LORENZO    Micaela Lynn, MS4    Attestation:  I agree with the PFSH and ROS as completed by the MS, except for changes made as needed.  The remainder of the encounter was performed by me and scribed by the MS.  The scribed note accurately reflects my personal services and the decisions made by me.    8 minutes were spent today on the day of the encounter in reviewing the EMR, direct patient care including prescription medications, coordination of care and documentation    Lani Bowen MD MPH  (she/her/hers)   of Urology  [facility]    Subjective    Gregg is a 30 y/o transgender male with a history of epilepsy, hypermobility, and interstitial cystitis who presents for follow-up of overactive bladder. In September 2024 he had a UTI that progressed to pyelonephritis. Expresses concern that he will mistake another UTI for IC symptoms and would like to restart oxybutynin. Has tried PFPT in the past. This was somewhat helpful but he is not interested in repeating.  Reports frequent dyspareunia that he relates to pelvic floor laxity. Uses estradiol ointment once per week which has helped. Denies dysuria, malodorous urine, or hematuria.     /80   Pulse 76   Ht  "1.651 m (5' 5\")   Wt 95.7 kg (211 lb)   SpO2 98%   BMI 35.11 kg/m    GENERAL: healthy, alert and no distress  EYES: Eyes grossly normal to inspection, conjunctivae and sclerae normal  HENT: normal cephalic/atraumatic.  External ears, nose and mouth without ulcers or lesions.  RESP: no audible wheeze, cough, or visible cyanosis.  No visible retractions or increased work of breathing.  Able to speak fully in complete sentences.  NEURO: Cranial nerves grossly intact, mentation intact and speech normal  PSYCH: mentation appears normal, affect normal/bright, judgement and insight intact, normal speech and appearance well-groomed    PVR 40 mL by bladder scan    CC  Patient Care Team:  Jessica Cardoso as PCP - General (Internal Medicine)  Anita Grant MD as MD (Plastic Surgery)  Marnie Stout PA as Physician Assistant (Urology)  Usama Jackson MD as MD (Dermatology)  Monserrat Celsete PA-C as Physician Assistant (Dermatology)  [facility]  (Plastic Surgery)  Dina Carrillo PA-C as Physician Assistant (Anesthesiology)  Greta Cannon PA-C as Assigned Endocrinology Provider  Carla Cunningham, PhD as Assigned Behavioral Health Provider  Faizan Yanez MD as MD (Otolaryngology)  Monserrat Celeste PA-C as Assigned Surgical Provider  SELF, REFERRED      "

## 2025-01-23 NOTE — NURSING NOTE
Patient did not want to urinate as he had gone about 60 min prior.    PVR x 3 scans- 40 mL    Patient stated he had no urge to urinate.    Carlota Lopez on 1/23/2025 at 11:29 AM

## 2025-04-22 ENCOUNTER — TELEPHONE (OUTPATIENT)
Dept: CONSULT | Facility: CLINIC | Age: 30
End: 2025-04-22
Payer: COMMERCIAL

## 2025-04-22 NOTE — TELEPHONE ENCOUNTER
"Referral received for patient to be seen in Genetics clinic for \"mitochondrial mutation found on testing through [facility]\". Reached out to referring provider's office and asked that they send over genetic test results in order to ensure that patient is scheduled appropriately.  "

## 2025-04-23 ENCOUNTER — VIRTUAL VISIT (OUTPATIENT)
Dept: PSYCHOLOGY | Facility: CLINIC | Age: 30
End: 2025-04-23
Payer: COMMERCIAL

## 2025-04-23 DIAGNOSIS — G89.29 OTHER CHRONIC PAIN: ICD-10-CM

## 2025-04-23 DIAGNOSIS — G40.109 TEMPORAL LOBE EPILEPSY (H): ICD-10-CM

## 2025-04-23 DIAGNOSIS — F43.10 PTSD (POST-TRAUMATIC STRESS DISORDER): Primary | ICD-10-CM

## 2025-04-23 DIAGNOSIS — E16.1 HYPERINSULINEMIA: ICD-10-CM

## 2025-04-23 DIAGNOSIS — R45.89 EMOTIONAL DYSREGULATION: ICD-10-CM

## 2025-04-23 ASSESSMENT — ANXIETY QUESTIONNAIRES
7. FEELING AFRAID AS IF SOMETHING AWFUL MIGHT HAPPEN: NOT AT ALL
2. NOT BEING ABLE TO STOP OR CONTROL WORRYING: SEVERAL DAYS
GAD7 TOTAL SCORE: 8
GAD7 TOTAL SCORE: 8
7. FEELING AFRAID AS IF SOMETHING AWFUL MIGHT HAPPEN: NOT AT ALL
IF YOU CHECKED OFF ANY PROBLEMS ON THIS QUESTIONNAIRE, HOW DIFFICULT HAVE THESE PROBLEMS MADE IT FOR YOU TO DO YOUR WORK, TAKE CARE OF THINGS AT HOME, OR GET ALONG WITH OTHER PEOPLE: SOMEWHAT DIFFICULT
5. BEING SO RESTLESS THAT IT IS HARD TO SIT STILL: SEVERAL DAYS
4. TROUBLE RELAXING: MORE THAN HALF THE DAYS
3. WORRYING TOO MUCH ABOUT DIFFERENT THINGS: SEVERAL DAYS
1. FEELING NERVOUS, ANXIOUS, OR ON EDGE: SEVERAL DAYS
GAD7 TOTAL SCORE: 8
6. BECOMING EASILY ANNOYED OR IRRITABLE: MORE THAN HALF THE DAYS
8. IF YOU CHECKED OFF ANY PROBLEMS, HOW DIFFICULT HAVE THESE MADE IT FOR YOU TO DO YOUR WORK, TAKE CARE OF THINGS AT HOME, OR GET ALONG WITH OTHER PEOPLE?: SOMEWHAT DIFFICULT

## 2025-04-23 NOTE — Clinical Note
Francisco J Maria,   Can you add the complexity code for this visit for the need to manage maladaptive communication for high reactivity? Thank you!  Carla Cunningham, Ph.D., , [facility] Clinical Health Psychologist Phone: [phone]  4/23/2025 5:11 PM

## 2025-04-23 NOTE — PROGRESS NOTES
Health Psychology          Sonali Morris, Ph.D.,  [phone]  Anastasiya Pelayo, Ph.D.,  [phone]  Tad Mobley, Ph.D.,  [phone]  Brandie Daniels, Ph.D.,  [phone]  Chin Mai, Ph.D., , [facility] [phone]  Isaura Hatch, Ph.D.,  [phone]  Carla Cunningham, Ph.D., , [facility] [phone]    [facility], 3rd Floor  [address] [place] [address]       Health Psychology Progress Note    Patient Name: Gregg Maharaj    Service Type: Individual  Length of Visit: 62 minutes - extended session due to complexity of case and re-establishing care  Start time: 1:02 PM  Stop time: 2:04 PM  Attendees: patient attended alone  Session #: 1 (new episode of care)    Telemedicine Information:     Telemedicine Visit: The patient's condition can be safely assessed and treated via synchronous audio and visual telemedicine encounter.    Reason for Telemedicine Visit: Patient has requested telehealth visit  Originating Site (Patient Location): Patient's home  Distant Location (provider location):  On-site: [facility]  Consent:  The patient/guardian has verbally consented to: the potential risks and benefits of telemedicine (video visit) versus in person care; bill my insurance or make self-payment for services provided; and responsibility for payment of non-covered services.   Mode of Communication:  Video Conference via Register My InfoÂ®  As the provider I attest to compliance with applicable laws and regulations related to telemedicine.     Identifying Information and Presenting Problem:  The patient is a 29 year old adult who is being seen for problematic symptoms of distress related to chronic illness.     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 He also has mental health diagnoses of PTSD, ADHD, anxiety, panic disorder, depression, psychogenic, non-epileptic seizures, ARFID, and borderline personality disorder. He was previously seen for a health psychology intervention to facilitate coping with chronic medical concerns. He was discharged in July 2024 upon successfully meeting treatment goals. He recently requested an appointment with me because he would like to revisit Acceptance and Commitment Therapy. He reports that he has recently undergone several medication, medical, and mental health changes over the past several months. Also reports that he was diagnosed with autism after undergoing evaluation at [facility]. Notes that he has been trying to explore his identity and doing a lot of soul searching. Reports an increase in suicidal ideation, self-harm, and recent suicide plan on April 17th where he was considering jumping out the window. Connected with mother, which interrupted attempt. No current active SI, plans, or intent. Continues with psychotherapy and psychiatric medication management with his primary mental health team. He states that his goal for returning for a health psychology intervention to revisit Acceptance and Commitment Therapy as a way of helping him live better with his chronic health concerns. Overall goals for treatment include identifying triggers for and preventing seizures (triggers include hypoglycemia, loud noises, physical and emotional overstimulation, and pain), increasing participation in meaningful/purposeful activities, increasing interdependence, improving dietary habits, increasing physical activity/movement, and BG regulation.    Skills/intervention: Provided psychoeducation about ACT therapeutic framework. Watched intro to ACT video and assisted  "patient in identifying towards moves, away moves, and hooks. Examples of his towards moves include knitting, crocheting, grooming/hygiene, eating regularly, and getting out of the house. Examples of his away moves include excessive self-isolation, overworking self, refusing to accept/ask for help, over-committing, not setting unhealthy limits/boundaries, self-harm, and shutting down. Examples of his hooks include \"I knew it, I never should have shown that vulnerability,\" \"I should have just done it myself,\" self-loathing, shame, frustration, defensiveness, insecurity, self-consciousness. Revisited ACT principle of values clarification. Also explored safety planning. Patient states that he discussed safety steps with his partner but does not have a formal written plan. Patient became agitated and frustrated toward the end of the session. Discussed ways in which we could create the conditions for helpful communication that is supportive while also continuing to complete a course of treatment with a focus on ACT. Patient apologized and expressed that he is having a hard time forming his words and communicating effectively. He was open to and agreeable to completing a safety plan worksheet prior to our next session.        Treatment Objective(s) Addressed in This Session:  Psychological distress related to Chronic Disease Management    Progress on / Status of Treatment Objective(s) / Homework:  New Objective established this session     Medication Adherence: Patient did not report medication changes. Current medication list is below:     Current Outpatient Medications   Medication Sig Dispense Refill    ACCU-CHEK GUIDE test strip TEST ONCE DAILY OR WITH SYMPTOMS. Follow up visit needed. Call  to schedule. 100 strip 1    albuterol (PROAIR HFA/PROVENTIL HFA/VENTOLIN HFA) 108 (90 Base) MCG/ACT inhaler Inhale 2 puffs into the lungs as needed      azelastine (ASTELIN) 0.1 % nasal spray USE 1 TO 2 SPRAYS IN EACH " NOSTRIL 1 TO 2 TIMES DAILY AS NEEDED      baclofen (LIORESAL) 10 MG tablet TAKE 1 TO 2 TABLETS BY MOUTH EVERY NIGHT      cetirizine (ZYRTEC) 10 MG tablet Take 10 mg by mouth daily Pt takes at HS      CONCERTA 54 MG CR tablet       Continuous Blood Gluc Sensor (FREESTYLE JOSSY 2 SENSOR) MISC Change SENSOR EVERY 14 DAYS. Follow up visit needed. Call  to schedule. 2 each 3    cyanocobalamin (VITAMIN B-12) 500 MCG tablet Take 1 tablet (500 mcg) by mouth daily 90 tablet 1    EPINEPHrine (ANY BX GENERIC EQUIV) 0.3 MG/0.3ML injection 2-pack Inject into the lateral thigh as needed for life threatening allergic reactions.      estradiol (ESTRACE) 0.1 MG/GM vaginal cream 0.5 g once a week.      fluticasone (FLONASE) 50 MCG/ACT nasal spray Spray 2 sprays into both nostrils 2 times daily 16 g 11    folic acid (FOLVITE) 1 MG tablet Take 1 tablet (1 mg) by mouth daily. 90 tablet 1    gabapentin (NEURONTIN) 300 MG capsule Take 300-600 mg by mouth 3 times daily as needed 900 mg at night      ibuprofen (ADVIL/MOTRIN) 200 MG capsule Take by mouth as needed      ISOtretinoin (ACCUTANE) 10 MG capsule Take 1 capsule (10 mg) by mouth daily. (Patient not taking: Reported on 1/23/2025) 30 capsule 0    ketoconazole (NIZORAL) 2 % external shampoo APPLY TOPICALLY EVERY OTHER DAY TO WET SCALP AND BACK AND LET SIT FOR 3 TO5 MINUTES 120 mL 8    lamoTRIgine (LAMICTAL) 25 MG tablet Take 25 mg by mouth daily. Take 1 tablet daily for 14 days, then increase to 2 tablets daily (Patient not taking: Reported on 1/23/2025)      levETIRAcetam (KEPPRA) 500 MG tablet Take 1,500 mg by mouth 2 times daily.      loratadine (CLARITIN) 10 MG tablet Take 10 mg by mouth every morning.      LORazepam (ATIVAN) 0.5 MG tablet Take 0.5 mg by mouth daily. Take 1-2 up to 2 times daily for seizure      metFORMIN (GLUCOPHAGE) 500 MG tablet Take 1 tablet (500 mg) by mouth daily (with breakfast) 90 tablet 3    methylphenidate HCl ER, OSM, (CONCERTA) 18 MG CR  tablet Take 18 mg by mouth (Patient not taking: Reported on 7/17/2024)      montelukast (SINGULAIR) 10 MG tablet Take 10 mg by mouth At Bedtime      OTHER MEDICAL SUPPLIES  (Patient not taking: Reported on 1/23/2025)      oxyBUTYnin ER (DITROPAN XL) 10 MG 24 hr tablet Take 1 tablet (10 mg) by mouth daily. 90 tablet 3    propranolol ER (INDERAL LA) 160 MG 24 hr capsule Take 160 mg by mouth At Bedtime      testosterone (ANDROGEL 1.62 % PUMP) 20.25 MG/ACT gel Place 2 Pump onto the skin every morning      ubrogepant (UBRELVY) 50 MG tablet Take 50 mg by mouth at onset of headache PRN      venlafaxine (EFFEXOR-ER) 150 MG TB24 24 hr tablet Take 300 mg by mouth every morning       Current Facility-Administered Medications   Medication Dose Route Frequency Provider Last Rate Last Admin    ciprofloxacin (CIPRO) tablet 500 mg  500 mg Oral Once Arden Mercedes MD             Assessment: The patient appeared to be active and engaged in today's session and was receptive to feedback.    Mental Status Exam:   Appearance: Appropriate   Eye Contact: Good    Orientation: Yes, x4  Behavior/relationship to provider/demeanor: Cooperative, Engaged, at times Agitated, and Guarded  Motor Activity: normal or unremarkable  Mood (subjective report): Depressed, Irritable, Anxious  Affect (objective appearance): Variable  Speech Rate: Normal  Speech Volume: Normal  Speech Articulation: Normal  Speech Coherence: Normal  Speech Spontaneity: Normal  Thought Content: passive SI, denies active SI, plans, or intent  Thought Process (associations): Logical, Linear, and Goal directed  Thought Process (rate): Normal  Abnormal Perception: None  Attention/Concentration: Normal  Memory: Appears grossly intact   Fund of Knowledge: Appears within normal limits  Abstraction:  Normal  Insight:  Good  Judgment: Adequate    Does the patient appear to be at imminent risk of harm to self/others at this time? No    The session was necessary to address distress  related to chronic illness management that has been affecting patient's quality of life. Ongoing psychotherapy is necessary to stabilize mood, provide counseling, improve functioning with daily activities, provide psychoeducation, provide support, and teach cognitive and behavioral skills.    Diagnoses:    1. PTSD (post-traumatic stress disorder)    2. Emotional dysregulation    3. Temporal lobe epilepsy (H)    4. Other chronic pain    5. Hyperinsulinemia        Plan:    Follow-up video visit with me on 5/14/25 at 3:00pm  Patient to use 911 or other crisis resources in the event of a psychiatric emergency  Primary care needs and medications are managed by Jessica Cardoso MD at [facility]. Referring provider is: self from previous course of treatment  Primary mental health needs are managed by MARINA Zhao (therapist) and psychiatric medication management at [facility].  Next visit agenda/goals:   Complete values clarification exercise  Review safety plan content and complete safety plan worksheet    NOTE: Treatment plan due in future session    INTERACTIVE COMPLEXITY: Need to manage maladaptive communication related to high reactivity    Carla Cunningham, Ph.D., , [facility]  Clinical Health Psychologist  Phone: [phone]   4/23/2025 1:02 PM

## 2025-04-23 NOTE — NURSING NOTE
Current patient location: [address] [place] [address]  [address] [place] [address]    Is the patient currently in the state of [place]? YES    Visit mode: VIDEO    If the visit is dropped, the patient can be reconnected by:VIDEO VISIT: Text to cell phone:   Telephone Information:   Mobile [phone]    and VIDEO VISIT: Send to e-mail at: [email or web address]    Will anyone else be joining the visit? NO  (If patient encounters technical issues they should call [phone])    Are changes needed to the allergy or medication list? No    Are refills needed on medications prescribed by this physician? NO    Rooming Documentation:  Not applicable    Reason for visit: RECHECK    Betty BURCH

## 2025-05-14 ENCOUNTER — VIRTUAL VISIT (OUTPATIENT)
Dept: PSYCHOLOGY | Facility: CLINIC | Age: 30
End: 2025-05-14
Payer: COMMERCIAL

## 2025-05-14 DIAGNOSIS — G40.109 TEMPORAL LOBE EPILEPSY (H): ICD-10-CM

## 2025-05-14 DIAGNOSIS — F43.10 PTSD (POST-TRAUMATIC STRESS DISORDER): Primary | ICD-10-CM

## 2025-05-14 DIAGNOSIS — E16.1 HYPERINSULINEMIA: ICD-10-CM

## 2025-05-14 DIAGNOSIS — R45.89 EMOTIONAL DYSREGULATION: ICD-10-CM

## 2025-05-14 DIAGNOSIS — G89.29 OTHER CHRONIC PAIN: ICD-10-CM

## 2025-05-14 NOTE — NURSING NOTE
Is the patient currently in the state of [place]? YES    Current patient location: [address] [place] [address]  [address] [place] [address]    Visit mode:Video    If the visit is dropped, the patient can be reconnected by: VIDEO VISIT: Text to cell phone:   Telephone Information:   Mobile [phone]       Will anyone else be joining the visit? No  (If patient encounters technical issues they should call [phone])    Are changes needed to the allergy or medication list? N/A    Are refills needed on medications prescribed by this physician? No    Rooming Documentation: Questionnaire(s) not assigned, not done per department protocol.    Reason for visit: RECHECK     Kami Wyatt, BAMF

## 2025-05-14 NOTE — Clinical Note
Francisco J Maria,   Can you please add the interactive complexity code to this session?  Thank you!  Carla Cunningham, Ph.D., , [facility] Clinical [facility] Psychologist Phone: [phone]  5/14/2025 4:34 PM

## 2025-05-19 NOTE — TELEPHONE ENCOUNTER
"Reason for visit: Follow up PVR - 3-6 months.     Relevant information: Patient last seen by Dr. Bowen on 1/23/25. Plan of care   \"- Restart oxybutynin 10 mg daily  - Nurse visit for PVR in 3-6 months  - 1 yr follow-up w/ Dr. Bowen or LORENZO\"     Records/imaging/labs/orders: IN [facility], CARE EVERYWHERE, AND PACS     At Rooming:   Standard rooming    Erin English  5/19/2025  9:07 AM  "

## 2025-05-23 ENCOUNTER — PRE VISIT (OUTPATIENT)
Dept: UROLOGY | Facility: CLINIC | Age: 30
End: 2025-05-23

## 2025-06-30 DIAGNOSIS — M25.59 PAIN IN OTHER JOINT: ICD-10-CM

## 2025-07-03 RX ORDER — FOLIC ACID 1 MG/1
1000 TABLET ORAL DAILY
Qty: 90 TABLET | Refills: 0 | Status: SHIPPED | OUTPATIENT
Start: 2025-07-03

## 2025-07-04 NOTE — TELEPHONE ENCOUNTER
Last Written Prescription:  12/27/24  90 : 1  ----------------------  Last Visit Date: 9/16/24  Future Visit Date: 9/15/25  ----------------------      Refill decision:   [x] Medication refilled per  Medication Refill in Ambulatory Care  policy.    Request from pharmacy:  Requested Prescriptions   Pending Prescriptions Disp Refills    folic acid (FOLVITE) 1 MG tablet [Pharmacy Med Name: FOLIC ACID 1MG TABLETS] 90 tablet 1     Sig: TAKE 1 TABLET(1 MG) BY MOUTH DAILY       Vitamin Supplements Protocol Failed - 7/3/2025 10:47 PM        Failed - Medication indicated for associated diagnosis     The medication is prescribed for one or more of the following conditions:     Vitamin deficiency   Osteopenia   Osteoporosis   Nutrition counseling   Nutrition education   Feeding ability finding   Bone density finding   Morbid Obesity   History of bypass of stomach   Idiopathic peripheral neuropathy   General examination of patient   Vitamin D deficiency   Folate deficiency anemia due to dietary causes   Sleeve resection of stomach   Rheumatoid factor level - finding   Crohn's disease   Psoriatic arthritis   Transplant of Kidney   Arthropathy   Alcoholism   Malabsorption syndrome   Disorder of bone and articular cartilage   Cystic Fibrosis  Bronchiectasis            Passed - The patient does not have an order for high-dose vitamin D        Passed - Medication is active on med list and the sig matches. RN to manually verify dose and sig if red X/fail.     If the protocol passes (green check), you do not need to verify med dose and sig.    A prescription matches if they are the same clinical intention.    For Example: once daily and every morning are the same.    The protocol can not identify upper and lower case letters as matching and will fail.     For Example: Take 1 tablet (50 mg) by mouth daily     TAKE 1 TABLET (50 MG) BY MOUTH DAILY    For all fails (red x), verify dose and sig.    If the refill does match what is on file,  the RN can still proceed to approve the refill request.       If they do not match, route to the appropriate provider.             Passed - The patient is 2 years of age or older        Passed - Recent (12 month) or future (90 days) visit with authorizing provider's specialty (provided they have been seen in the past 15 months)     The patient must have completed an in-person or virtual visit within the past 12 months or has a future visit scheduled within the next 90 days with the authorizing provider s specialty.  Urgent care and e-visits do not qualify as an office visit for this protocol.

## 2025-08-29 DIAGNOSIS — M25.59 PAIN IN OTHER JOINT: ICD-10-CM

## 2025-09-03 RX ORDER — FOLIC ACID 1 MG/1
1000 TABLET ORAL DAILY
Qty: 90 TABLET | Refills: 0 | Status: SHIPPED | OUTPATIENT
Start: 2025-09-03

## 2025-09-04 ENCOUNTER — TRANSCRIBE ORDERS (OUTPATIENT)
Dept: OTHER | Age: 30
End: 2025-09-04

## 2025-09-04 DIAGNOSIS — G40.909 NONINTRACTABLE EPILEPSY WITHOUT STATUS EPILEPTICUS, UNSPECIFIED EPILEPSY TYPE (H): Primary | ICD-10-CM

## 2025-09-04 DIAGNOSIS — R56.9 NONEPILEPTIC EPISODE (H): ICD-10-CM

## 2025-09-04 DIAGNOSIS — G40.109 TEMPORAL LOBE EPILEPSY (H): ICD-10-CM

## (undated) DEVICE — SU VICRYL 4-0 PS-1 18" UND J682G

## (undated) DEVICE — SYR 10ML LL W/O NDL 302995

## (undated) DEVICE — POSITIONER ARMBOARD FOAM 1PAIR LF FP-ARMB1

## (undated) DEVICE — PAD CHUX UNDERPAD 30X36" P3036C

## (undated) DEVICE — LINEN ORTHO PACK 5446

## (undated) DEVICE — ESU GROUND PAD UNIVERSAL W/O CORD

## (undated) DEVICE — SOL NACL 0.9% IRRIG 1000ML BOTTLE 2F7124

## (undated) DEVICE — SU PROLENE 4-0 P-3 18" 8699G

## (undated) DEVICE — DRSG XEROFORM 5X9" 8884431605

## (undated) DEVICE — Device

## (undated) DEVICE — SPONGE LAP 18X18" X8435

## (undated) DEVICE — ESU BLADE PEAK PLASMA 3.0S PS210-030S

## (undated) DEVICE — BLADE KNIFE SURG 10 371110

## (undated) DEVICE — SU PROLENE 5-0 P-3 18" 8698G

## (undated) DEVICE — DRSG KERLIX 4 1/2"X4YDS ROLL 6730

## (undated) DEVICE — SU PLAIN FAST ABSORB 5-0 PC-1 18" 1915G

## (undated) DEVICE — BLADE KNIFE SURG 15 371115

## (undated) DEVICE — SOL WATER IRRIG 1000ML BOTTLE 2F7114

## (undated) DEVICE — STRAP KNEE/BODY 31143004

## (undated) DEVICE — GOWN XLG DISP 9545

## (undated) DEVICE — DRSG ABDOMINAL 07 1/2X8" 7197D

## (undated) DEVICE — SU SILK 2-0 TIE 12X30" A305H

## (undated) DEVICE — LINEN TOWEL PACK X5 5464

## (undated) DEVICE — SU SILK 2-0 SH 30" K833H

## (undated) DEVICE — FILTER HEPA FLUID TRAP NEPTUNE 0703-040-001

## (undated) DEVICE — NDL 18GA 1.5" 305196

## (undated) DEVICE — DRAPE LAP TRANSVERSE 29421

## (undated) DEVICE — SYR BULB IRRIG DOVER 60 ML LATEX FREE 67000

## (undated) DEVICE — CATH TRAY FOLEY SURESTEP 16FR WDRAIN BAG STLK LATEX A300316A

## (undated) DEVICE — LABEL MEDICATION SYSTEM 3303-P

## (undated) DEVICE — ESU PENCIL W/SMOKE EVAC NEPTUNE STRYKER 0703-046-000

## (undated) DEVICE — SU VICRYL 3-0 FS-1 27" J442H

## (undated) DEVICE — BNDG ELASTIC 6" DBL LENGTH UNSTERILE 6611-16

## (undated) DEVICE — ATTENTION CASE CART PLEASE PICK

## (undated) DEVICE — BNDG ELASTIC 6"X5YDS UNSTERILE 6611-60

## (undated) DEVICE — STPL SKIN 35W ROTATING HEAD PRW35

## (undated) DEVICE — SU VICRYL 2-0 CT-1 27" UND J259H

## (undated) DEVICE — TUBING SUCTION MEDI-VAC 1/4"X20' N620A

## (undated) DEVICE — POSITIONER HEAD DONUT FOAM 9" LF FP-HEAD9

## (undated) DEVICE — EYE MARKING PAD 581057

## (undated) DEVICE — PREP CHLORAPREP 26ML TINTED HI-LITE ORANGE 930815

## (undated) DEVICE — DRSG ADAPTIC 3X8" 6113

## (undated) DEVICE — LIGHT HANDLE X2

## (undated) DEVICE — SUCTION MANIFOLD NEPTUNE 2 SYS 4 PORT 0702-020-000

## (undated) DEVICE — DRSG TEGADERM 2 3/8X2 3/4" 1624W

## (undated) DEVICE — SOL ADH LIQUID BENZOIN SWAB 0.6ML C1544

## (undated) DEVICE — DRAIN JACKSON PRATT RESERVOIR 100ML SU130-1305

## (undated) DEVICE — SU VICRYL 2-0 CT-1 27" J339H

## (undated) DEVICE — CONTAINER SPECIMEN 4OZ STERILE 17099

## (undated) DEVICE — GLOVE SURG PI ULTRA TOUCH M SZ 6-1/2 LF

## (undated) DEVICE — LIGHT HANDLE X1 31140133

## (undated) DEVICE — SU ETHILON 3-0 FS-1 18" 663G

## (undated) DEVICE — SU PLAIN 5-0 P-3 18" 686G

## (undated) DEVICE — DRSG COTTON BALL 6PK LCB62

## (undated) DEVICE — GLOVE PROTEXIS MICRO 6.5  2D73PM65

## (undated) DEVICE — DRAIN JACKSON PRATT CHANNEL 15FR ROUND HUBLESS SIL JP-2228

## (undated) RX ORDER — HYDROMORPHONE HYDROCHLORIDE 1 MG/ML
INJECTION, SOLUTION INTRAMUSCULAR; INTRAVENOUS; SUBCUTANEOUS
Status: DISPENSED
Start: 2023-08-18

## (undated) RX ORDER — OXYCODONE HYDROCHLORIDE 5 MG/1
TABLET ORAL
Status: DISPENSED
Start: 2022-09-26

## (undated) RX ORDER — HYDROMORPHONE HCL IN WATER/PF 6 MG/30 ML
PATIENT CONTROLLED ANALGESIA SYRINGE INTRAVENOUS
Status: DISPENSED
Start: 2022-09-26

## (undated) RX ORDER — PROPOFOL 10 MG/ML
INJECTION, EMULSION INTRAVENOUS
Status: DISPENSED
Start: 2023-08-18

## (undated) RX ORDER — FENTANYL CITRATE-0.9 % NACL/PF 10 MCG/ML
PLASTIC BAG, INJECTION (ML) INTRAVENOUS
Status: DISPENSED
Start: 2022-09-26

## (undated) RX ORDER — CEFAZOLIN SODIUM 1 G/3ML
INJECTION, POWDER, FOR SOLUTION INTRAMUSCULAR; INTRAVENOUS
Status: DISPENSED
Start: 2023-08-18

## (undated) RX ORDER — HYDROCODONE BITARTRATE AND ACETAMINOPHEN 5; 325 MG/1; MG/1
TABLET ORAL
Status: DISPENSED
Start: 2023-08-18

## (undated) RX ORDER — ONDANSETRON 2 MG/ML
INJECTION INTRAMUSCULAR; INTRAVENOUS
Status: DISPENSED
Start: 2023-08-18

## (undated) RX ORDER — FENTANYL CITRATE 50 UG/ML
INJECTION, SOLUTION INTRAMUSCULAR; INTRAVENOUS
Status: DISPENSED
Start: 2022-09-26

## (undated) RX ORDER — FENTANYL CITRATE 50 UG/ML
INJECTION, SOLUTION INTRAMUSCULAR; INTRAVENOUS
Status: DISPENSED
Start: 2023-08-18

## (undated) RX ORDER — ACETAMINOPHEN 325 MG/1
TABLET ORAL
Status: DISPENSED
Start: 2023-08-18

## (undated) RX ORDER — ACETAMINOPHEN 325 MG/1
TABLET ORAL
Status: DISPENSED
Start: 2022-09-26

## (undated) RX ORDER — FENTANYL CITRATE-0.9 % NACL/PF 10 MCG/ML
PLASTIC BAG, INJECTION (ML) INTRAVENOUS
Status: DISPENSED
Start: 2023-08-18

## (undated) RX ORDER — KETOROLAC TROMETHAMINE 30 MG/ML
INJECTION, SOLUTION INTRAMUSCULAR; INTRAVENOUS
Status: DISPENSED
Start: 2023-08-18

## (undated) RX ORDER — CEFAZOLIN SODIUM/WATER 2 G/20 ML
SYRINGE (ML) INTRAVENOUS
Status: DISPENSED
Start: 2023-08-18

## (undated) RX ORDER — EPHEDRINE SULFATE 50 MG/ML
INJECTION, SOLUTION INTRAMUSCULAR; INTRAVENOUS; SUBCUTANEOUS
Status: DISPENSED
Start: 2022-09-26

## (undated) RX ORDER — CIPROFLOXACIN 500 MG/1
TABLET, FILM COATED ORAL
Status: DISPENSED
Start: 2022-03-30

## (undated) RX ORDER — HYDROMORPHONE HYDROCHLORIDE 1 MG/ML
INJECTION, SOLUTION INTRAMUSCULAR; INTRAVENOUS; SUBCUTANEOUS
Status: DISPENSED
Start: 2022-09-26

## (undated) RX ORDER — FENTANYL CITRATE 0.05 MG/ML
INJECTION, SOLUTION INTRAMUSCULAR; INTRAVENOUS
Status: DISPENSED
Start: 2023-08-18

## (undated) RX ORDER — DEXAMETHASONE SODIUM PHOSPHATE 4 MG/ML
INJECTION, SOLUTION INTRA-ARTICULAR; INTRALESIONAL; INTRAMUSCULAR; INTRAVENOUS; SOFT TISSUE
Status: DISPENSED
Start: 2023-08-18

## (undated) RX ORDER — BUPIVACAINE HYDROCHLORIDE 2.5 MG/ML
INJECTION, SOLUTION EPIDURAL; INFILTRATION; INTRACAUDAL
Status: DISPENSED
Start: 2022-09-26